# Patient Record
Sex: MALE | Race: WHITE | URBAN - METROPOLITAN AREA
[De-identification: names, ages, dates, MRNs, and addresses within clinical notes are randomized per-mention and may not be internally consistent; named-entity substitution may affect disease eponyms.]

---

## 2019-07-28 ENCOUNTER — EMERGENCY (EMERGENCY)
Facility: HOSPITAL | Age: 64
LOS: 0 days | Discharge: HOME | End: 2019-07-29
Attending: EMERGENCY MEDICINE | Admitting: EMERGENCY MEDICINE
Payer: SUBSIDIZED

## 2019-07-28 VITALS
DIASTOLIC BLOOD PRESSURE: 61 MMHG | RESPIRATION RATE: 18 BRPM | SYSTOLIC BLOOD PRESSURE: 95 MMHG | HEART RATE: 63 BPM | OXYGEN SATURATION: 98 %

## 2019-07-28 VITALS
RESPIRATION RATE: 18 BRPM | OXYGEN SATURATION: 98 % | HEART RATE: 78 BPM | DIASTOLIC BLOOD PRESSURE: 50 MMHG | TEMPERATURE: 98 F | SYSTOLIC BLOOD PRESSURE: 72 MMHG | WEIGHT: 134.92 LBS

## 2019-07-28 DIAGNOSIS — R56.9 UNSPECIFIED CONVULSIONS: ICD-10-CM

## 2019-07-28 DIAGNOSIS — Z79.899 OTHER LONG TERM (CURRENT) DRUG THERAPY: ICD-10-CM

## 2019-07-28 DIAGNOSIS — R41.0 DISORIENTATION, UNSPECIFIED: ICD-10-CM

## 2019-07-28 DIAGNOSIS — G40.909 EPILEPSY, UNSPECIFIED, NOT INTRACTABLE, WITHOUT STATUS EPILEPTICUS: ICD-10-CM

## 2019-07-28 LAB
ALBUMIN SERPL ELPH-MCNC: 3.7 G/DL — SIGNIFICANT CHANGE UP (ref 3.5–5.2)
ALP SERPL-CCNC: 57 U/L — SIGNIFICANT CHANGE UP (ref 30–115)
ALT FLD-CCNC: 12 U/L — SIGNIFICANT CHANGE UP (ref 0–41)
ANION GAP SERPL CALC-SCNC: 11 MMOL/L — SIGNIFICANT CHANGE UP (ref 7–14)
APPEARANCE UR: CLEAR — SIGNIFICANT CHANGE UP
APTT BLD: 31.5 SEC — SIGNIFICANT CHANGE UP (ref 27–39.2)
AST SERPL-CCNC: 15 U/L — SIGNIFICANT CHANGE UP (ref 0–41)
BASOPHILS # BLD AUTO: 0.04 K/UL — SIGNIFICANT CHANGE UP (ref 0–0.2)
BASOPHILS NFR BLD AUTO: 0.6 % — SIGNIFICANT CHANGE UP (ref 0–1)
BILIRUB SERPL-MCNC: <0.2 MG/DL — SIGNIFICANT CHANGE UP (ref 0.2–1.2)
BILIRUB UR-MCNC: NEGATIVE — SIGNIFICANT CHANGE UP
BUN SERPL-MCNC: 24 MG/DL — HIGH (ref 10–20)
CALCIUM SERPL-MCNC: 8.2 MG/DL — LOW (ref 8.5–10.1)
CHLORIDE SERPL-SCNC: 103 MMOL/L — SIGNIFICANT CHANGE UP (ref 98–110)
CO2 SERPL-SCNC: 27 MMOL/L — SIGNIFICANT CHANGE UP (ref 17–32)
COLOR SPEC: YELLOW — SIGNIFICANT CHANGE UP
CREAT SERPL-MCNC: 1 MG/DL — SIGNIFICANT CHANGE UP (ref 0.7–1.5)
DIFF PNL FLD: SIGNIFICANT CHANGE UP
EOSINOPHIL # BLD AUTO: 0.32 K/UL — SIGNIFICANT CHANGE UP (ref 0–0.7)
EOSINOPHIL NFR BLD AUTO: 5.2 % — SIGNIFICANT CHANGE UP (ref 0–8)
GAS PNL BLDV: SIGNIFICANT CHANGE UP
GLUCOSE SERPL-MCNC: 98 MG/DL — SIGNIFICANT CHANGE UP (ref 70–99)
GLUCOSE UR QL: NEGATIVE — SIGNIFICANT CHANGE UP
HCT VFR BLD CALC: 32.9 % — LOW (ref 42–52)
HGB BLD-MCNC: 11 G/DL — LOW (ref 14–18)
IMM GRANULOCYTES NFR BLD AUTO: 0.5 % — HIGH (ref 0.1–0.3)
INR BLD: 1.09 RATIO — SIGNIFICANT CHANGE UP (ref 0.65–1.3)
KETONES UR-MCNC: SIGNIFICANT CHANGE UP
LACTATE BLDV-MCNC: 2.4 MMOL/L — HIGH (ref 0.5–1.6)
LEUKOCYTE ESTERASE UR-ACNC: NEGATIVE — SIGNIFICANT CHANGE UP
LYMPHOCYTES # BLD AUTO: 2.72 K/UL — SIGNIFICANT CHANGE UP (ref 1.2–3.4)
LYMPHOCYTES # BLD AUTO: 44 % — SIGNIFICANT CHANGE UP (ref 20.5–51.1)
MCHC RBC-ENTMCNC: 30.7 PG — SIGNIFICANT CHANGE UP (ref 27–31)
MCHC RBC-ENTMCNC: 33.4 G/DL — SIGNIFICANT CHANGE UP (ref 32–37)
MCV RBC AUTO: 91.9 FL — SIGNIFICANT CHANGE UP (ref 80–94)
MONOCYTES # BLD AUTO: 0.46 K/UL — SIGNIFICANT CHANGE UP (ref 0.1–0.6)
MONOCYTES NFR BLD AUTO: 7.4 % — SIGNIFICANT CHANGE UP (ref 1.7–9.3)
NEUTROPHILS # BLD AUTO: 2.61 K/UL — SIGNIFICANT CHANGE UP (ref 1.4–6.5)
NEUTROPHILS NFR BLD AUTO: 42.3 % — SIGNIFICANT CHANGE UP (ref 42.2–75.2)
NITRITE UR-MCNC: NEGATIVE — SIGNIFICANT CHANGE UP
NRBC # BLD: 0 /100 WBCS — SIGNIFICANT CHANGE UP (ref 0–0)
PH UR: 6 — SIGNIFICANT CHANGE UP (ref 5–8)
PHENOBARB SERPL-MCNC: 11.7 UG/ML — LOW (ref 15–40)
PHENYTOIN FREE SERPL-MCNC: 6.5 UG/ML — LOW (ref 10–20)
PLATELET # BLD AUTO: 142 K/UL — SIGNIFICANT CHANGE UP (ref 130–400)
POTASSIUM SERPL-MCNC: 3.8 MMOL/L — SIGNIFICANT CHANGE UP (ref 3.5–5)
POTASSIUM SERPL-SCNC: 3.8 MMOL/L — SIGNIFICANT CHANGE UP (ref 3.5–5)
PROT SERPL-MCNC: 6 G/DL — SIGNIFICANT CHANGE UP (ref 6–8)
PROT UR-MCNC: SIGNIFICANT CHANGE UP
PROTHROM AB SERPL-ACNC: 12.5 SEC — SIGNIFICANT CHANGE UP (ref 9.95–12.87)
RBC # BLD: 3.58 M/UL — LOW (ref 4.7–6.1)
RBC # FLD: 13.8 % — SIGNIFICANT CHANGE UP (ref 11.5–14.5)
SODIUM SERPL-SCNC: 141 MMOL/L — SIGNIFICANT CHANGE UP (ref 135–146)
SP GR SPEC: 1.03 — HIGH (ref 1.01–1.02)
TROPONIN T SERPL-MCNC: <0.01 NG/ML — SIGNIFICANT CHANGE UP
UROBILINOGEN FLD QL: SIGNIFICANT CHANGE UP
VALPROATE SERPL-MCNC: 28 UG/ML — LOW (ref 50–100)
WBC # BLD: 6.18 K/UL — SIGNIFICANT CHANGE UP (ref 4.8–10.8)
WBC # FLD AUTO: 6.18 K/UL — SIGNIFICANT CHANGE UP (ref 4.8–10.8)

## 2019-07-28 PROCEDURE — 93010 ELECTROCARDIOGRAM REPORT: CPT

## 2019-07-28 PROCEDURE — 99285 EMERGENCY DEPT VISIT HI MDM: CPT

## 2019-07-28 PROCEDURE — 71045 X-RAY EXAM CHEST 1 VIEW: CPT | Mod: 26

## 2019-07-28 RX ORDER — SODIUM CHLORIDE 9 MG/ML
1000 INJECTION, SOLUTION INTRAVENOUS ONCE
Refills: 0 | Status: COMPLETED | OUTPATIENT
Start: 2019-07-28 | End: 2019-07-28

## 2019-07-28 RX ADMIN — SODIUM CHLORIDE 2000 MILLILITER(S): 9 INJECTION, SOLUTION INTRAVENOUS at 21:45

## 2019-07-28 NOTE — ED ADULT TRIAGE NOTE - CHIEF COMPLAINT QUOTE
pt BIBA s/p witnessed seizure by family for 20 mins. pt has hx of seizures. pt is aaox4, NAD noted. GCS 15, pt does not seem post ictal.     in triage pt is hypotensive 72/50, bp repeated twice, pt still hypotensive 74/52. pt sent to Critical care/ trauma area.

## 2019-07-28 NOTE — ED PROVIDER NOTE - PROGRESS NOTE DETAILS
Long discussion with patient and nephew at bedside, plan for d/c home and f/u with neurologist. PT informed that medication levels are low, pt given Rx to increase Depakote to 750 mg TID. Patient to be discharged from ED. Any available test results were discussed with patient and/or family. Verbal instructions given, including instructions to return to ED immediately for any new, worsening, or concerning symptoms. Patient endorsed understanding. Written discharge instructions additionally given, including follow-up plan.

## 2019-07-28 NOTE — ED PROVIDER NOTE - CARE PROVIDER_API CALL
Marvin Casas)  Neurology  [address] [place] [address], [address] [place] [address]  Phone: [phone]  Fax: [phone]  Follow Up Time:

## 2019-07-28 NOTE — ED PROVIDER NOTE - OBJECTIVE STATEMENT
63 y/o male with PMH of seizure disorder who presents to ED for seizure. Pt was in home with sister who had COPD exacerbation and was brought to ED via BLS, when pt witnessed this the stressful situation caused the pt to have  witness tonic clonic seizure lasting about 5 minutes. After pt was confused, no tongue bite or incontinence. While in ED pt c/o feeling tired. No fever, chills, cough, congestion, abdominal pain, nausea vomiting or diarrhea. Pt is followed by n euro in [place] and has seizures about once every 2-3 months. 63 y/o male with PMH of seizure disorder who presents to ED for seizure. Pt was in home with sister who had COPD exacerbation and was brought to ED via BLS, when pt witnessed this the stressful situation caused the pt to have  witness tonic clonic seizure lasting about 5 minutes. After pt was confused, no tongue bite or incontinence. While in ED pt c/o feeling tired. No fever, chills, cough, congestion, abdominal pain, nausea vomiting or diarrhea. Pt is followed by neuro in [place] and has seizures about once every 2-3 months.

## 2019-07-28 NOTE — ED PROVIDER NOTE - CLINICAL SUMMARY MEDICAL DECISION MAKING FREE TEXT BOX
Patient reports compliance with medications.  He is trying to get a Neurologist on [place]. On exam, VS reviewed.  Patient back to baseline.  Neuro exam is normal. No signs of serious trauma.  IV placed, labs sent.  Patient has subtherapeutic levels of his anti-epileptic drugs.  All results discussed with patient and his nephew.  Patient opting for discharge with close outpatient follow up after shared decision making utilized. Plan made to increase Depakote dosage for now and patient given referral to Neurology on [place].    Full DC instructions discussed and patient knows when to seek immediate medical attention.  Patient has proper follow up.  All results discussed and patient aware they may require further work up.  Proper follow up ensured. Limitations of ED work up discussed.  Medications administered and prescribed/OTC home meds discussed.  All questions and concerns from patient or family addressed. Understanding of instructions verbalized.

## 2019-07-28 NOTE — ED PROVIDER NOTE - NS ED ROS FT
Review of Systems:  •	CONSTITUTIONAL - No fever, No diaphoresis, No weight change  •	SKIN - No rash  •	HEMATOLOGIC - No abnormal bleeding or bruising  •	EYES - No eye pain, No blurred vision  •	ENT - No change in hearing, No sore throat, No neck pain, No rhinorrhea, No ear pain  •	RESPIRATORY - No shortness of breath, No cough  •	CARDIAC -No chest pain, No palpitations  •	GI - No abdominal pain, No nausea, No vomiting, No diarrhea, No constipation, No bright red blood per rectum or melena. No flank pain  •                 - No dysuria, frequency, hematuria.   •	ENDO - No polydypsia, No polyuria, No heat/cold intolerance  •	MUSCULOSKELETAL - No joint paint, No swelling, No back pain  •	NEUROLOGIC - + seizure. No numbness, No focal weakness, No headache, No dizziness  All other systems negative, unless specified in HPI

## 2019-07-28 NOTE — ED PROVIDER NOTE - ATTENDING CONTRIBUTION TO CARE
I personally evaluated patient. I agree with the findings and plan with all documentation on chart except as documented  in my note.    65 y/o male with PMH of seizure disorder who presents to ED for seizure. Pt was in home with sister who had COPD exacerbation and was brought to ED via BLS, when pt witnessed this the stressful situation caused the pt to have  witness tonic clonic seizure lasting about 5 minutes. After pt was confused, no tongue bite or incontinence. While in ED pt c/o feeling tired. No fever, chills, cough, congestion, abdominal pain, nausea vomiting or diarrhea. Pt is followed by neuro in [place] and has seizures about once every 2-3 months.    Patient reports compliance with medications.  He is trying to get a Neurologist on [place]. On exam, VS reviewed.  Patient back to baseline.  Neuro exam is normal. No signs of serious trauma.  IV placed, labs sent.  Patient has subtherapeutic levels of his anti-epileptic drugs.  All results discussed with patient and his nephew.  Patient opting for discharge with close outpatient follow up after shared decision making utilized. Plan made to increase Depakote dosage for now and patient given referral to Neurology on [place].    Full DC instructions discussed and patient knows when to seek immediate medical attention.  Patient has proper follow up.  All results discussed and patient aware they may require further work up.  Proper follow up ensured. Limitations of ED work up discussed.  Medications administered and prescribed/OTC home meds discussed.  All questions and concerns from patient or family addressed. Understanding of instructions verbalized.

## 2019-07-29 LAB
BASE EXCESS BLDV CALC-SCNC: 3.3 MMOL/L — HIGH (ref -2–2)
CA-I SERPL-SCNC: 1.1 MMOL/L — LOW (ref 1.12–1.3)
GAS PNL BLDV: 139 MMOL/L — SIGNIFICANT CHANGE UP (ref 136–145)
HCO3 BLDV-SCNC: 30 MMOL/L — HIGH (ref 22–29)
HCT VFR BLDA CALC: 32.2 % — LOW (ref 34–44)
HGB BLD CALC-MCNC: 10.5 G/DL — LOW (ref 14–18)
LACTATE BLDV-MCNC: 2.8 MMOL/L — HIGH (ref 0.5–1.6)
PCO2 BLDV: 55 MMHG — HIGH (ref 41–51)
PH BLDV: 7.34 — SIGNIFICANT CHANGE UP (ref 7.26–7.43)
PO2 BLDV: 20 MMHG — SIGNIFICANT CHANGE UP (ref 20–40)
POTASSIUM BLDV-SCNC: 3.5 MMOL/L — SIGNIFICANT CHANGE UP (ref 3.3–5.6)
SAO2 % BLDV: 35 % — SIGNIFICANT CHANGE UP

## 2019-07-29 RX ORDER — DIVALPROEX SODIUM 500 MG/1
1 TABLET, DELAYED RELEASE ORAL
Qty: 30 | Refills: 0
Start: 2019-07-29 | End: 2019-08-27

## 2019-07-29 NOTE — ED ADULT NURSE NOTE - NSIMPLEMENTINTERV_GEN_ALL_ED
Implemented All Fall with Harm Risk Interventions:  [place] to call system. Call bell, personal items and telephone within reach. Instruct patient to call for assistance. Room bathroom lighting operational. Non-slip footwear when patient is off stretcher. Physically safe environment: no spills, clutter or unnecessary equipment. Stretcher in lowest position, wheels locked, appropriate side rails in place. Provide visual cue, wrist band, yellow gown, etc. Monitor gait and stability. Monitor for mental status changes and reorient to person, place, and time. Review medications for side effects contributing to fall risk. Reinforce activity limits and safety measures with patient and family. Provide visual clues: red socks.

## 2019-07-29 NOTE — ED ADULT NURSE NOTE - CHPI ED NUR SYMPTOMS NEG
no loss of consciousness/no numbness/no change in level of consciousness/no nausea/no fever/no weakness/no dizziness/no blurred vision/no vomiting/no confusion

## 2019-07-30 LAB
CULTURE RESULTS: SIGNIFICANT CHANGE UP
SPECIMEN SOURCE: SIGNIFICANT CHANGE UP

## 2019-07-30 RX ORDER — DIVALPROEX SODIUM 500 MG/1
1 TABLET, DELAYED RELEASE ORAL
Qty: 30 | Refills: 0
Start: 2019-07-30 | End: 2019-08-28

## 2019-08-21 ENCOUNTER — EMERGENCY (EMERGENCY)
Facility: HOSPITAL | Age: 64
LOS: 0 days | Discharge: HOME | End: 2019-08-21
Attending: EMERGENCY MEDICINE | Admitting: EMERGENCY MEDICINE
Payer: SUBSIDIZED

## 2019-08-21 VITALS
RESPIRATION RATE: 16 BRPM | TEMPERATURE: 98 F | OXYGEN SATURATION: 98 % | DIASTOLIC BLOOD PRESSURE: 63 MMHG | SYSTOLIC BLOOD PRESSURE: 104 MMHG | HEART RATE: 59 BPM

## 2019-08-21 VITALS
SYSTOLIC BLOOD PRESSURE: 90 MMHG | DIASTOLIC BLOOD PRESSURE: 53 MMHG | RESPIRATION RATE: 16 BRPM | OXYGEN SATURATION: 98 % | HEART RATE: 68 BPM | TEMPERATURE: 96 F

## 2019-08-21 DIAGNOSIS — G40.909 EPILEPSY, UNSPECIFIED, NOT INTRACTABLE, WITHOUT STATUS EPILEPTICUS: ICD-10-CM

## 2019-08-21 DIAGNOSIS — R56.9 UNSPECIFIED CONVULSIONS: ICD-10-CM

## 2019-08-21 LAB
ALBUMIN SERPL ELPH-MCNC: 3.7 G/DL — SIGNIFICANT CHANGE UP (ref 3.5–5.2)
ALP SERPL-CCNC: 49 U/L — SIGNIFICANT CHANGE UP (ref 30–115)
ALT FLD-CCNC: 22 U/L — SIGNIFICANT CHANGE UP (ref 0–41)
ANION GAP SERPL CALC-SCNC: 10 MMOL/L — SIGNIFICANT CHANGE UP (ref 7–14)
APPEARANCE UR: CLEAR — SIGNIFICANT CHANGE UP
AST SERPL-CCNC: 19 U/L — SIGNIFICANT CHANGE UP (ref 0–41)
BASOPHILS # BLD AUTO: 0.06 K/UL — SIGNIFICANT CHANGE UP (ref 0–0.2)
BASOPHILS NFR BLD AUTO: 0.9 % — SIGNIFICANT CHANGE UP (ref 0–1)
BILIRUB SERPL-MCNC: 0.2 MG/DL — SIGNIFICANT CHANGE UP (ref 0.2–1.2)
BILIRUB UR-MCNC: NEGATIVE — SIGNIFICANT CHANGE UP
BUN SERPL-MCNC: 26 MG/DL — HIGH (ref 10–20)
CALCIUM SERPL-MCNC: 9 MG/DL — SIGNIFICANT CHANGE UP (ref 8.5–10.1)
CHLORIDE SERPL-SCNC: 103 MMOL/L — SIGNIFICANT CHANGE UP (ref 98–110)
CO2 SERPL-SCNC: 26 MMOL/L — SIGNIFICANT CHANGE UP (ref 17–32)
COLOR SPEC: SIGNIFICANT CHANGE UP
CREAT SERPL-MCNC: 0.9 MG/DL — SIGNIFICANT CHANGE UP (ref 0.7–1.5)
DIFF PNL FLD: SIGNIFICANT CHANGE UP
EOSINOPHIL # BLD AUTO: 0.34 K/UL — SIGNIFICANT CHANGE UP (ref 0–0.7)
EOSINOPHIL NFR BLD AUTO: 5.3 % — SIGNIFICANT CHANGE UP (ref 0–8)
GLUCOSE SERPL-MCNC: 86 MG/DL — SIGNIFICANT CHANGE UP (ref 70–99)
GLUCOSE UR QL: NEGATIVE — SIGNIFICANT CHANGE UP
HCT VFR BLD CALC: 38.9 % — LOW (ref 42–52)
HGB BLD-MCNC: 12.8 G/DL — LOW (ref 14–18)
IMM GRANULOCYTES NFR BLD AUTO: 0.9 % — HIGH (ref 0.1–0.3)
KETONES UR-MCNC: NEGATIVE — SIGNIFICANT CHANGE UP
LEUKOCYTE ESTERASE UR-ACNC: NEGATIVE — SIGNIFICANT CHANGE UP
LIDOCAIN IGE QN: 16 U/L — SIGNIFICANT CHANGE UP (ref 7–60)
LYMPHOCYTES # BLD AUTO: 2.15 K/UL — SIGNIFICANT CHANGE UP (ref 1.2–3.4)
LYMPHOCYTES # BLD AUTO: 33.8 % — SIGNIFICANT CHANGE UP (ref 20.5–51.1)
MCHC RBC-ENTMCNC: 30.3 PG — SIGNIFICANT CHANGE UP (ref 27–31)
MCHC RBC-ENTMCNC: 32.9 G/DL — SIGNIFICANT CHANGE UP (ref 32–37)
MCV RBC AUTO: 92 FL — SIGNIFICANT CHANGE UP (ref 80–94)
MONOCYTES # BLD AUTO: 0.58 K/UL — SIGNIFICANT CHANGE UP (ref 0.1–0.6)
MONOCYTES NFR BLD AUTO: 9.1 % — SIGNIFICANT CHANGE UP (ref 1.7–9.3)
NEUTROPHILS # BLD AUTO: 3.18 K/UL — SIGNIFICANT CHANGE UP (ref 1.4–6.5)
NEUTROPHILS NFR BLD AUTO: 50 % — SIGNIFICANT CHANGE UP (ref 42.2–75.2)
NITRITE UR-MCNC: NEGATIVE — SIGNIFICANT CHANGE UP
NRBC # BLD: 0 /100 WBCS — SIGNIFICANT CHANGE UP (ref 0–0)
PH UR: 6 — SIGNIFICANT CHANGE UP (ref 5–8)
PHENOBARB SERPL-MCNC: 25.9 UG/ML — SIGNIFICANT CHANGE UP (ref 15–40)
PHENYTOIN FREE SERPL-MCNC: 9.7 UG/ML — LOW (ref 10–20)
PLATELET # BLD AUTO: 163 K/UL — SIGNIFICANT CHANGE UP (ref 130–400)
POTASSIUM SERPL-MCNC: 4.5 MMOL/L — SIGNIFICANT CHANGE UP (ref 3.5–5)
POTASSIUM SERPL-SCNC: 4.5 MMOL/L — SIGNIFICANT CHANGE UP (ref 3.5–5)
PROT SERPL-MCNC: 6.3 G/DL — SIGNIFICANT CHANGE UP (ref 6–8)
PROT UR-MCNC: NEGATIVE — SIGNIFICANT CHANGE UP
RBC # BLD: 4.23 M/UL — LOW (ref 4.7–6.1)
RBC # FLD: 14.9 % — HIGH (ref 11.5–14.5)
SODIUM SERPL-SCNC: 139 MMOL/L — SIGNIFICANT CHANGE UP (ref 135–146)
SP GR SPEC: 1.02 — SIGNIFICANT CHANGE UP (ref 1.01–1.02)
TROPONIN T SERPL-MCNC: <0.01 NG/ML — SIGNIFICANT CHANGE UP
UROBILINOGEN FLD QL: SIGNIFICANT CHANGE UP
VALPROATE SERPL-MCNC: 19 UG/ML — LOW (ref 50–100)
WBC # BLD: 6.37 K/UL — SIGNIFICANT CHANGE UP (ref 4.8–10.8)
WBC # FLD AUTO: 6.37 K/UL — SIGNIFICANT CHANGE UP (ref 4.8–10.8)

## 2019-08-21 PROCEDURE — 99285 EMERGENCY DEPT VISIT HI MDM: CPT

## 2019-08-21 PROCEDURE — 71045 X-RAY EXAM CHEST 1 VIEW: CPT | Mod: 26

## 2019-08-21 PROCEDURE — 93010 ELECTROCARDIOGRAM REPORT: CPT

## 2019-08-21 PROCEDURE — 70450 CT HEAD/BRAIN W/O DYE: CPT | Mod: 26

## 2019-08-21 RX ORDER — PHENOBARBITAL 60 MG
1 TABLET ORAL
Qty: 40 | Refills: 0
Start: 2019-08-21 | End: 2019-08-30

## 2019-08-21 RX ORDER — DIVALPROEX SODIUM 500 MG/1
250 TABLET, DELAYED RELEASE ORAL ONCE
Refills: 0 | Status: COMPLETED | OUTPATIENT
Start: 2019-08-21 | End: 2019-08-21

## 2019-08-21 RX ORDER — VALPROIC ACID (AS SODIUM SALT) 250 MG/5ML
250 SOLUTION, ORAL ORAL ONCE
Refills: 0 | Status: DISCONTINUED | OUTPATIENT
Start: 2019-08-21 | End: 2019-08-21

## 2019-08-21 RX ORDER — SODIUM CHLORIDE 9 MG/ML
1000 INJECTION INTRAMUSCULAR; INTRAVENOUS; SUBCUTANEOUS ONCE
Refills: 0 | Status: COMPLETED | OUTPATIENT
Start: 2019-08-21 | End: 2019-08-21

## 2019-08-21 RX ADMIN — DIVALPROEX SODIUM 250 MILLIGRAM(S): 500 TABLET, DELAYED RELEASE ORAL at 16:08

## 2019-08-21 RX ADMIN — Medication 200 MILLIGRAM(S): at 14:49

## 2019-08-21 RX ADMIN — SODIUM CHLORIDE 2000 MILLILITER(S): 9 INJECTION INTRAMUSCULAR; INTRAVENOUS; SUBCUTANEOUS at 13:01

## 2019-08-21 NOTE — ED PROVIDER NOTE - PHYSICAL EXAMINATION
Gen: NAD, AOx3  Head: NCAT  HEENT: PERRL, oral mucosa moist, normal conjunctiva, oropharynx clear without exudate or erythema, no abrasion/bites to tongue   Lung: CTAB, no respiratory distress, no wheezing, rales, rhonchi  CV: normal s1/s2, rrr, Normal perfusion, pulses 2+ throughout  Abd: soft, NTND, no CVA tenderness  Genitourinary: no pelvic tenderness  MSK: No edema, no visible deformities, full range of motion in all 4 extremities  Neuro: CN II-XII grossly intact, No focal neurologic deficits, no nystagmus/pronator drift, strength 5/5 throughout, coordination and sensation intact  Skin: No rash   Psych: normal affect

## 2019-08-21 NOTE — ED PROVIDER NOTE - NS ED ROS FT
Constitutional: (-) fever  Eyes/ENT: (-) blurry vision, (-) epistaxis, (-) visual changes   Cardiovascular: (-) chest pain, (-) syncope  Respiratory: (-) cough, (-) shortness of breath  Gastrointestinal: (-) vomiting, (-) diarrhea  Genitourinary: (-) dysuria, (-) hesitancy, (-) frequency   Musculoskeletal: (-) neck pain, (-) back pain, (-) joint pain  Integumentary: (-) rash, (-) edema  Neurological: (-) headache, (-) altered mental status, seizure   Allergic/Immunologic: (-) pruritus

## 2019-08-21 NOTE — ED ADULT NURSE REASSESSMENT NOTE - NS ED NURSE REASSESS COMMENT FT1
Pt BIBA, pt had witnessed seizure x 45 minutes by family. Pt then had mild seizure in triage. Upon receiving pt report, pt disoriented. As per family pt did not take his medications today.

## 2019-08-21 NOTE — ED PROVIDER NOTE - CLINICAL SUMMARY MEDICAL DECISION MAKING FREE TEXT BOX
Patient presented s/p seizure, known hx seizures in the past. Otherwise afebrile, HD stable, at baseline mental status, fully neurovascular intact. Obtained labs, CT head, CXR, all grossly unremarkable except for slightly low valproic acid level. Patient was given doses of anti-seizure meds in ED. Patient monitored in ED without recurrence of symptoms. Tolerates PO. Will discharge home with outpatient neuro follow up. Patient agreeable with plan. Agrees to return to ED for any new or worsening symptoms.

## 2019-08-21 NOTE — ED PROVIDER NOTE - ATTENDING CONTRIBUTION TO CARE
raj historian hisotry of seizures on depakote phenytoin and phenobarb who presents after  siezure per patient, reports sister was at home she called ems, patient states he was on his couch and had seizure and then remembers waking up with ems. ems note reports shaking activity and was given versed. here patient is awake and oriented but can not remember full details to questions about today. he moves oxana xt he has no tongue biting, no signs of trauma, neck is not stiff, no rash, no head injury, imp: seizure vs pseudo seizure, check ct head labs.

## 2019-08-21 NOTE — ED PROVIDER NOTE - NSFOLLOWUPINSTRUCTIONS_ED_ALL_ED_FT
Please follow up with your primary care physician within 24-72 hours and return immediately if symptoms worsen.     Seizure, Adult  When you have a seizure:  Parts of your body may move.  You may have a change in how aware or awake (conscious) you are.  You may shake (convulse).  Seizures usually last from 30 seconds to 2 minutes. Usually, they are not harmful unless they last a long time.    What are the signs or symptoms?  Common symptoms of this condition include:  Shaking (convulsions).  Stiffness in the body.  Passing out (losing consciousness).  Uncontrolled movements in the:  Arms or legs.  Eyes.  Head.  Mouth.  Some people have symptoms right before a seizure happens. These symptoms may include:  Fear.  Worry (anxiety).  Feeling like you are going to throw up (nausea).  Feeling like the room is spinning (vertigo).  Feeling like you saw or heard something before (déjà vu).  Odd tastes or smells.  Changes in vision, such as seeing flashing lights or spots.  Follow these instructions at home:  Medicines     Image   Take over-the-counter and prescription medicines only as told by your doctor.  Do not eat or drink anything that may keep your medicine from working, such as alcohol.  Activity     Do not do any activities that would be dangerous if you had another seizure, like driving or swimming. Wait until your doctor says it is safe for you to do them.  If you live in the [place] ask your local DMV (department of Appetise) when you can drive.  Get plenty of rest.  Teaching others     Image   Teach friends and family what to do when you have a seizure. They should:  Lay you on the ground.  Protect your head and body.  Loosen any tight clothing around your neck.  Turn you on your side.  Not hold you down.  Not put anything into your mouth.  Know whether or not you need emergency care.  Stay with you until you are better.  General instructions     Contact your doctor each time you have a seizure.  Avoid anything that gives you seizures.  Keep a seizure diary. Write down:  What you think caused each seizure.  What you remember about each seizure.  Keep all follow-up visits as told by your doctor. This is important.  Contact a doctor if:  You have another seizure.  You have seizures more often.  There is any change in what happens during your seizures.  You keep having seizures with treatment.  You have symptoms of being sick or having an infection.  Get help right away if:  You have a seizure:  That lasts longer than 5 minutes.  That is different than seizures you had before.  That makes it harder to breathe.  After you hurt your head.  After a seizure, you cannot speak or use a part of your body.  After a seizure, you are confused or have a bad headache.  You have two or more seizures in a row.  You are having seizures more often.  You do not wake up right after a seizure.  You get hurt during a seizure.  In an emergency:     These symptoms may be an emergency. Do not wait to see if the symptoms will go away. Get medical help right away. Call your local emergency services (911 in the [place] Do not drive yourself to the hospital.   Summary  Seizures usually last from 30 seconds to 2 minutes. Usually, they are not harmful unless they last a long time.  Do not eat or drink anything that may keep your medicine from working, such as alcohol.  Teach friends and family what to do when you have a seizure.  Contact your doctor each time you have a seizure.  This information is not intended to replace advice given to you by your health care provider. Make sure you discuss any questions you have with your health care provider

## 2019-08-21 NOTE — ED ADULT TRIAGE NOTE - CHIEF COMPLAINT QUOTE
As per EMS, pt was "shaking" upon arrival and was given 2 doses of Versed 5mg IV. Pt with a history of seizures on [place]."

## 2019-08-21 NOTE — ED ADULT NURSE NOTE - OBJECTIVE STATEMENT
pt BIBA , pt had seizure at home x 45 minutes. Upon receiving report, pt disoriented, and no seizure activity.

## 2019-08-21 NOTE — ED PROVIDER NOTE - OBJECTIVE STATEMENT
65 yo male with male with a pmh of seizures presents after a seizure. pt states he was sitting on his couch when this occurred and he did experience urinary incontinence. this seizure was unwitnessed so it is unknown of the length but he did wake up on the couch and the was no head trauma. pt denies any recent illness/infection and is states to be compliant with medication he denies any other symptoms including fevers, chill, headache, recent illness/travel, cough, abdominal pain, chest pain, or SOB

## 2019-08-21 NOTE — ED ADULT NURSE REASSESSMENT NOTE - NS ED NURSE REASSESS COMMENT FT1
Pt refusing bed alarm and red socks at this time despite education on safety and importance of bed alarm. Pt given fall risk bracelet and call bell, instructed to use call bell prior to getting up from bed, pt endorses he can walk on his own, will continue to monitor. Pt refusing bed alarm and red socks at this time despite education on safety and importance of bed alarm. Pt given fall risk bracelet and call bell, instructed to use call bell prior to getting up from bed, pt endorses he can walk on his own, will continue to monitor. Pt A/Ox3 at this time, pt ambulating with steady gait at this time.

## 2019-08-21 NOTE — ED ADULT NURSE NOTE - NSIMPLEMENTINTERV_GEN_ALL_ED
Implemented All Fall with Harm Risk Interventions:  [place] to call system. Call bell, personal items and telephone within reach. Instruct patient to call for assistance. Room bathroom lighting operational. Non-slip footwear when patient is off stretcher. Physically safe environment: no spills, clutter or unnecessary equipment. Stretcher in lowest position, wheels locked, appropriate side rails in place. Provide visual cue, wrist band, yellow gown, etc. Monitor gait and stability. Monitor for mental status changes and reorient to person, place, and time. Review medications for side effects contributing to fall risk. Reinforce activity limits and safety measures with patient and family. Provide visual clues: red socks.

## 2019-08-21 NOTE — ED PROVIDER NOTE - NSFOLLOWUPCLINICS_GEN_ALL_ED_FT
[facility]  Neurology  [address] [place] [address], [facility] [address]  [place] [address]  Phone: [phone]  Fax:   Follow Up Time: 1-3 Days

## 2019-08-22 PROBLEM — Z00.00 ENCOUNTER FOR PREVENTIVE HEALTH EXAMINATION: Status: ACTIVE | Noted: 2019-08-22

## 2019-08-22 LAB — GLUCOSE BLDC GLUCOMTR-MCNC: 81 MG/DL — SIGNIFICANT CHANGE UP (ref 70–99)

## 2019-08-23 ENCOUNTER — EMERGENCY (EMERGENCY)
Facility: HOSPITAL | Age: 64
LOS: 0 days | Discharge: LEFT BEFORE DISCHARGE | End: 2019-08-23
Attending: EMERGENCY MEDICINE | Admitting: EMERGENCY MEDICINE
Payer: MEDICARE

## 2019-08-23 VITALS
TEMPERATURE: 96 F | HEART RATE: 72 BPM | OXYGEN SATURATION: 100 % | WEIGHT: 164.91 LBS | SYSTOLIC BLOOD PRESSURE: 88 MMHG | RESPIRATION RATE: 16 BRPM | DIASTOLIC BLOOD PRESSURE: 50 MMHG

## 2019-08-23 DIAGNOSIS — G40.909 EPILEPSY, UNSPECIFIED, NOT INTRACTABLE, WITHOUT STATUS EPILEPTICUS: ICD-10-CM

## 2019-08-23 DIAGNOSIS — R56.9 UNSPECIFIED CONVULSIONS: ICD-10-CM

## 2019-08-23 LAB
ALBUMIN SERPL ELPH-MCNC: 4.2 G/DL — SIGNIFICANT CHANGE UP (ref 3.5–5.2)
ALP SERPL-CCNC: 55 U/L — SIGNIFICANT CHANGE UP (ref 30–115)
ALT FLD-CCNC: 22 U/L — SIGNIFICANT CHANGE UP (ref 0–41)
ANION GAP SERPL CALC-SCNC: 9 MMOL/L — SIGNIFICANT CHANGE UP (ref 7–14)
AST SERPL-CCNC: 21 U/L — SIGNIFICANT CHANGE UP (ref 0–41)
BASOPHILS # BLD AUTO: 0.04 K/UL — SIGNIFICANT CHANGE UP (ref 0–0.2)
BASOPHILS NFR BLD AUTO: 0.8 % — SIGNIFICANT CHANGE UP (ref 0–1)
BILIRUB SERPL-MCNC: <0.2 MG/DL — SIGNIFICANT CHANGE UP (ref 0.2–1.2)
BUN SERPL-MCNC: 23 MG/DL — HIGH (ref 10–20)
CALCIUM SERPL-MCNC: 8.6 MG/DL — SIGNIFICANT CHANGE UP (ref 8.5–10.1)
CHLORIDE SERPL-SCNC: 104 MMOL/L — SIGNIFICANT CHANGE UP (ref 98–110)
CO2 SERPL-SCNC: 28 MMOL/L — SIGNIFICANT CHANGE UP (ref 17–32)
CREAT SERPL-MCNC: 1 MG/DL — SIGNIFICANT CHANGE UP (ref 0.7–1.5)
EOSINOPHIL # BLD AUTO: 0.26 K/UL — SIGNIFICANT CHANGE UP (ref 0–0.7)
EOSINOPHIL NFR BLD AUTO: 5 % — SIGNIFICANT CHANGE UP (ref 0–8)
GLUCOSE SERPL-MCNC: 89 MG/DL — SIGNIFICANT CHANGE UP (ref 70–99)
HCT VFR BLD CALC: 37.2 % — LOW (ref 42–52)
HGB BLD-MCNC: 12.1 G/DL — LOW (ref 14–18)
IMM GRANULOCYTES NFR BLD AUTO: 0.6 % — HIGH (ref 0.1–0.3)
LACTATE SERPL-SCNC: 1.2 MMOL/L — SIGNIFICANT CHANGE UP (ref 0.5–2.2)
LEVETIRACETAM SERPL-MCNC: <2 MCG/ML — LOW (ref 12–46)
LYMPHOCYTES # BLD AUTO: 2.37 K/UL — SIGNIFICANT CHANGE UP (ref 1.2–3.4)
LYMPHOCYTES # BLD AUTO: 45.1 % — SIGNIFICANT CHANGE UP (ref 20.5–51.1)
MCHC RBC-ENTMCNC: 30.5 PG — SIGNIFICANT CHANGE UP (ref 27–31)
MCHC RBC-ENTMCNC: 32.5 G/DL — SIGNIFICANT CHANGE UP (ref 32–37)
MCV RBC AUTO: 93.7 FL — SIGNIFICANT CHANGE UP (ref 80–94)
MONOCYTES # BLD AUTO: 0.41 K/UL — SIGNIFICANT CHANGE UP (ref 0.1–0.6)
MONOCYTES NFR BLD AUTO: 7.8 % — SIGNIFICANT CHANGE UP (ref 1.7–9.3)
NEUTROPHILS # BLD AUTO: 2.14 K/UL — SIGNIFICANT CHANGE UP (ref 1.4–6.5)
NEUTROPHILS NFR BLD AUTO: 40.7 % — LOW (ref 42.2–75.2)
NRBC # BLD: 0 /100 WBCS — SIGNIFICANT CHANGE UP (ref 0–0)
PLATELET # BLD AUTO: 147 K/UL — SIGNIFICANT CHANGE UP (ref 130–400)
POTASSIUM SERPL-MCNC: 4.9 MMOL/L — SIGNIFICANT CHANGE UP (ref 3.5–5)
POTASSIUM SERPL-SCNC: 4.9 MMOL/L — SIGNIFICANT CHANGE UP (ref 3.5–5)
PROT SERPL-MCNC: 6.6 G/DL — SIGNIFICANT CHANGE UP (ref 6–8)
RBC # BLD: 3.97 M/UL — LOW (ref 4.7–6.1)
RBC # FLD: 15 % — HIGH (ref 11.5–14.5)
SODIUM SERPL-SCNC: 141 MMOL/L — SIGNIFICANT CHANGE UP (ref 135–146)
WBC # BLD: 5.25 K/UL — SIGNIFICANT CHANGE UP (ref 4.8–10.8)
WBC # FLD AUTO: 5.25 K/UL — SIGNIFICANT CHANGE UP (ref 4.8–10.8)

## 2019-08-23 PROCEDURE — 99284 EMERGENCY DEPT VISIT MOD MDM: CPT

## 2019-08-23 PROCEDURE — 93010 ELECTROCARDIOGRAM REPORT: CPT

## 2019-08-23 RX ORDER — DIVALPROEX SODIUM 500 MG/1
250 TABLET, DELAYED RELEASE ORAL ONCE
Refills: 0 | Status: COMPLETED | OUTPATIENT
Start: 2019-08-23 | End: 2019-08-23

## 2019-08-23 RX ORDER — LEVETIRACETAM 250 MG/1
1000 TABLET, FILM COATED ORAL EVERY 12 HOURS
Refills: 0 | Status: DISCONTINUED | OUTPATIENT
Start: 2019-08-23 | End: 2019-08-23

## 2019-08-23 RX ORDER — SODIUM CHLORIDE 9 MG/ML
1000 INJECTION INTRAMUSCULAR; INTRAVENOUS; SUBCUTANEOUS ONCE
Refills: 0 | Status: COMPLETED | OUTPATIENT
Start: 2019-08-23 | End: 2019-08-23

## 2019-08-23 RX ORDER — FOSPHENYTOIN 50 MG/ML
500 INJECTION INTRAMUSCULAR; INTRAVENOUS ONCE
Refills: 0 | Status: DISCONTINUED | OUTPATIENT
Start: 2019-08-23 | End: 2019-08-23

## 2019-08-23 RX ADMIN — DIVALPROEX SODIUM 250 MILLIGRAM(S): 500 TABLET, DELAYED RELEASE ORAL at 21:03

## 2019-08-23 RX ADMIN — SODIUM CHLORIDE 2000 MILLILITER(S): 9 INJECTION INTRAMUSCULAR; INTRAVENOUS; SUBCUTANEOUS at 21:03

## 2019-08-23 NOTE — ED ADULT NURSE NOTE - NSIMPLEMENTINTERV_GEN_ALL_ED
Implemented All Fall with Harm Risk Interventions:  [place] to call system. Call bell, personal items and telephone within reach. Instruct patient to call for assistance. Room bathroom lighting operational. Non-slip footwear when patient is off stretcher. Physically safe environment: no spills, clutter or unnecessary equipment. Stretcher in lowest position, wheels locked, appropriate side rails in place. Provide visual cue, wrist band, yellow gown, etc. Monitor gait and stability. Monitor for mental status changes and reorient to person, place, and time. Review medications for side effects contributing to fall risk. Reinforce activity limits and safety measures with patient and family. Provide visual clues: red socks.

## 2019-08-23 NOTE — ED PROVIDER NOTE - CLINICAL SUMMARY MEDICAL DECISION MAKING FREE TEXT BOX
pt refusing admission as advised by neuro, will d/c   I had extensive discussion of Risks/Alternatives/Benefits of pursuing further medical evaluation and/or care with patient and any available family/friends; patient still electing to leave against medical advice. Patient is awake, alert, oriented and demonstrates full capacity and insight into illness. Patient aware and encouraged to return immediately to ED or nearest ED if patient decides to change mind regarding care or if patient experiences any new, worsening, or concerning symptoms.

## 2019-08-23 NOTE — ED PROVIDER NOTE - OBJECTIVE STATEMENT
The patient is a 64 year old male with a history of epilepsy who presents for seizures. Patient was having an argument with his sister and nephew this evening and began seizing. Seizure was witnessed by mother and nephew. Patient's whole body was shaking. No eye rolling. No foaming at the mouth. No urinary incontinence. Patient did not hit is head. Seizure lasted approx 15 minutes. Upon arrival of EMS, patient was given 10 versed which improved his symptoms. Patient was post ictal upon arrival to ED.   Patient states he does not follow with neurology for seizures. He does not know the names of his medications. Last seizure was 2 days ago.

## 2019-08-23 NOTE — ED PROVIDER NOTE - NSFOLLOWUPINSTRUCTIONS_ED_ALL_ED_FT
Please follow with your neurologist in 1-3 days.     Seizure, Adult  A seizure is a sudden burst of abnormal electrical activity in the brain. The abnormal activity temporarily interrupts normal brain function, causing a person to experience any of the following:    Involuntary movements.  Changes in awareness or consciousness.  Uncontrollable shaking (convulsions).    Seizures usually last from 30 seconds to 2 minutes. They usually do not cause permanent brain damage unless they are prolonged.    What can cause a seizure to happen?     Seizures can happen for many reasons including:    A fever.  Low blood sugar.  A medicine.  An illnesses.  A brain injury.    Some people who have a seizure never have another one. People who have repeated seizures have a condition called epilepsy.    What are the symptoms of a seizure?     Symptoms of a seizure vary greatly from person to person. They include:    Convulsions.  Stiffening of the body.  Involuntary movements of the arms or legs.  Loss of consciousness.  Breathing problems.  Falling suddenly.  Confusion.  Head nodding.  Eye blinking or fluttering.  Lip smacking.  Drooling.  Rapid eye movements.  Grunting.  Loss of bladder control and bowel control.  Staring.  Unresponsiveness.    Some people have symptoms right before a seizure happens (aura) and right after a seizure happens. Symptoms of an aura include:    Fear or anxiety.  Nausea.  Feeling like the room is spinning (vertigo).  A feeling of having seen or heard something before (kay vu).  Odd tastes or smells.  Changes in vision, such as seeing flashing lights or spots.    Symptoms that may follow a seizure include:    Confusion.  Sleepiness.  Headache.  Weakness of one side of the body.    Follow these instructions at home:  Medicines     Image   Take over-the-counter and prescription medicines only as told by your health care provider.  Avoid any substances that may prevent your medicine from working properly, such as alcohol.  Activity     Do not drive, swim, or do any other activities that would be dangerous if you had another seizure. Wait until your health care provider approves.  If you live in the [place] check with your local DMV (department of motor vehicles) to find out about the local driving laws. Each state has specific rules about when you can legally return to driving.  Get enough rest. Lack of sleep can make seizures more likely to occur.  Educating others     Teach friends and family what to do if you have a seizure. They should:    Lay you on the ground to prevent a fall.  Cushion your head and body.  Loosen any tight clothing around your neck.  Turn you on your side. If vomiting occurs, this helps keep your airway clear.  Stay with you until you recover.  Not hold you down. Holding you down will not stop the seizure.  Not put anything in your mouth.  Know whether or not you need emergency care.    General instructions     Contact your health care provider each time you have a seizure.  Avoid anything that has ever triggered a seizure for you.  Keep a seizure diary. Record what you remember about each seizure, especially anything that might have triggered the seizure.  Keep all follow-up visits as told by your health care provider. This is important.  Contact a health care provider if:  You have another seizure.  You have seizures more often.  Your seizure symptoms change.  You continue to have seizures with treatment.  You have symptoms of an infection or illness. They might increase your risk of having a seizure.  Get help right away if:  You have a seizure:    That lasts longer than 5 minutes.  That is different than previous seizures.  That leaves you unable to speak or use a part of your body.  That makes it harder to breathe.  After a head injury.    You have:    Multiple seizures in a row.  Confusion or a severe headache right after a seizure.    You are having seizures more often.  You do not wake up immediately after a seizure.  You injure yourself during a seizure.  These symptoms may represent a serious problem that is an emergency. Do not wait to see if the symptoms will go away. Get medical help right away. Call your local emergency services (911 in the [place] Do not drive yourself to the hospital.     This information is not intended to replace advice given to you by your health care provider. Make sure you discuss any questions you have with your health care provider.

## 2019-08-23 NOTE — ED ADULT NURSE NOTE - OBJECTIVE STATEMENT
As pt sister and Nephew, pt had seizures outside home which was witnessed by bystander who later called EMT, pt confirmed the seizure, said he didn't take his medications after D/C here few days ago. Pt had an #18gauge on arrival, but no medication was given by EMT. pt is calm, answered questions, but did not confirm if his head hit the floor.

## 2019-08-23 NOTE — ED PROVIDER NOTE - ATTENDING CONTRIBUTION TO CARE
HPI-As noted above, interviewed the patient myself & agreed w/ findings, additionally: 64M pmhx seizure d/o p/w seizure activity after a verbal alteration at home. According to family, total time of seizure activity noted for 15 min. Currently pt is anox3, states he has been compliant with medication.     ROS- As noted above & additionally:   (Positive): seizure    (Negative):  fever, chills, n/v, cp,  pleuritic cp, sob, palpitations, diaphoresis, cough, ha/dizziness, numbness/tingling, neck pain/ stiffness, abd pain, diarrhea, constipation, melena/brbpr, urinary symptoms, trauma, weakness, edema, calf pain/swelling/erythema, sick contacts, recent travel or rash.    Vital Signs: I have reviewed the initial VS.     PE- As noted above additionally:  General: Awake, alert, (-) acute distress  Eyes: PERRL, EOMI, nl  lids & conjunctivae, (-) icterus  ENT: nl ext inspection, pink/moist membranes, pharynx nl, (-) pharyngeal erythema/exudate  CV: S1S2, RRR, 2+pulses b/l, warm/well-perfused, (-) edema, (-) murmur/gallops/rubs/JVD  Respiratory: CTAB, nl RR/effort, (-) resp distress, wheezing/rales/rhonchi, nl voice, speaking full sentences, no retractions, no stridor  Abdomen: Soft, nl BS, (-)tender, (-)distended/guarding/rebound/CVA tenderness  Musculoskeletal: FROM all 4 extremities, N/V intact, pelvis stable, (-) TLS spinal tender/deform/step-offs, (-)sam tender/deform, stable gait  Neck: FROM neck, supple, trachea midline (-)meningismus, (-) c-spine tender/step-offs/lymphadenopathy  Integumentary: nl color for race, warm and dry, (-)rash  Neuro: Oriented x3, CN 2-12 grossly intact motor/sensory/gait/cerebellar  Psych: Oriented x3, nl normal/affect    LABS/ IMAGING- p Labs    reviewed- previous ed stay- noted to have subtherapeutic Depakote levels. CTH negative    RX- Depakote

## 2019-08-23 NOTE — ED PROVIDER NOTE - NS ED ROS FT
Eyes:  No visual changes, eye pain.   ENMT:  No hearing changes, pain, discharge or infections. No neck pain or stiffness.  Cardiac:  No chest pain, SOB or edema.   Respiratory:  No cough or respiratory distress. No hemoptysis. No history of asthma.  GI:  No nausea, vomiting, diarrhea or abdominal pain.  :  No dysuria, frequency or burning. No urinary incontinence.   MS:  No joint pain or back pain.  Neuro:  +hx of seizure. +LOC. No headache.   Skin:  No skin rash.   Endocrine: No history of thyroid disease or diabetes.

## 2019-08-23 NOTE — ED PROVIDER NOTE - PROGRESS NOTE DETAILS
Case s/o to  Spoke with neurology NP who recommended that patient needs to be admitted for break through seizures and video EEG. Recommend Keppra 1g Q12. Spoke with patient about being admitted to hospital. Discussed risks of leaving without being treated/fully evaluated. Patient refused; does not want to stay. Offered Keppra, patient refused. Spoke with patient about being admitted to hospital. Discussed risks of leaving without being treated/fully evaluated. Patient refused; does not want to stay. Offered Keppra, patient refused.    The patient wishes to leave against medical advice.  I have discussed the risks, benefits and alternatives (including the possibility of worsening of disease, pain, permanent disability, and/or death) with the patient and his/her family (if available).  The patient voices understanding of these risks, benefits, and alternatives and still wishes to sign out against medical advice.  The patient is awake, alert, oriented  x 3 and has demonstrated capacity to refuse/direct care.  I have advised the patient that they can and should return immediately should they develop any worse/different/additional symptoms, or if they change their mind and want to continue their care.

## 2019-08-23 NOTE — ED PROVIDER NOTE - PHYSICAL EXAMINATION
CONSTITUTIONAL: Patient appears tired/post-ictal.   SKIN: warm, dry  HEAD: Normocephalic; atraumatic.  EYES: PERRL, EOMI, normal sclera and conjunctiva   ENT: No nasal discharge; airway clear.  NECK: Supple; non tender.  CARD: S1, S2 normal; no murmurs, gallops, or rubs. Regular rate and rhythm.   RESP: No wheezes, rales or rhonchi.  ABD: soft ntnd  EXT: Patient moving all four extremities.  No clubbing, cyanosis or edema.   LYMPH: No acute cervical adenopathy.  NEURO: Alert, oriented, grossly unremarkable. CN 2-12 intact.   PSYCH: appropriate, cooperative.

## 2019-08-23 NOTE — ED PROVIDER NOTE - CARE PROVIDER_API CALL
Elvis Orona)  EEGEpilepsy; Neurology  [address] [place] [address], [address]  [place] [address]  Phone: [phone]  Fax: [phone]  Follow Up Time: 1-3 Days

## 2019-08-23 NOTE — ED ADULT TRIAGE NOTE - CHIEF COMPLAINT QUOTE
ADAN s/p ryan. as per EMS, pt had a seizure in the street. was witnessed by a bystander who called 911. as per EMS, pt's BP was low on scene.

## 2019-08-24 VITALS
DIASTOLIC BLOOD PRESSURE: 69 MMHG | OXYGEN SATURATION: 97 % | SYSTOLIC BLOOD PRESSURE: 126 MMHG | HEART RATE: 55 BPM | RESPIRATION RATE: 18 BRPM

## 2019-11-12 ENCOUNTER — APPOINTMENT (OUTPATIENT)
Dept: NEUROLOGY | Facility: CLINIC | Age: 64
End: 2019-11-12
Payer: MEDICARE

## 2019-11-12 ENCOUNTER — OUTPATIENT (OUTPATIENT)
Dept: OUTPATIENT SERVICES | Facility: HOSPITAL | Age: 64
LOS: 1 days | Discharge: HOME | End: 2019-11-12

## 2019-11-12 VITALS
WEIGHT: 178 LBS | DIASTOLIC BLOOD PRESSURE: 58 MMHG | HEART RATE: 66 BPM | BODY MASS INDEX: 23.59 KG/M2 | SYSTOLIC BLOOD PRESSURE: 92 MMHG | HEIGHT: 73 IN

## 2019-11-12 PROCEDURE — 99203 OFFICE O/P NEW LOW 30 MIN: CPT

## 2019-11-12 NOTE — HISTORY OF PRESENT ILLNESS
[Formal Caregiver] : formal caregiver [Family Member] : family member [FreeTextEntry8] : 64 year old male patient presented for evaluation.\par \par Known to have seizure disorder \par \par Patient's last seizure was Aug 2019 when the patient was admitted to the ER and discharged the same day for seizure. Seizure was witnessed by mother and nephew. Patient's whole body was shaking. No eye rolling. No foaming at the mouth. No urinary incontinence. Patient did not hit is head. Seizure lasted approx 15 minutes. Upon arrival of EMS, patient was given 10 versed which improved his symptoms. Patient was post ictal upon arrival to ED.\par CT done in ED showed No evidence for acute intracranial hemorrhage. 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 \par Patient notes relatively recent (last month of tremor involving head)\par \par Currently Phenytoin 100 mg oral capsule, extended release: 2 cap(s) orally in the morning and 3 tabs orally at bedtime, Depakote  mg oral tablet, extended release: 1 tab(s) orally once a day, PHENobarbital 32.4 mg oral tablet qd\par

## 2019-11-12 NOTE — PHYSICAL EXAM
[No Acute Distress] : no acute distress [Well Nourished] : well nourished [Well Developed] : well developed [PERRL] : pupils equal round and reactive to light [Normal Sclera/Conjunctiva] : normal sclera/conjunctiva [No JVD] : no jugular venous distention [Normal Outer Ear/Nose] : the outer ears and nose were normal in appearance [No Lymphadenopathy] : no lymphadenopathy [No Respiratory Distress] : no respiratory distress  [Normal Rate] : normal rate  [No Accessory Muscle Use] : no accessory muscle use [Clear to Auscultation] : lungs were clear to auscultation bilaterally [Regular Rhythm] : with a regular rhythm [Normal S1, S2] : normal S1 and S2 [No Carotid Bruits] : no carotid bruits [No Abdominal Bruit] : a ~M bruit was not heard ~T in the abdomen [Soft] : abdomen soft [Non Tender] : non-tender [Normal Bowel Sounds] : normal bowel sounds [No CVA Tenderness] : no CVA  tenderness [No Joint Swelling] : no joint swelling [Coordination Grossly Intact] : coordination grossly intact [No Focal Deficits] : no focal deficits [Normal Gait] : normal gait [Normal Insight/Judgement] : insight and judgment were intact

## 2019-11-12 NOTE — ASSESSMENT
[FreeTextEntry1] : 64 year old male patient presented for evaluation.\par \par Known to have seizure disorder \par Patient's last seizure was Aug 2019.No seizures since that episode. \par \par Advised for VEEG to assess adequacy of treatment\par Keep current regimen for now:\par - Phenytoin 100 mg oral capsule, extended release: 2 cap(s) orally in the morning and 3 tabs orally at bedtime, - - Depakote  mg oral tablet, extended release: 1 tab(s) orally once a day\par - PHENobarbital 32.4 mg oral tablet qd

## 2019-11-12 NOTE — REVIEW OF SYSTEMS
[Memory Loss] : memory loss [Unsteady Walk] : ataxia [Insomnia] : insomnia [Chills] : no chills [Fever] : no fever [Night Sweats] : no night sweats [Fatigue] : no fatigue [Recent Change In Weight] : ~T no recent weight change [Discharge] : no discharge [Redness] : no redness [Pain] : no pain [Vision Problems] : no vision problems [Hearing Loss] : no hearing loss [Itching] : no itching [Earache] : no earache [Nasal Discharge] : no nasal discharge [Sore Throat] : no sore throat [Chest Pain] : no chest pain [Orthopena] : no orthopnea [Palpitations] : no palpitations [Paroxysmal Nocturnal Dyspnea] : no paroxysmal nocturnal dyspnea [Shortness Of Breath] : no shortness of breath [Wheezing] : no wheezing [Cough] : no cough [Dyspnea on Exertion] : not dyspnea on exertion [Abdominal Pain] : no abdominal pain [Nausea] : no nausea [Vomiting] : no vomiting [Constipation] : no constipation [Heartburn] : no heartburn [Incontinence] : no incontinence [Dysuria] : no dysuria [Hesitancy] : no hesitancy [Nocturia] : no nocturia [Hematuria] : no hematuria [Joint Pain] : no joint pain [Frequency] : no frequency [Joint Stiffness] : no joint stiffness [Back Pain] : no back pain [Itching] : no itching [Skin Rash] : no skin rash [Headache] : no headache [Dizziness] : no dizziness [Fainting] : no fainting [Confusion] : no confusion [Suicidal] : not suicidal [Depression] : no depression [Anxiety] : no anxiety [Easy Bleeding] : no easy bleeding [Easy Bruising] : no easy bruising

## 2019-11-12 NOTE — END OF VISIT
[] : Resident [FreeTextEntry3] : Patient seen and examined and history obtained from sister, aide and EMR.  Patient unable to give a reliable history.\par Patient with epilepsy since 24yo last seizure in august 2019.  Compliant with medications.  Has cognitive dysfunction and has difficult time with his ADLs for which the aide helps him during the mornings.\par \par Plan\par 1. VEEG\par Iraidae Angelina [phone]\par Radha Sister [phone]\par 2. Continue current medications

## 2019-11-12 NOTE — HEALTH RISK ASSESSMENT
[] : Yes [No] : No [One fall no injury in past year] : Patient reported one fall in the past year without injury [2] : 1) Little interest or pleasure doing things for more than half of the days (2) [0] : 2) Feeling down, depressed, or hopeless: Not at all (0) [de-identified] : None [de-identified] : 70 pack years

## 2019-11-22 ENCOUNTER — EMERGENCY (EMERGENCY)
Facility: HOSPITAL | Age: 64
LOS: 0 days | Discharge: HOME | End: 2019-11-22
Admitting: HOSPITALIST

## 2019-11-22 ENCOUNTER — INPATIENT (INPATIENT)
Facility: HOSPITAL | Age: 64
LOS: 0 days | Discharge: LEFT BEFORE DISCHARGE | End: 2019-11-22
Attending: HOSPITALIST | Admitting: HOSPITALIST
Payer: MEDICARE

## 2019-11-22 VITALS
HEART RATE: 81 BPM | DIASTOLIC BLOOD PRESSURE: 61 MMHG | SYSTOLIC BLOOD PRESSURE: 100 MMHG | RESPIRATION RATE: 18 BRPM | OXYGEN SATURATION: 99 %

## 2019-11-22 VITALS
TEMPERATURE: 97 F | HEART RATE: 70 BPM | SYSTOLIC BLOOD PRESSURE: 96 MMHG | DIASTOLIC BLOOD PRESSURE: 55 MMHG | OXYGEN SATURATION: 99 % | RESPIRATION RATE: 18 BRPM

## 2019-11-22 DIAGNOSIS — I10 ESSENTIAL (PRIMARY) HYPERTENSION: ICD-10-CM

## 2019-11-22 DIAGNOSIS — R56.9 UNSPECIFIED CONVULSIONS: ICD-10-CM

## 2019-11-22 DIAGNOSIS — F17.210 NICOTINE DEPENDENCE, CIGARETTES, UNCOMPLICATED: ICD-10-CM

## 2019-11-22 LAB
ALBUMIN SERPL ELPH-MCNC: 4.1 G/DL — SIGNIFICANT CHANGE UP (ref 3.5–5.2)
ALP SERPL-CCNC: 60 U/L — SIGNIFICANT CHANGE UP (ref 30–115)
ALT FLD-CCNC: 19 U/L — SIGNIFICANT CHANGE UP (ref 0–41)
ANION GAP SERPL CALC-SCNC: 12 MMOL/L — SIGNIFICANT CHANGE UP (ref 7–14)
AST SERPL-CCNC: 28 U/L — SIGNIFICANT CHANGE UP (ref 0–41)
BASOPHILS # BLD AUTO: 0.04 K/UL — SIGNIFICANT CHANGE UP (ref 0–0.2)
BASOPHILS NFR BLD AUTO: 0.7 % — SIGNIFICANT CHANGE UP (ref 0–1)
BILIRUB SERPL-MCNC: <0.2 MG/DL — SIGNIFICANT CHANGE UP (ref 0.2–1.2)
BUN SERPL-MCNC: 19 MG/DL — SIGNIFICANT CHANGE UP (ref 10–20)
CALCIUM SERPL-MCNC: 8.5 MG/DL — SIGNIFICANT CHANGE UP (ref 8.5–10.1)
CHLORIDE SERPL-SCNC: 103 MMOL/L — SIGNIFICANT CHANGE UP (ref 98–110)
CO2 SERPL-SCNC: 27 MMOL/L — SIGNIFICANT CHANGE UP (ref 17–32)
CREAT SERPL-MCNC: 1.2 MG/DL — SIGNIFICANT CHANGE UP (ref 0.7–1.5)
EOSINOPHIL # BLD AUTO: 0.35 K/UL — SIGNIFICANT CHANGE UP (ref 0–0.7)
EOSINOPHIL NFR BLD AUTO: 6.3 % — SIGNIFICANT CHANGE UP (ref 0–8)
GLUCOSE SERPL-MCNC: 97 MG/DL — SIGNIFICANT CHANGE UP (ref 70–99)
HCT VFR BLD CALC: 36.6 % — LOW (ref 42–52)
HGB BLD-MCNC: 12 G/DL — LOW (ref 14–18)
IMM GRANULOCYTES NFR BLD AUTO: 0.5 % — HIGH (ref 0.1–0.3)
LYMPHOCYTES # BLD AUTO: 1.99 K/UL — SIGNIFICANT CHANGE UP (ref 1.2–3.4)
LYMPHOCYTES # BLD AUTO: 35.7 % — SIGNIFICANT CHANGE UP (ref 20.5–51.1)
MCHC RBC-ENTMCNC: 30.7 PG — SIGNIFICANT CHANGE UP (ref 27–31)
MCHC RBC-ENTMCNC: 32.8 G/DL — SIGNIFICANT CHANGE UP (ref 32–37)
MCV RBC AUTO: 93.6 FL — SIGNIFICANT CHANGE UP (ref 80–94)
MONOCYTES # BLD AUTO: 0.42 K/UL — SIGNIFICANT CHANGE UP (ref 0.1–0.6)
MONOCYTES NFR BLD AUTO: 7.5 % — SIGNIFICANT CHANGE UP (ref 1.7–9.3)
NEUTROPHILS # BLD AUTO: 2.74 K/UL — SIGNIFICANT CHANGE UP (ref 1.4–6.5)
NEUTROPHILS NFR BLD AUTO: 49.3 % — SIGNIFICANT CHANGE UP (ref 42.2–75.2)
NRBC # BLD: 0 /100 WBCS — SIGNIFICANT CHANGE UP (ref 0–0)
PHENOBARB SERPL-MCNC: 26.1 UG/ML — SIGNIFICANT CHANGE UP (ref 15–40)
PHENYTOIN FREE SERPL-MCNC: 6.9 UG/ML — LOW (ref 10–20)
PLATELET # BLD AUTO: 150 K/UL — SIGNIFICANT CHANGE UP (ref 130–400)
POTASSIUM SERPL-MCNC: 4.5 MMOL/L — SIGNIFICANT CHANGE UP (ref 3.5–5)
POTASSIUM SERPL-SCNC: 4.5 MMOL/L — SIGNIFICANT CHANGE UP (ref 3.5–5)
PROT SERPL-MCNC: 6.4 G/DL — SIGNIFICANT CHANGE UP (ref 6–8)
RBC # BLD: 3.91 M/UL — LOW (ref 4.7–6.1)
RBC # FLD: 13.9 % — SIGNIFICANT CHANGE UP (ref 11.5–14.5)
SODIUM SERPL-SCNC: 142 MMOL/L — SIGNIFICANT CHANGE UP (ref 135–146)
TROPONIN T SERPL-MCNC: <0.01 NG/ML — SIGNIFICANT CHANGE UP
VALPROATE SERPL-MCNC: 59 UG/ML — SIGNIFICANT CHANGE UP (ref 50–100)
WBC # BLD: 5.57 K/UL — SIGNIFICANT CHANGE UP (ref 4.8–10.8)
WBC # FLD AUTO: 5.57 K/UL — SIGNIFICANT CHANGE UP (ref 4.8–10.8)

## 2019-11-22 PROCEDURE — 99285 EMERGENCY DEPT VISIT HI MDM: CPT | Mod: 25

## 2019-11-22 PROCEDURE — 36000 PLACE NEEDLE IN VEIN: CPT

## 2019-11-22 PROCEDURE — 93010 ELECTROCARDIOGRAM REPORT: CPT

## 2019-11-22 PROCEDURE — 71045 X-RAY EXAM CHEST 1 VIEW: CPT | Mod: 26

## 2019-11-22 PROCEDURE — 76937 US GUIDE VASCULAR ACCESS: CPT | Mod: 26

## 2019-11-22 RX ORDER — SODIUM CHLORIDE 9 MG/ML
1000 INJECTION, SOLUTION INTRAVENOUS ONCE
Refills: 0 | Status: COMPLETED | OUTPATIENT
Start: 2019-11-22 | End: 2019-11-22

## 2019-11-22 RX ADMIN — SODIUM CHLORIDE 1000 MILLILITER(S): 9 INJECTION, SOLUTION INTRAVENOUS at 22:05

## 2019-11-22 NOTE — ED PROVIDER NOTE - PHYSICAL EXAMINATION
CONSTITUTIONAL: In no acute distress, confused  SKIN: warm, dry  HEAD: Normocephalic; atraumatic.  EYES: no conjunctival injection. PERRL.   ENT: No nasal discharge; airway clear.  NECK: Supple; non tender.  CARD: S1, S2 normal; no murmurs, gallops, or rubs. Regular rate and rhythm.   RESP: No wheezes, rales or rhonchi.  ABD: soft ntnd, no rebound tenderness or guarding  EXT: Normal ROM.  No clubbing, cyanosis or edema.   LYMPH: No acute cervical adenopathy.  NEURO: Alert, oriented x 2, grossly unremarkable, speech slurred, 5/5 strength in all 4 extremities, normal sensation in all extremities  PSYCH: Cooperative, appropriate.

## 2019-11-22 NOTE — ED PROVIDER NOTE - OBJECTIVE STATEMENT
The patient is a 65 yo male with PMHx of seizures and HTN The patient is a 63 yo male with PMHx of seizures and HTN presenting after seizure event x 30 min PTA. The patient was found on the ground by nephew. He was crouched on the ground and had the episode for about 8 minutes. EMS came and gave versed 10 mg IM. Patient is confused after the event. Patient denies fever, chills, chest pain, SOB, abdominal pain, nausea, vomiting, diarrhea. He may have fallen. Does not know if hit head or not. No recent changes in medication or OTC medication. Had recent cold. Patient does not remember neurologist name. He claims to be complaint with his seizure medications. He took his medications today but did not take the night dose because of seizure. Last seizure 1.5 months ago and he said that his medication levels were low that day.

## 2019-11-22 NOTE — ED PROVIDER NOTE - CLINICAL SUMMARY MEDICAL DECISION MAKING FREE TEXT BOX
65 yo man with breakthrough seizure lasting 8 minutes per witnesses improved after EMS gave 8 mg IM midazolam.  Workup otherwise ok.  Neuro, Dr. Herrera called.  Requests epilepsy unit admission at [facility].  After patient was admitted and awaiting transfer to [facility].  patient pulled out his IV and eloped.

## 2019-11-22 NOTE — ED PROVIDER NOTE - PROGRESS NOTE DETAILS
Spoke with Dr. Herrera who states that patient is a good candidate for epilepsy unit in the [facility] site. He states that patient can be maintained on present medications at this time.

## 2019-11-22 NOTE — ED ADULT NURSE REASSESSMENT NOTE - NS ED NURSE REASSESS COMMENT FT1
Pt got up from stretcher, states he is leaving. Pt refusing transfer to [facility] for admission despite education and explanation of risks. Pt son present at bedside, also stating pt leaving AMA. ED MD made aware. [facility] called, nurse Vira and admitting MD De La Torre notified.

## 2019-11-22 NOTE — ED PROVIDER NOTE - NS ED ROS FT
Review of Systems:  •	CONSTITUTIONAL - No fever, No diaphoresis, No weight change  •	SKIN - No rash  •	HEMATOLOGIC - No abnormal bleeding or bruising  •	EYES - No eye pain, No blurred vision  •	ENT - No change in hearing, No sore throat, No neck pain, No rhinorrhea, No ear pain  •	RESPIRATORY - No shortness of breath, No cough  •	CARDIAC -No chest pain, No palpitations  •	GI - No abdominal pain, No nausea, No vomiting, No diarrhea, No constipation, No bright red blood per rectum or melena. No flank pain  •                 - No dysuria, frequency, hematuria.   •	ENDO - No polydypsia, No polyuria, No heat/cold intolerance  •	MUSCULOSKELETAL - No joint paint, No swelling, No back pain  •	NEUROLOGIC - No numbness, No focal weakness, No headache, No dizziness, + seizure event, + confused  All other systems negative, unless specified in HPI

## 2019-11-22 NOTE — ED ADULT NURSE NOTE - OBJECTIVE STATEMENT
Patient BIBA after being found on floor by son s/p seizure. Son states that patient is aware when he's about to have seizures and usually calls son when it occurs. Patient found on floor lying by table. Denies hitting head or any pain at this time. States that he had a seizure for approximately 8 minutes. Patient has hx of seizures and HTN. Patient states he takes his medications regularly and was about to take his medication but wanted to eat first. EMS administered 10mg IM versed in the field. AxOx4 at this time, able to follow commands, and move all extremeties.

## 2019-11-22 NOTE — ED PROCEDURE NOTE - ATTENDING CONTRIBUTION TO CARE
I was present for and supervised the key and critical aspects of the procedures performed during the care of the patient.  POCUS peripheral IV.

## 2019-11-22 NOTE — ED PROVIDER NOTE - ATTENDING CONTRIBUTION TO CARE
I personally evaluated the patient. I reviewed the Resident’s or Physician Assistant’s note (as assigned above), and agree with the findings and plan except as documented in my note.  65 yo man with known seizure disorder presents with EMS after had about 8 minute seizure as per nephew who called 911 when this occurred.  On EMS arrival, they also felt patient was having seizure and IM midazolam 10 mg was given with resolution after less than one minute.  ? recent URI.  Denies any OTC med use.  Denies any recent fever or chills.  Has been compliant with his 3 seizure meds.  Labs done.  EKG ok.  Neuro called.  Requested Epilepsy unit at [facility] for admission to optimize his care.  Patient was admitted [facility] to epilepsy unit.

## 2019-12-02 DIAGNOSIS — G40.909 EPILEPSY, UNSPECIFIED, NOT INTRACTABLE, WITHOUT STATUS EPILEPTICUS: ICD-10-CM

## 2019-12-02 DIAGNOSIS — Z79.899 OTHER LONG TERM (CURRENT) DRUG THERAPY: ICD-10-CM

## 2019-12-02 DIAGNOSIS — I10 ESSENTIAL (PRIMARY) HYPERTENSION: ICD-10-CM

## 2019-12-02 DIAGNOSIS — R56.9 UNSPECIFIED CONVULSIONS: ICD-10-CM

## 2019-12-02 DIAGNOSIS — F17.210 NICOTINE DEPENDENCE, CIGARETTES, UNCOMPLICATED: ICD-10-CM

## 2019-12-07 ENCOUNTER — EMERGENCY (EMERGENCY)
Facility: HOSPITAL | Age: 64
LOS: 0 days | Discharge: HOME | End: 2019-12-07
Attending: EMERGENCY MEDICINE
Payer: MEDICARE

## 2019-12-07 VITALS
SYSTOLIC BLOOD PRESSURE: 98 MMHG | TEMPERATURE: 98 F | RESPIRATION RATE: 18 BRPM | HEART RATE: 67 BPM | DIASTOLIC BLOOD PRESSURE: 57 MMHG | OXYGEN SATURATION: 99 %

## 2019-12-07 VITALS
SYSTOLIC BLOOD PRESSURE: 118 MMHG | OXYGEN SATURATION: 99 % | HEART RATE: 68 BPM | TEMPERATURE: 98 F | DIASTOLIC BLOOD PRESSURE: 69 MMHG | RESPIRATION RATE: 18 BRPM

## 2019-12-07 DIAGNOSIS — Z02.9 ENCOUNTER FOR ADMINISTRATIVE EXAMINATIONS, UNSPECIFIED: ICD-10-CM

## 2019-12-07 LAB
ALBUMIN SERPL ELPH-MCNC: 3.9 G/DL — SIGNIFICANT CHANGE UP (ref 3.5–5.2)
ALP SERPL-CCNC: 61 U/L — SIGNIFICANT CHANGE UP (ref 30–115)
ALT FLD-CCNC: 15 U/L — SIGNIFICANT CHANGE UP (ref 0–41)
ANION GAP SERPL CALC-SCNC: 14 MMOL/L — SIGNIFICANT CHANGE UP (ref 7–14)
AST SERPL-CCNC: 22 U/L — SIGNIFICANT CHANGE UP (ref 0–41)
BASOPHILS # BLD AUTO: 0.05 K/UL — SIGNIFICANT CHANGE UP (ref 0–0.2)
BASOPHILS NFR BLD AUTO: 0.6 % — SIGNIFICANT CHANGE UP (ref 0–1)
BILIRUB SERPL-MCNC: <0.2 MG/DL — SIGNIFICANT CHANGE UP (ref 0.2–1.2)
BUN SERPL-MCNC: 24 MG/DL — HIGH (ref 10–20)
CALCIUM SERPL-MCNC: 8.9 MG/DL — SIGNIFICANT CHANGE UP (ref 8.5–10.1)
CHLORIDE SERPL-SCNC: 100 MMOL/L — SIGNIFICANT CHANGE UP (ref 98–110)
CO2 SERPL-SCNC: 23 MMOL/L — SIGNIFICANT CHANGE UP (ref 17–32)
CREAT SERPL-MCNC: 1.1 MG/DL — SIGNIFICANT CHANGE UP (ref 0.7–1.5)
EOSINOPHIL # BLD AUTO: 0.56 K/UL — SIGNIFICANT CHANGE UP (ref 0–0.7)
EOSINOPHIL NFR BLD AUTO: 7.2 % — SIGNIFICANT CHANGE UP (ref 0–8)
GLUCOSE SERPL-MCNC: 84 MG/DL — SIGNIFICANT CHANGE UP (ref 70–99)
HCT VFR BLD CALC: 41.7 % — LOW (ref 42–52)
HGB BLD-MCNC: 13.6 G/DL — LOW (ref 14–18)
IMM GRANULOCYTES NFR BLD AUTO: 0.6 % — HIGH (ref 0.1–0.3)
LACTATE SERPL-SCNC: 1.7 MMOL/L — SIGNIFICANT CHANGE UP (ref 0.7–2)
LYMPHOCYTES # BLD AUTO: 2.6 K/UL — SIGNIFICANT CHANGE UP (ref 1.2–3.4)
LYMPHOCYTES # BLD AUTO: 33.5 % — SIGNIFICANT CHANGE UP (ref 20.5–51.1)
MAGNESIUM SERPL-MCNC: 1.7 MG/DL — LOW (ref 1.8–2.4)
MCHC RBC-ENTMCNC: 30.8 PG — SIGNIFICANT CHANGE UP (ref 27–31)
MCHC RBC-ENTMCNC: 32.6 G/DL — SIGNIFICANT CHANGE UP (ref 32–37)
MCV RBC AUTO: 94.6 FL — HIGH (ref 80–94)
MONOCYTES # BLD AUTO: 0.57 K/UL — SIGNIFICANT CHANGE UP (ref 0.1–0.6)
MONOCYTES NFR BLD AUTO: 7.3 % — SIGNIFICANT CHANGE UP (ref 1.7–9.3)
NEUTROPHILS # BLD AUTO: 3.93 K/UL — SIGNIFICANT CHANGE UP (ref 1.4–6.5)
NEUTROPHILS NFR BLD AUTO: 50.8 % — SIGNIFICANT CHANGE UP (ref 42.2–75.2)
NRBC # BLD: 0 /100 WBCS — SIGNIFICANT CHANGE UP (ref 0–0)
PHENOBARB SERPL-MCNC: 31.1 UG/ML — SIGNIFICANT CHANGE UP (ref 15–40)
PHENYTOIN FREE SERPL-MCNC: 7.1 UG/ML — LOW (ref 10–20)
PLATELET # BLD AUTO: 143 K/UL — SIGNIFICANT CHANGE UP (ref 130–400)
POTASSIUM SERPL-MCNC: 5 MMOL/L — SIGNIFICANT CHANGE UP (ref 3.5–5)
POTASSIUM SERPL-SCNC: 5 MMOL/L — SIGNIFICANT CHANGE UP (ref 3.5–5)
PROT SERPL-MCNC: 6.5 G/DL — SIGNIFICANT CHANGE UP (ref 6–8)
RBC # BLD: 4.41 M/UL — LOW (ref 4.7–6.1)
RBC # FLD: 14 % — SIGNIFICANT CHANGE UP (ref 11.5–14.5)
SODIUM SERPL-SCNC: 137 MMOL/L — SIGNIFICANT CHANGE UP (ref 135–146)
TROPONIN T SERPL-MCNC: <0.01 NG/ML — SIGNIFICANT CHANGE UP
VALPROATE SERPL-MCNC: 79 UG/ML — SIGNIFICANT CHANGE UP (ref 50–100)
WBC # BLD: 7.76 K/UL — SIGNIFICANT CHANGE UP (ref 4.8–10.8)
WBC # FLD AUTO: 7.76 K/UL — SIGNIFICANT CHANGE UP (ref 4.8–10.8)

## 2019-12-07 PROCEDURE — 71046 X-RAY EXAM CHEST 2 VIEWS: CPT | Mod: 26

## 2019-12-07 PROCEDURE — 93010 ELECTROCARDIOGRAM REPORT: CPT

## 2019-12-07 PROCEDURE — 99285 EMERGENCY DEPT VISIT HI MDM: CPT | Mod: GC

## 2019-12-07 PROCEDURE — 70450 CT HEAD/BRAIN W/O DYE: CPT | Mod: 26

## 2019-12-07 RX ORDER — SODIUM CHLORIDE 9 MG/ML
1000 INJECTION, SOLUTION INTRAVENOUS ONCE
Refills: 0 | Status: COMPLETED | OUTPATIENT
Start: 2019-12-07 | End: 2019-12-07

## 2019-12-07 RX ORDER — MAGNESIUM OXIDE 400 MG ORAL TABLET 241.3 MG
400 TABLET ORAL ONCE
Refills: 0 | Status: COMPLETED | OUTPATIENT
Start: 2019-12-07 | End: 2019-12-07

## 2019-12-07 RX ADMIN — SODIUM CHLORIDE 1000 MILLILITER(S): 9 INJECTION, SOLUTION INTRAVENOUS at 21:55

## 2019-12-07 RX ADMIN — SODIUM CHLORIDE 1000 MILLILITER(S): 9 INJECTION, SOLUTION INTRAVENOUS at 20:26

## 2019-12-07 RX ADMIN — SODIUM CHLORIDE 1000 MILLILITER(S): 9 INJECTION, SOLUTION INTRAVENOUS at 21:00

## 2019-12-07 NOTE — ED PROVIDER NOTE - ATTENDING CONTRIBUTION TO CARE
patient was BIB family for evaluation of seizure, no trauma, patient denies any symptoms, denies cp/sob/n/v/abd pain, no HA, no neck pain, h/o similar symptoms in the past with seizures, patient is complaint with his medications, no f/c/rash.   patient is asymptomatic in ED, and denies any symptoms.   vital noted  no external signs of trauma noted  lungs: CTA, no crackles  abd: +BS, NT, ND, soft  CNS: awake, alert, o x 3, no focal neurologic deficits  A/P: h/o Seizure  labs, neurology consult  CT brain, reevaluation

## 2019-12-07 NOTE — ED PROVIDER NOTE - CLINICAL SUMMARY MEDICAL DECISION MAKING FREE TEXT BOX
Patient states that is feeling lot better, want to go home and f/u as outpatient. Discussed with patient about the need for further medical care, risks of leaving AMA, PT verbalized understanding and still wanted to leave AMA. Patient is awake, alert, o x 3, coherent, is capacitated to make decisions. Discussed with patient in detail about clinical condition, results of the diagnostic studies and the need for close out patient follow up. Detail aftercare instructions and return precautions are given.

## 2019-12-07 NOTE — ED PROVIDER NOTE - NS ED ROS FT
Constitutional: (-) fever  Eyes/ENT: (-) blurry vision, (-) epistaxis  Cardiovascular: (-) chest pain, (-) syncope  Respiratory: (-) cough, (-) shortness of breath  Gastrointestinal: (-) vomiting, (-) diarrhea  Musculoskeletal: (-) neck pain, (-) back pain, (-) joint pain  Integumentary: (-) rash, (-) edema  Neurological: (-) headache, (+) altered mental status -seizure  Psychiatric: (-) hallucinations  Allergic/Immunologic: (-) pruritus

## 2019-12-07 NOTE — ED PROVIDER NOTE - PATIENT PORTAL LINK FT
You can access the FollowMyHealth Patient Portal offered by [facility] by registering at the following website: [email or web address]. By joining [employer]’s FollowMyHealth portal, you will also be able to view your health information using other applications (apps) compatible with our system.

## 2019-12-07 NOTE — ED PROVIDER NOTE - PROGRESS NOTE DETAILS
neurology Pa evaluated pt recommended admitted to epileptic unit. Patient is awake/alert/interactive with normal mental status and normal neurologic function. Patient reports no SI/HI and demonstrates normal thought processes with no evidence of intoxication, delirium, delusions or hallucinations. Patient requesting to leave against medical advice at this time. Advised patient of potential risks of leaving AMA which include potential disability or death. Attempted to convince patient to stay and continue work up/treatment and patient refused. Patient has capacity to make medical decisions at this time and will be signed out AMA. Patient instructed to follow up with his primary care provider as an outpatient and is aware they can return to the emergency department at any time for evaluation.    Multiple attempts done to convince pt to stay.  family at bedside attempting too, but pt understands and does not want to stay. pt does not want to stay for last result of dilantin, and does not owant to wait for urine

## 2019-12-07 NOTE — ED PROVIDER NOTE - PRINCIPAL DIAGNOSIS
Physical Therapy Daily Treatment     Visit Count: 2  Plan of Care Dates: Initial: 8/29/2017 Through: 11/21/2017  Insurance Information: physical and speech therapy combined cap of $1980/$3700 per calendar year  Next Referring Provider Visit: none scheduled    Referred by: Trinidad Perez MD  Medical Diagnosis (from order): Low back pain with sciatica, sciatica laterality unspecified, unspecified back pain laterality, unspecified chronicity [M54.40]  - Primary   Insurance: 1. MEDICARE  2. [employer]  FOR LIFE    Date of Onset: End of March 2017   Diagnosis Precautions: none  Chart reviewed: Relevant co-morbidities, allergies, tests and medications: Diabetes    SUBJECTIVE   Patient reports her back has been bothering her a lot today. Wearing her back brace because it's helping. Her pain is the same as it was the other day.  Current Pain: not assessed/10.    Functional Change: See above    OBJECTIVE   Wearing soft back brace    Treatment   Therapeutic Exercise: All with ab brace  Standing hip abduction x 10 bilaterally, with yellow theraband x 10 bilaterally  Standing hip extension x 10 bilaterally, with yellow theraband x 10 bilaterally  Mini squat x 10    Neuromuscular Reeducation:  Hooklying ab brace with BP cuff set to 40mmHg, cues to keep needle no higher than 50mmHg 3 second hold x 15  Hooklying ab brace with BP cuff set to 40mmHg with march x 10 bilaterally    Manual Therapy:   Trigger point to bilateral piriformis in prone  MET to correct anterior rotation of right innominate (right glutes/left hip flexors) followed by shot gun technique.       Current Home Program (not performed this date except as noted above):   9/1/17 - hooklying ab brace 3-5 second hold x 10, hooklying ab brace with march x 10 bilaterally    ASSESSMENT   Patient presented today with anterior rotation of right innominate, as she did on the day of her evaluation. Able to correct with MET followed by shot gun technique. She demonstrates a  fair understanding of ab bracing, but requires cues to reduce the intensity of the brace.    Pain after treatment: \"better\"/10  Result of above outlined education: Verbalizes understanding and Needs reinforcement    Goals:       1. Patient independent with modified and progressed home exercise program.  2. Oswestry: Patient will complete form to reflect an improved score from initial score of 20 to less than or equal to 5% (0-20% = minimal disability; 20-40% = moderate disability; 40-60% = severe disability; 60-80% = crippled; % = bed bound) to indicate pt reported improvement in function/disability/impairment (minimal detectable change: 12%).   3. Patient will have increased core strength to 4/5 to be able to go through normal ADLs function all day without need for use of back brace by discharge.   4. Patient will have increased glute max strength to 4+/5 in order to demonstrate proper lifting mechanics for reduction of pain when bending to pick grocery bags up by discharge.  5. Patient will have reduction of pain by 50% in order to sleep throughout night without waking by discharge.  6. Patient will have increase glute med strength to 4/5 in order to increased support to low back for ability to stand for 2 hours with minimal pain by discharge.    PLAN   Assess response to session, continue core stability, hip strength, assess SI and correct as needed     THERAPY DAILY BILLING   Primary Insurance: MEDICARE  Secondary Insurance: [facility]  FOR LIFE    Evaluation Procedures:  No evaluation codes were used on this date of service    Timed Procedures:  Manual Therapy, 15 minutes  Neuromuscular Re-Education, 15 minutes  Therapeutic Exercise, 8 minutes    Untimed Procedures:  No untimed codes were used on this date of service    Total Treatment Time: 38 minutes    G-Code:  G-Code Score ABN form  reporting not required this treatment session  Modifier based on outcome measure(s)/functional testing/clinical  judgement as listed above    The referring provider's electronic or written signature on the evaluation authorizes the therapy plan of care and certifies the need for these services, furnished under this plan of care while under their care.  Electronically sent for physician signature      Seizure

## 2019-12-07 NOTE — ED ADULT NURSE NOTE - CHIEF COMPLAINT QUOTE
BIBA from home, unwitnessed seizure, found on the floor, postictal, hx of seizures, no blood thinners, A&O x 3. cleared by MD Coleman

## 2019-12-07 NOTE — ED ADULT NURSE REASSESSMENT NOTE - NS ED NURSE REASSESS COMMENT FT1
pt wants to go home , refused cardiac monitor , refused meds , refused vials signs , MD notified .MD at the bedside and explained to the pt the consequences of leaving AMA , pt still wants to go AMA , family aware , MD aware

## 2019-12-07 NOTE — ED PROVIDER NOTE - PHYSICAL EXAMINATION
VITAL SIGNS: I have reviewed nursing notes and confirm.  CONSTITUTIONAL: Well-developed; well-nourished; in no acute distress. pt comfortable.  SKIN: skin exam is warm and dry, no acute rash.   HEAD: Normocephalic; atraumatic.  EYES:  EOM intact; conjunctiva and sclera clear. PERRLA  ENT: No nasal discharge; airway clear. moist oral mucosa; uvula at midline. no pharyngeal erythema, edema exudate or vesicles.l.    NECK: Supple; non tender.  CARD: S1, S2 normal; no murmurs, gallops, or rubs. Regular rate and rhythm. posterior tibial and radial pulses 2+  RESP: No wheezes, rales or rhonchi. cta b/l. no use of accessory muscles. no retractions  ABD: Normal bowel sounds; soft; non-distended; non-tender; no rebound. negative psoas, rovsign's and murphys.  EXT: Normal ROM. No  cyanosis or edema.  BACK: No cva tenderness  LYMPH: No acute cervical adenopathy.  NEURO: Alert, oriented, grossly unremarkable.  CN 2-12 intact. normal gait. normal romberg's.  sensory grossly intact to face, upper and lower extremity.  5/5 strength to , extension and flexion at elbow, flexion at hip, extension and flexion at knees. finger to nose b/l.  PSYCH: Cooperative, appropriate.

## 2019-12-07 NOTE — ED ADULT NURSE NOTE - OBJECTIVE STATEMENT
Pt presents c/o unwitnessed seizures , found on the floor , no LOC ,no injury , AO x 4 , denies headache , denies dizziness , denies blurry vision , HX seizures , on dilantin at home , pt denies chest pain , denies abdominal pain , denies nausea , no vomiting noted , denies numbness , denies gen weakness , denies tingling sensation , no seizure episode this time , on seizure precautions

## 2019-12-07 NOTE — ED PROVIDER NOTE - CARE PROVIDER_API CALL
Johnson Bhatt)  Neurology  [address] [place] [address], [address]  [place] [address]  Phone: [phone]  Fax: [phone]  Follow Up Time:

## 2019-12-07 NOTE — CONSULT NOTE ADULT - SUBJECTIVE AND OBJECTIVE BOX
HPI: This is a 65 yo M with history of epilepsy who presents today after a seizure reported by his son. He was here yesterday for a witnessed seizure and transfer was recommended to [facility] for admission to the EMU for seizure characterization, but he did not want to stay and went home. His son was going to make something to eat tonight and found his father on the floor 'shaking' but couldn't remember which side was shaking. He does state that it was not the whole body, "like his usual seizures". The onset was not witnessed. The patient is amenable to admission at this time.    PMH:  1.	Epilepsy (on VPA, Dilantin, Phenobarb)    PSH:  1.	Denied    Family Hx:    Social Hx:    Allergies: NKDA    Medications: VPA, Dilantin, Phenobarbital    ROS: Seizure    Neurologic Examination:  Mentation: Awake, alert, oriented to person, place, and time. Speech is clear and fluent. Follows complex, multiple part commands. No neglect.  Cranial Nerves:  	II – Visual fields full.  	III/IV/VI – Pupils 3 mm. PERRL. EOMI.  	V – Grossly intact sensation.  	VII – No facial palsy.  	VIII – No nystagmus.  	IX/X – Symmetric palate rise. Uvula midline.  	XI – SCM strong b/l.  	XII – Tongue protrusion midline.  Motor: 5/5 strength b/l. Normal bulk and tone.  Sensory: Grossly intact.  Reflexes: 2+ generally.  Cerebellum: No dysmetria. Gait deferred.

## 2019-12-07 NOTE — ED PROVIDER NOTE - OBJECTIVE STATEMENT
Cont statin   63y/o M w/ hx of seizures on phenytoin, dpakote, dispraoex, [place]b [place], htn presents for unwitness seizure-- son found pt around 7pm; last time he had seen him was 30 mins before found.  no cp, sob, no cough, congestion, runny nose, no vomiting or diarrhea. no fevers or chills. no changes in medications.  last time pt was here for sizures; pt AMA.

## 2019-12-07 NOTE — ED ADULT TRIAGE NOTE - CHIEF COMPLAINT QUOTE
BIBA from home, unwitnessed seizure, found on the floor, postictal, hx of seizures, no blood thinners, A&O x 3. BIBA from home, unwitnessed seizure, found on the floor, postictal, hx of seizures, no blood thinners, A&O x 3. cleared by MD Coleman

## 2019-12-09 ENCOUNTER — INPATIENT (INPATIENT)
Facility: HOSPITAL | Age: 64
LOS: 1 days | Discharge: HOME HEALTH | End: 2019-12-11
Attending: HOSPITALIST | Admitting: HOSPITALIST
Payer: MEDICARE

## 2019-12-09 VITALS
RESPIRATION RATE: 17 BRPM | DIASTOLIC BLOOD PRESSURE: 54 MMHG | OXYGEN SATURATION: 99 % | TEMPERATURE: 100 F | SYSTOLIC BLOOD PRESSURE: 93 MMHG | HEART RATE: 83 BPM | WEIGHT: 179.9 LBS

## 2019-12-09 DIAGNOSIS — R56.9 UNSPECIFIED CONVULSIONS: ICD-10-CM

## 2019-12-09 LAB
ALBUMIN SERPL ELPH-MCNC: 4.1 G/DL — SIGNIFICANT CHANGE UP (ref 3.5–5.2)
ALP SERPL-CCNC: 56 U/L — SIGNIFICANT CHANGE UP (ref 30–115)
ALT FLD-CCNC: 19 U/L — SIGNIFICANT CHANGE UP (ref 0–41)
ANION GAP SERPL CALC-SCNC: 10 MMOL/L — SIGNIFICANT CHANGE UP (ref 7–14)
AST SERPL-CCNC: 22 U/L — SIGNIFICANT CHANGE UP (ref 0–41)
BASOPHILS # BLD AUTO: 0.06 K/UL — SIGNIFICANT CHANGE UP (ref 0–0.2)
BASOPHILS NFR BLD AUTO: 0.7 % — SIGNIFICANT CHANGE UP (ref 0–1)
BILIRUB SERPL-MCNC: 0.2 MG/DL — SIGNIFICANT CHANGE UP (ref 0.2–1.2)
BUN SERPL-MCNC: 20 MG/DL — SIGNIFICANT CHANGE UP (ref 10–20)
CALCIUM SERPL-MCNC: 8.6 MG/DL — SIGNIFICANT CHANGE UP (ref 8.5–10.1)
CHLORIDE SERPL-SCNC: 102 MMOL/L — SIGNIFICANT CHANGE UP (ref 98–110)
CK SERPL-CCNC: 57 U/L — SIGNIFICANT CHANGE UP (ref 0–225)
CO2 SERPL-SCNC: 26 MMOL/L — SIGNIFICANT CHANGE UP (ref 17–32)
CREAT SERPL-MCNC: 1.1 MG/DL — SIGNIFICANT CHANGE UP (ref 0.7–1.5)
EOSINOPHIL # BLD AUTO: 0.43 K/UL — SIGNIFICANT CHANGE UP (ref 0–0.7)
EOSINOPHIL NFR BLD AUTO: 4.9 % — SIGNIFICANT CHANGE UP (ref 0–8)
GLUCOSE SERPL-MCNC: 91 MG/DL — SIGNIFICANT CHANGE UP (ref 70–99)
HCT VFR BLD CALC: 39.7 % — LOW (ref 42–52)
HGB BLD-MCNC: 13.2 G/DL — LOW (ref 14–18)
IMM GRANULOCYTES NFR BLD AUTO: 0.8 % — HIGH (ref 0.1–0.3)
LYMPHOCYTES # BLD AUTO: 1.53 K/UL — SIGNIFICANT CHANGE UP (ref 1.2–3.4)
LYMPHOCYTES # BLD AUTO: 17.5 % — LOW (ref 20.5–51.1)
MAGNESIUM SERPL-MCNC: 2 MG/DL — SIGNIFICANT CHANGE UP (ref 1.8–2.4)
MCHC RBC-ENTMCNC: 31.1 PG — HIGH (ref 27–31)
MCHC RBC-ENTMCNC: 33.2 G/DL — SIGNIFICANT CHANGE UP (ref 32–37)
MCV RBC AUTO: 93.4 FL — SIGNIFICANT CHANGE UP (ref 80–94)
MONOCYTES # BLD AUTO: 0.79 K/UL — HIGH (ref 0.1–0.6)
MONOCYTES NFR BLD AUTO: 9 % — SIGNIFICANT CHANGE UP (ref 1.7–9.3)
NEUTROPHILS # BLD AUTO: 5.86 K/UL — SIGNIFICANT CHANGE UP (ref 1.4–6.5)
NEUTROPHILS NFR BLD AUTO: 67.1 % — SIGNIFICANT CHANGE UP (ref 42.2–75.2)
NRBC # BLD: 0 /100 WBCS — SIGNIFICANT CHANGE UP (ref 0–0)
PHENOBARB SERPL-MCNC: 29.8 UG/ML — SIGNIFICANT CHANGE UP (ref 15–40)
PHENOBARB SERPL-MCNC: 33.1 UG/ML — SIGNIFICANT CHANGE UP (ref 15–40)
PHENYTOIN FREE SERPL-MCNC: 7.6 UG/ML — LOW (ref 10–20)
PHENYTOIN FREE SERPL-MCNC: 8 UG/ML — LOW (ref 10–20)
PLATELET # BLD AUTO: 129 K/UL — LOW (ref 130–400)
POTASSIUM SERPL-MCNC: 4.4 MMOL/L — SIGNIFICANT CHANGE UP (ref 3.5–5)
POTASSIUM SERPL-SCNC: 4.4 MMOL/L — SIGNIFICANT CHANGE UP (ref 3.5–5)
PROT SERPL-MCNC: 6.4 G/DL — SIGNIFICANT CHANGE UP (ref 6–8)
RBC # BLD: 4.25 M/UL — LOW (ref 4.7–6.1)
RBC # FLD: 14.2 % — SIGNIFICANT CHANGE UP (ref 11.5–14.5)
SODIUM SERPL-SCNC: 138 MMOL/L — SIGNIFICANT CHANGE UP (ref 135–146)
VALPROATE SERPL-MCNC: 44 UG/ML — LOW (ref 50–100)
VALPROATE SERPL-MCNC: 86 UG/ML — SIGNIFICANT CHANGE UP (ref 50–100)
WBC # BLD: 8.74 K/UL — SIGNIFICANT CHANGE UP (ref 4.8–10.8)
WBC # FLD AUTO: 8.74 K/UL — SIGNIFICANT CHANGE UP (ref 4.8–10.8)

## 2019-12-09 PROCEDURE — 99223 1ST HOSP IP/OBS HIGH 75: CPT | Mod: AI

## 2019-12-09 PROCEDURE — 99221 1ST HOSP IP/OBS SF/LOW 40: CPT

## 2019-12-09 PROCEDURE — 99285 EMERGENCY DEPT VISIT HI MDM: CPT

## 2019-12-09 RX ORDER — PHENOBARBITAL 60 MG
32.4 TABLET ORAL AT BEDTIME
Refills: 0 | Status: DISCONTINUED | OUTPATIENT
Start: 2019-12-09 | End: 2019-12-10

## 2019-12-09 RX ORDER — DIVALPROEX SODIUM 500 MG/1
250 TABLET, DELAYED RELEASE ORAL DAILY
Refills: 0 | Status: DISCONTINUED | OUTPATIENT
Start: 2019-12-09 | End: 2019-12-10

## 2019-12-09 RX ORDER — PHENOBARBITAL 60 MG
0 TABLET ORAL
Qty: 0 | Refills: 0 | DISCHARGE

## 2019-12-09 RX ORDER — DIVALPROEX SODIUM 500 MG/1
250 TABLET, DELAYED RELEASE ORAL
Refills: 0 | Status: DISCONTINUED | OUTPATIENT
Start: 2019-12-09 | End: 2019-12-10

## 2019-12-09 RX ADMIN — Medication 300 MILLIGRAM(S): at 21:09

## 2019-12-09 RX ADMIN — DIVALPROEX SODIUM 250 MILLIGRAM(S): 500 TABLET, DELAYED RELEASE ORAL at 21:08

## 2019-12-09 RX ADMIN — Medication 32.4 MILLIGRAM(S): at 21:08

## 2019-12-09 NOTE — ED PROVIDER NOTE - ATTENDING CONTRIBUTION TO CARE
pt has a hx of seizure disordere on 3 meds with multiple breakthough seizures.  sx may be due to variable compliance to meds.  pt recently suffered the death of his spouse and has been despondent as per family.  VS noted.  neuro intact.  denies suicidal ideation.  psych called to see pt on a non emergent basis. pt has a hx of seizure disorder on 3 meds with multiple breakthrough seizures.  sx may be due to variable compliance to meds.  pt recently suffered the death of his spouse and has been despondent as per family.  VS noted.  neuro intact.  denies suicidal ideation.  psych called to see pt on a non emergent basis.

## 2019-12-09 NOTE — BEHAVIORAL HEALTH ASSESSMENT NOTE - SUMMARY
64 Y , , domiciled temporarily with sister, male, with unknown neurological and psychiatric history, brought in to the hospital for seizures. He demanded to leave AMA, and his sister reported that he expresses SI at home, so psychiatry consult was placed. Patient shows signs of severe neurcognitive dysfunction, as evidenced by his poor memory and inability to care for himself. It is unclear if this is chronic or acute as his only family is his sister, who met him one year ago. He requires a full neurological workup    plan  1. continue 1:1  2. recommend full neurological workup  3. recommend  evaluation- patient's sister is unable to take care of him, he might need to be in a supervised setting  4. if patient becomes agitated, recommend prn thorazine 25-50mg if verbal redirection does not work and he is a danger to self or others. Please note, that both phenobarb and thorazine cause sedation, and this should be monitored closely. 64 Y , , domiciled temporarily with sister, male, with unknown neurological and psychiatric history, brought in to the hospital for seizures. He demanded to leave AMA, and his sister reported that he expresses SI at home, so psychiatry consult was placed. Patient shows signs of severe neurcognitive dysfunction, as evidenced by his poor memory and inability to care for himself. It is unclear if this is chronic or acute as his only family is his sister, who met him one year ago. He requires a full neurological workup    plan  1. continue 1:1  2. recommend full neurological workup  3. recommend  evaluation- patient's sister is unable to take care of him, he might need to be in a supervised setting  4. if patient becomes agitated, recommend prn zyprexa 2.5mg if verbal redirection does not work and he is a danger to self or others. Please note, that zyprexa interacts with his phenytoin and phenobarb, and can lower seizure threshold, however out of the antipsychotics available, it carries less risk than the other options. only use if emergency, and DO NOT combine with ativan as this is contraindicated, can cause respiratory depression and death 64 Y , , domiciled temporarily with sister, male, with unknown neurological and psychiatric history, brought in to the hospital for seizures. He demanded to leave AMA, and his sister reported that he expresses SI at home, so psychiatry consult was placed. Patient shows signs of severe neurcognitive dysfunction, as evidenced by his poor memory and inability to care for himself. It is unclear if this is chronic or acute as his only family is his sister, who met him one year ago. He requires a full neurological workup    plan  1. continue 1:1  2. recommend full neurological workup  3. recommend  evaluation- patient's sister is unable to take care of him, he might need to be in a supervised setting  4. if patient becomes agitated, recommend prn zyprexa 2.5mg if verbal redirection does not work and he is a danger to self or others. Please note, that zyprexa interacts with his phenytoin and phenobarb, and can lower seizure threshold, however out of the antipsychotics available, it carries less risk than the other options. only use if emergency, and DO NOT combine with ativan as this is contraindicated, can cause respiratory depression and death  5. patient cannot leave AMA

## 2019-12-09 NOTE — ED PROVIDER NOTE - NS ED ROS FT
Constitutional: (-) fever, (-) chills  Eyes: (-) visual changes  ENT: (-) nasal congestions  Cardiovascular: (-) chest pain, (-) syncope  Respiratory: (-) cough, (-) shortness of breath, (-) dyspnea,   Gastrointestinal: (-) vomiting, (-) diarrhea, (-)nausea,  Musculoskeletal: (-) neck pain, (-) back pain, (-) joint pain,  Integumentary: (-) rash, (-) edema, (-) bruises  Neurological: (-) headache, (-) loc, (-) dizziness, (-) tingling, (-)numbness,  Psychiatric: (-) hallucinations, (-)nervousness, (-)depression, (-)SI/HI  Peripheral Vascular: (-) leg swelling  :  (-)dysuria,  (-) hematuria  Allergic/Immunologic: (-) pruritus

## 2019-12-09 NOTE — BEHAVIORAL HEALTH ASSESSMENT NOTE - ACTIVATING EVENTS/STRESSORS
Current or pending social isolation/Triggering events leading to humiliation, shame, and/or despair (e.g. Loss of relationship, financial or health status) (real or anticipated)

## 2019-12-09 NOTE — BEHAVIORAL HEALTH ASSESSMENT NOTE - CASE SUMMARY
64 Y , , domiciled temporarily with sister, male, with unknown neurological and psychiatric history, brought in to the hospital for seizures. He demanded to leave AMA, and his sister reported that he expresses SI at home, so psychiatry consult was placed. Patient shows signs of severe neurcognitive dysfunction, as evidenced by his poor memory and inability to care for himself. It is unclear if this is chronic or acute as his only family is his sister, who met him one year ago. He requires a full neurological workup  When I interviwed pt before the resident, pt. denied any SIIP.HIIP. He seems to minimize all the rsik factors.

## 2019-12-09 NOTE — BEHAVIORAL HEALTH ASSESSMENT NOTE - NSBHCHARTREVIEWVS_PSY_A_CORE FT
Vital Signs Last 24 Hrs  T(C): 36.3 (09 Dec 2019 14:26), Max: 37.5 (09 Dec 2019 08:55)  T(F): 97.3 (09 Dec 2019 14:26), Max: 99.5 (09 Dec 2019 08:55)  HR: 71 (09 Dec 2019 14:26) (71 - 83)  BP: 96/55 (09 Dec 2019 14:26) (93/54 - 98/64)  BP(mean): --  RR: 16 (09 Dec 2019 14:26) (16 - 18)  SpO2: 100% (09 Dec 2019 11:42) (99% - 100%)

## 2019-12-09 NOTE — ED PROVIDER NOTE - OBJECTIVE STATEMENT
63 yo male, pmh of seizure do on depakote, phenobarbital, phenytoin, presents to ed for admission for emu. Pt was admitted two days ago, had seizure at that time, left ama. pt here with sister. sister states pt lost wife 3 months ago and since has not been taking care of himeself. Pt without complaints at this time and states no seizure since two days ago. Denies fever, chills, cp, sob, le swelling,ha, numbmess, tingling, dizziness, visual changes, si/hi.

## 2019-12-09 NOTE — CONSULT NOTE ADULT - ATTENDING COMMENTS
Agree with the history and plan.  The history of the events , natural course , risk factors and even the treatment plan is not clear.    The prior CT is suggestive of the presence of left hemispheric hygroma ,which raises the possibility of trauma /SDH as the cause??  will start the monitoring  do trough levels  seizure precaution

## 2019-12-09 NOTE — BEHAVIORAL HEALTH ASSESSMENT NOTE - HPI (INCLUDE ILLNESS QUALITY, SEVERITY, DURATION, TIMING, CONTEXT, MODIFYING FACTORS, ASSOCIATED SIGNS AND SYMPTOMS)
64 Y , , domiciled temporarily with sister, male, with unknown neurological and psychiatric history, brought in to the hospital for seizures. He demanded to leave AMA, at which point his sister said that he endorses suicidal thoughts, and psychiatry consult was called. Sister was contacted to clarify her concerns. She says that she and her brother were raised separately, and she found him a year ago. Since finding him, she noticed that he is often not oriented to the date, year or president. 3 months ago, his wife passed away from a heart attack. His sister came to visit him, without knowing what happened, and was told by the neighbors that Lainey's wife passed away, and he has been living [address] since then because he has not paid rent. Lainey told her that he did not have access to his wife's bank account. His sister took him in to live with her. She finds that he is often disoriented, and not caring for himself. He will make statements that he wants to [facility] but has not made any attempts to her knowledge. She has a home health aide that helps him with ADLS, for the seizure disorder, and has been trying to get him appointments with neurology and psychiatry to figure out why he has such significant memory lapses.     On assessment, patient is laying calmly in his bed. He says 'I changed my mind, Ill stay". He is noted to frequently contradict himself during the assessment. He states that his wife passed away 30 years ago, and since then he has been living with his mother (sister says mother abandoned them when they were kids). He states that he lives in his old apartment that he shared with his wife, and later says that he was kicked out of said apartment. He cannot clarify why he did not have access to his wife's bank accounts. he denies depression, denies endorsing SI, denies HI. He admits to occasionally hearing female voices that say "hello and goodbye", however no command AH. He denies VH and PI. When asked about prior psychiatric treatment (inpatient and outpatient) he says "yes for seizures". It was explained to him multiple times that psychiatrists treat mental illness not seizures, however he does not seem to comprehend this. For now he agrees to remain in the hospital for a workup.

## 2019-12-09 NOTE — ED PROVIDER NOTE - PHYSICAL EXAMINATION
Physical Exam    Vital Signs: I have reviewed the initial vital signs.  Constitutional: well-nourished, appears stated age, no acute distress  Eyes: Conjunctiva pink, Sclera clear, PERRLA, EOMI.  ENT: OP is clear without exudates, normal dentition, normal gingival, tongue without swelling, TMs clear b/l, nasal turbinates without erythema or discharge, no lymphadenopathy.  Cardiovascular: S1 and S2, regular rate, regular rhythm, well-perfused extremities, radial pulses equal and 2+  Respiratory: unlabored respiratory effort, clear to auscultation bilaterally no wheezing, rales and rhonchi  Gastrointestinal: soft, non-tender abdomen, no pulsatile mass, normal bowl sounds  Musculoskeletal: supple neck, no lower extremity edema, no midline tenderness  Integumentary: warm, dry, no rash  Neurologic: awake, alert, cranial nerves II-XII grossly intact, extremities’ motor and sensory functions grossly intact  Psychiatric: appropriate mood, flat affect

## 2019-12-09 NOTE — ED ADULT NURSE NOTE - NSIMPLEMENTINTERV_GEN_ALL_ED
Implemented All Fall with Harm Risk Interventions:  [place] to call system. Call bell, personal items and telephone within reach. Instruct patient to call for assistance. Room bathroom lighting operational. Non-slip footwear when patient is off stretcher. Physically safe environment: no spills, clutter or unnecessary equipment. Stretcher in lowest position, wheels locked, appropriate side rails in place. Provide visual cue, wrist band, yellow gown, etc. Monitor gait and stability. Monitor for mental status changes and reorient to person, place, and time. Review medications for side effects contributing to fall risk. Reinforce activity limits and safety measures with patient and family. Provide visual clues: red socks.

## 2019-12-09 NOTE — H&P ADULT - HISTORY OF PRESENT ILLNESS
63yo male poor historian with h/o seizures since 21yo presents for VEEG. Pt admits to 2 recent seizures in the last 2 weeks. Pt states he has LOC and shaking which lasts about 5 minutes. Denies any incontinence, post ictal lethargy. Pt admits he is compliant with meds. Last seizure 2 years ago prior to this last 2 weeks.    Pt states his meds were changed (unsure how) when he came to ER 2 weeks ago. 65yo male poor historian with h/o seizures since 19yo presents for VEEG. Pt admits to 2 recent seizures in the last 2 weeks. Pt states he has LOC and shaking which lasts about 5 minutes. Denies any incontinence, post ictal lethargy. Pt admits he is compliant with meds. Last seizure 2 years ago prior to this last 2 weeks.    Pt states his meds were changed (unsure how) when he came to ER 2 weeks ago    As per sister patient has been depressed since wife death.  SI at time, thinking of jumping off bridge  refuses to answers questions at time.

## 2019-12-09 NOTE — ED ADULT NURSE NOTE - BREATHING, MLM
"  Physical Therapy Daily Treatment Note     Name: Fernanda Jordan [place]  Clinic Number: [id number]    Therapy Diagnosis:   Encounter Diagnoses   Name Primary?    Chronic heel pain, right     Impaired gait and mobility     Muscle tightness      Physician: Alber Weller DPM    Visit Date: 1/24/2019    Physician Orders: PT eval and treat  Medical Diagnosis:   E11.9 (ICD-10-CM) - Type 2 diabetes mellitus without complication, without long-term current use of insulin   M76.61 (ICD-10-CM) - Tendonitis, Achilles, right   M77.51 (ICD-10-CM) - Retrocalcaneal bursitis (back of heel), right     Evaluation Date: 12/11/18  Authorization Period Expiration: 12/31/19  Plan of Care Certification Period: 12/11/18 to 2/11/19  Visit #/Visits authorized: 11/50  FOTO: at d/c    Time In: 1605  Time Out: 1705  Total Billable Time: 45 minutes (TE-3)    Precautions:HTN; type 2 DM; hx of cerebellar stroke and TIA    Subjective     Pt reports: "nagging ache".  She was compliant with home exercise program.  Response to previous treatment: no adverse reaction.  Functional change: none    Pain: 3/10  Location: R heel/achilles      Objective     Fernanda received therapeutic exercises to develop strength and ROM for 45 minutes including:    Bike:      5' supervised    4-way ankle:   GTB 2x15 3 way, Inv 3x10  R  Seated heel/toe raises:  3x10 DL ea; pain during heel raises  Toe yoga/reverse yoga: 2x10 each R w/stabilization   Towel scrunches:  2' R  BAPS (in Standing)  L2 6-way x20 R     Standing HS stretch:   3x30" R   Gastroc str @ fitter:   5x30" DL beginning and end of session  Soleus str @ fitter:   5x30" DL beginning and end of session  Standing heel raises:  2x10 at mat  Standing toe raises:   3x10 DL at mat  Step ups   Stairs 2x10 R  Step downs   L1 1x10 on R - L tap  Leg press    Next?    Cold pack to R foot/ankle with elevation x 10 minutes.    Home Exercises Provided and Patient Education Provided     Education provided:   - " Continue updated HEP; encouraged to refrain from walking program for now    Written Home Exercises Provided: Not today.  Exercises were reviewed and Fernanda was able to demonstrate them prior to the end of the session.  Fernanda demonstrated good  understanding of the education provided.     See EMR under Patient Instructions for exercises provided 1/8/2019.    Assessment     Patient demonstrated good technique with therex as performed.  Required stabilization to complete toe yoga and reverse toe yoga today.  No complaints of pain with standing heel raises.  Able to increase activity/intensity as noted with standing BAPS and added step ups/downs as noted.. Reported decreased pain to 2/10 at the end of the treatment session    Fernanda is progressing well towards her goals.     Pt prognosis is Excellent.   Pt will continue to benefit from skilled outpatient physical therapy to address the deficits listed in the problem list box on initial evaluation, provide pt/family education and to maximize pt's level of independence in the home and community environment.     Pt's spiritual, cultural and educational needs considered and pt agreeable to plan of care and goals.  Anticipated barriers to physical therapy: none    Goals:   Short Term Goals (3 Weeks):   1. Pt will be independent with HEP to supplement PT in improving functional mobility.  2. Pt will improve 9090 HS flexibility to minimal impairment to decrease mechanical stresses on R heel with WB tasks.  3. Pt will improve R DF AROM with knee extended to 5 deg to improve gait mechanics.  4. Pt will walk 1/2 mile with pain </=3/10 to promote return to walking program.   Long Term Goals (6-8 Weeks):   1. Pt will improve FOTO score to </= 34% limited to decrease perceived limitation with mobility  2. Pt will improve 9090 HS flexibility to no impairment to decreased mechanical stresses on R heel with WB tasks.  3. Pt will improve B DF AROM with knee extended to 10 deg to  improve gait mechanics.  4. Pt will walk 1/2 mile with no pain to promote return to walking program  5. NEW Pt will perform >/=20 full heel raises without pain to promote gastroc/soleus function free from Achilles irritation.    Plan     Cont POC, progress as able.    Lois Arteaga, PTA    Spontaneous, unlabored and symmetrical

## 2019-12-09 NOTE — ED PROVIDER NOTE - CHPI ED SYMPTOMS NEG
no dizziness/no pain/no fever/no decreased eating/drinking/no vomiting/no chills/no nausea/no numbness/no weakness

## 2019-12-09 NOTE — H&P ADULT - ASSESSMENT
65 yo male with h/o seizures since 21yo presents after 2 seizures in the last 2 weeks and presents for VEEG.          D/W Dr Lewis 65 yo male with h/o seizures since 21yo presents after 2 seizures in the last 2 weeks and presents for VEEG.      1. Seizure  seizure precausion  Continue with home medicaitons  -VEEG  -Neurology to follow up     2. AMS  -patient requesting to sign AMA.  As per Sister , patient wants to hurt himself, told her that he is going to jump in front of moving bus after being discharged from the hospital  -deemed to be unable to make decision as per psych  -cannot leave AMA  -head CT scan negative  -Will obtain TSH, B12, folate, RPR level, and Neurology consult  -Continue with neuro check  -Please follow up psych recommendation regarding medications for agitation  -1:1 sitter

## 2019-12-09 NOTE — CONSULT NOTE ADULT - SUBJECTIVE AND OBJECTIVE BOX
Neurology/Epilepsy Consult:    GERALD WONG 64y Male  MRN-[id number]    Patient is a 64y old  Male who presents with a chief complaint of       HPI:  65yo male poor historian with h/o seizures since 21yo presents for VEEG. Pt admits to 2 recent seizures in the last 2 weeks. Pt states he has LOC and shaking which lasts about 5 minutes. Denies any incontinence, post ictal lethargy. Pt admits he is compliant with meds. Last seizure 2 years ago prior to this last 2 weeks.    Pt states his meds were changed (unsure how) when he came to ER 2 weeks ago. (09 Dec 2019 12:15)        PAST MEDICAL & SURGICAL HISTORY:  HTN (hypertension)  Seizure  No significant past surgical history        FAMILY HISTORY:        Social History: (-) x 3      Risk factors:  Born full-term, , no complications  Normal growth and development  No febrile seizures/CNS infections  No head trauma with loss of consciousness      Allergy:  No Known Allergies        Home Medications:  phenytoin 300 mg oral capsule, extended release:  (2019 22:06)        MEDICATIONS  (STANDING):  diVALproex  milliGRAM(s) Oral daily  diVALproex  milliGRAM(s) Oral <User Schedule>  phenytoin   Capsule 200 milliGRAM(s) Oral daily  phenytoin   Capsule 300 milliGRAM(s) Oral <User Schedule>    MEDICATIONS  (PRN):        Review of systems:    Constitutional: No fever, weight loss or fatigue    Eyes: No eye pain or discharge  ENMT:  No difficulty hearing; No sinus or throat pain  Neck: No pain or stiffness  Respiratory: No cough, wheezing, chills or hemoptysis  Cardiovascular: No chest pain, palpitations, shortness of breath, dyspnea on exertion  Gastrointestinal: No abdominal pain, nausea, vomiting or hematemesis; No diarrhea or constipation.   Genitourinary: No dysuria, frequency, hematuria or incontinence  Neurological: As per HPI  Skin: No rashes or lesions   Endocrine: No heat or cold intolerance; No hair loss  Musculoskeletal: No joint pain or swelling  Psychiatric: No depression, anxiety, mood swings  Heme/Lymph: No easy bruising or bleeding gums      T(F): 98.7 (19 @ 11:42), Max: 99.5 (19 @ 08:55)  HR: 75 (19 @ 11:42) (75 - 83)  BP: 98/64 (19 @ 11:42) (93/54 - 98/64)  RR: 18 (19 @ 11:42) (17 - 18)  SpO2: 100% (19 @ 11:42) (99% - 100%)      Neurologic Examination:  General:  Appearance is consistent with chronologic age.  No abnormal facies.   General: The patient is oriented to person, place, time and date.  Recent and remote memory intact.  Follows 4-step directions. Fund of knowledge is intact and normal.  Language with normal repetition, comprehension and naming.  Nondysarthric.    Cranial nerves: EOMI w/o nystagmus, skew or reported double vision.  PERRL.  No ptosis/weakness of eyelid closure.  Facial sensation is normal with normal bite.  No facial asymmetry.  Hearing grossly intact b/l.  Palate elevates midline.  Tongue midline.  Motor examination:   Normal tone, bulk and range of motion.  No tenderness, twitching, tremors or involuntary movements.  Formal Muscle Strength Testin/5 UE; 5/5 LE.  No observable drift.  Reflexes:   2+ b/l pectoralis, biceps, triceps, brachioradialis, patella and Achilles.  Plantar response downgoing b/l.  Jaw jerk, [place], clonus absent.  Sensory examination:   Intact to light touch and pinprick, pain, temperature and proprioception and vibration in all extremities.  Cerebellum:   FTN/HKS intact with normal BIJU in all limbs.  No dysmetria or dysdiadokinesia.  Gait narrow based and normal.      Labs:                         13.2   8.74  )-----------( 129      ( 09 Dec 2019 09:01 )             39.7         138  |  102  |  20  ----------------------------<  91  4.4   |  26  |  1.1    Ca    8.6      09 Dec 2019 09:01  Mg     2.0         TPro  6.4  /  Alb  4.1  /  TBili  0.2  /  DBili  x   /  AST  22  /  ALT  19  /  AlkPhos  56      LIVER FUNCTIONS - ( 09 Dec 2019 09:01 )  Alb: 4.1 g/dL / Pro: 6.4 g/dL / ALK PHOS: 56 U/L / ALT: 19 U/L / AST: 22 U/L / GGT: x                   Valproic Acid Level, Serum: 86.0 ug/mL [50.0 - 100.0] (19 @ 09:01)  Phenytoin Level, Serum: 8.0 ug/mL [10.0 - 20.0] (19 @ 09:01)  Phenobarbital Level, Serum: 33.1 ug/mL [15.0 - 40.0] (19 @ 09:01)  Phenytoin Level, Serum: 7.1 ug/mL [10.0 - 20.0] (19 @ 21:50)  Phenobarbital Level, Serum: 31.1 ug/mL [15.0 - 40.0] (19 @ 19:55)  Valproic Acid Level, Serum: 79.0 ug/mL [50.0 - 100.0] (19 @ 19:55)        Neuroimaging:  NCHCT: CT Head No Cont:   EXAM:  CT BRAIN            PROCEDURE DATE:  2019            INTERPRETATION:  CLINICAL HISTORY/REASON FOR EXAM: History of seizure.    TECHNIQUE: Contiguous axial CT images of the head were obtained from the   base of the skull to the vertex without IV contrast. Coronal and sagittal   reformats were obtained.    COMPARISON: CT head 2019.    FINDINGS:    The cortical sulci and ventricular system are appropriate for patient's   stated age.     No acute intracranial hemorrhage. No mass effect, midline shift, or   intracranial fluid collection.     Gray-white differentiation is maintained.    The calvarium is intact. There is no soft tissue swelling.     The visualized paranasal sinuses and mastoid air cells are well aerated.    IMPRESSION:  No evidence of acute intracranial pathology.    In view of the history of seizure, MRI of the brain is recommended for   further evaluation if clinically warranted and not contra-indicated in   this patient.                  FAITH ASKEW M.D., ATTENDING RADIOLOGIST  This document has been electronically signed. Dec  7 2019  8:10PM             (19 @ 20:05)    CT Angiography/Perfusion:   MRI Brain NC:   MRA Head/Neck:       EEG:       Assessment:  This is a 64y Male with h/o seizure disorder diagnosed at 21 yo brought to ED by sister for evaluation. Patient reportedly has multiple breakthrough events lasting up to 30 minutes.       Discussed with Dr. Casas      Plan:   - VEEG for better characterization and assessment  - Seizure precautions  - Ativan 2mg IV PRN for generalized tonic-clonic seizure lasting longer than 2 minutes, or two consecutive seizures without return to baseline in-between (ordered)  - CBC, CMP, Mg, CK, AED levels trough (ordered)  - Keep Mg above 2    Plan discussed with patient in details, all questions answered.    Discussed with hospitalist Dr. Lewis. Neurology/Epilepsy Consult:    GERALD WONG 64y Male  MRN-[id number]    Patient is a 64y old left-handed Male who was brought to ED for seizure evaluation      HPI: History obtained from patient and sister. EMR reviewed.  65yo male poor historian with h/o seizures since 19yo presents for VEEG. Pt admits to 2 recent seizures in the last 2 weeks. Pt states he has LOC and shaking which lasts about 5 minutes. Denies any incontinence, post ictal lethargy. Pt admits he is compliant with meds. Last seizure 2 years ago prior to this last 2 weeks.    Pt states his meds were changed (unsure how) when he came to ER 2 weeks ago. (09 Dec 2019 12:15)        PAST MEDICAL & SURGICAL HISTORY:  Epilepsy  HTN   DLD  No significant past surgical history        FAMILY HISTORY:  No seizures      Social History:   Lives with sister  Not employed      Risk factors:  Birth history is not available      Allergy:  No Known Allergies        Home Medications as per [facility] ([phone]) last filled in 2019:  Phenytoin 200mg in AM and 300mg in HS  Phenobarbital 32.4mg daily  Depakote 250mg daily  Lipitor 20mg daily        MEDICATIONS  (STANDING):  diVALproex  milliGRAM(s) Oral daily  diVALproex  milliGRAM(s) Oral <User Schedule>  phenytoin   Capsule 200 milliGRAM(s) Oral daily  phenytoin   Capsule 300 milliGRAM(s) Oral <User Schedule>    MEDICATIONS  (PRN):          T(F): 98.7 (19 @ 11:42), Max: 99.5 (19 @ 08:55)  HR: 75 (19 @ 11:42) (75 - 83)  BP: 98/64 (19 @ 11:42) (93/54 - 98/64)  RR: 18 (19 @ 11:42) (17 - 18)  SpO2: 100% (19 @ 11:42) (99% - 100%)      Neurologic Examination:  General:  Appearance is consistent with chronologic age.  No abnormal facies.   General: The patient is oriented to person, place, month and year.  Follows 3-step directions. Language is slow with normal repetition and naming.  Nondysarthric. Some questions need to be repeated or rephrased.  Cranial nerves: EOMI w/o nystagmus, skew or reported double vision.  PERRL.  No ptosis/weakness of eyelid closure.  Facial sensation is normal with normal bite.  No facial asymmetry.  Hearing grossly intact b/l.  Palate elevates midline.  Tongue midline.  Motor examination:   Normal tone, bulk and range of motion.  No tenderness, twitching, tremors or involuntary movements.  Formal Muscle Strength Testin/5 UE; 5/5 LE.  No observable drift.  Reflexes:   2+ b/l   Sensory examination:   Intact to light touch and pinprick, pain.  Cerebellum:   FTN/HKS intact with normal BIJU in all limbs.  No dysmetria or dysdiadokinesia.  Gait examination deferred.      Labs:                         13.2   8.74  )-----------( 129      ( 09 Dec 2019 09:01 )             39.7         138  |  102  |  20  ----------------------------<  91  4.4   |  26  |  1.1    Ca    8.6      09 Dec 2019 09:01  Mg     2.0         TPro  6.4  /  Alb  4.1  /  TBili  0.2  /  DBili  x   /  AST  22  /  ALT  19  /  AlkPhos  56      LIVER FUNCTIONS - ( 09 Dec 2019 09:01 )  Alb: 4.1 g/dL / Pro: 6.4 g/dL / ALK PHOS: 56 U/L / ALT: 19 U/L / AST: 22 U/L / GGT: x                   Valproic Acid Level, Serum: 86.0 ug/mL [50.0 - 100.0] (19 @ 09:01)  Phenytoin Level, Serum: 8.0 ug/mL [10.0 - 20.0] (19 @ 09:01)  Phenobarbital Level, Serum: 33.1 ug/mL [15.0 - 40.0] (19 @ 09:01)  Phenytoin Level, Serum: 7.1 ug/mL [10.0 - 20.0] (19 @ 21:50)  Phenobarbital Level, Serum: 31.1 ug/mL [15.0 - 40.0] (19 @ 19:55)  Valproic Acid Level, Serum: 79.0 ug/mL [50.0 - 100.0] (19 @ 19:55)        Neuroimaging:  NCHCT: CT Head No Cont:   EXAM:  CT BRAIN            PROCEDURE DATE:  2019            INTERPRETATION:  CLINICAL HISTORY/REASON FOR EXAM: History of seizure.    TECHNIQUE: Contiguous axial CT images of the head were obtained from the   base of the skull to the vertex without IV contrast. Coronal and sagittal   reformats were obtained.    COMPARISON: CT head 2019.    FINDINGS:    The cortical sulci and ventricular system are appropriate for patient's   stated age.     No acute intracranial hemorrhage. No mass effect, midline shift, or   intracranial fluid collection.     Gray-white differentiation is maintained.    The calvarium is intact. There is no soft tissue swelling.     The visualized paranasal sinuses and mastoid air cells are well aerated.    IMPRESSION:  No evidence of acute intracranial pathology.    In view of the history of seizure, MRI of the brain is recommended for   further evaluation if clinically warranted and not contra-indicated in   this patient.                  FAITH ASKEW M.D., ATTENDING RADIOLOGIST  This document has been electronically signed. Dec  7 2019  8:10PM             (19 @ 20:05)    CT Angiography/Perfusion:   MRI Brain NC:   MRA Head/Neck:       EEG:       Assessment:  This is a 64y Male with h/o seizure disorder diagnosed at 19 yo brought to ED by sister for evaluation. Patient reportedly has multiple breakthrough events lasting up to 30 minutes.       Discussed with Dr. Casas      Plan:   - VEEG for better characterization and assessment  - Seizure precautions  - Ativan 2mg IV PRN for generalized tonic-clonic seizure lasting longer than 2 minutes, or two consecutive seizures without return to baseline in-between (ordered)  - CBC, CMP, Mg, CK, AED levels trough (ordered)  - Keep Mg above 2    Plan discussed with patient in details, all questions answered.    Discussed with hospitalist Dr. Lewis. Neurology/Epilepsy Consult:    GERALD WONG 64y Male  MRN-[id number]    Patient is a 64y old left-handed Male who was brought to ED for seizure evaluation      HPI: History obtained from patient and sister Radha Thapa [phone]. EMR reviewed.   Patient was brought this morning to ED [facility] by sister for seizure evaluation. Patient reportedly had 2 seizures in the last 2 weeks (/ and ) when was found by relatives or HHA unresponsive with generalized body shaking lasting up to 10 minutes and followed by confusion. Patient was evaluated in ED [place] both times, was recommended admission for seizure evaluation and transfer to EMU, but he signed AMA.  Prior to that patient was in the ED several times in July and 2019 for breakthrough seizures. Patient was recommended admission 19, but left AMA.  Patient reports having h/o seizures since 19yo. He does not remember any of his seizures. Currently lives with sister and her family, prior to that lived with wife. Patient knows the names of the seizure medications he is taking, but does know the doses. He reports taking medications regularly, denies any missed doses. Patient initially agreed to have VEEG and signed consent, then stated that he wants to leave.   I contacted sister Radha who states that patient used to live in [place] with wife, but after she passed away he moved to  and currently lives with her family. Patient is taking medications regularly. He was previously followed by neurologist in [place] (does not know the name), recently was seen at [facility] clinic for the [facility] time.   When sister found out that patient does not want to stay in the hospital, she started screaming and informed me that she is not returning to the hospital at all, then hang up.         PAST MEDICAL & SURGICAL HISTORY:  Epilepsy  HTN   DLD  No significant past surgical history        FAMILY HISTORY:  No seizures      Social History:   Lives with sister  Not employed      Risk factors:  Birth history is not available      Allergy:  No Known Allergies        Home Medications as per [facility] ([phone]) last filled in 2019:  Phenytoin 200mg in AM and 300mg in HS  Phenobarbital 32.4mg daily  Depakote 250mg daily  Lipitor 20mg daily        MEDICATIONS  (STANDING):  diVALproex  milliGRAM(s) Oral daily  diVALproex  milliGRAM(s) Oral <User Schedule>  phenytoin   Capsule 200 milliGRAM(s) Oral daily  phenytoin   Capsule 300 milliGRAM(s) Oral <User Schedule>    MEDICATIONS  (PRN):          T(F): 98.7 (19 @ 11:42), Max: 99.5 (19 @ 08:55)  HR: 75 (19 @ 11:42) (75 - 83)  BP: 98/64 (19 @ 11:42) (93/54 - 98/64)  RR: 18 (19 @ 11:42) (17 - 18)  SpO2: 100% (19 @ 11:42) (99% - 100%)      Neurologic Examination:  General:  Appearance is consistent with chronologic age.  No abnormal facies.   General: The patient is oriented to person, place, month and year.  Follows 3-step directions. Language is slow with normal repetition and naming.  Nondysarthric. Some questions need to be repeated or rephrased.  Cranial nerves: EOMI w/o nystagmus, skew or reported double vision.  PERRL.  No ptosis/weakness of eyelid closure.  Facial sensation is normal with normal bite.  No facial asymmetry.  Hearing grossly intact b/l.  Palate elevates midline.  Tongue midline.  Motor examination:   Normal tone, bulk and range of motion.  No tenderness, twitching, tremors or involuntary movements.  Formal Muscle Strength Testin/5 UE; 5/5 LE.  No observable drift.  Reflexes:   2+ b/l   Sensory examination:   Intact to light touch and pinprick, pain.  Cerebellum:   FTN/HKS intact with normal BIJU in all limbs.  No dysmetria or dysdiadokinesia.  Gait examination deferred.      Labs:                         13.2   8.74  )-----------( 129      ( 09 Dec 2019 09:01 )             39.7         138  |  102  |  20  ----------------------------<  91  4.4   |  26  |  1.1    Ca    8.6      09 Dec 2019 09:01  Mg     2.0         TPro  6.4  /  Alb  4.1  /  TBili  0.2  /  DBili  x   /  AST  22  /  ALT  19  /  AlkPhos  56      LIVER FUNCTIONS - ( 09 Dec 2019 09:01 )  Alb: 4.1 g/dL / Pro: 6.4 g/dL / ALK PHOS: 56 U/L / ALT: 19 U/L / AST: 22 U/L / GGT: x                   Valproic Acid Level, Serum: 86.0 ug/mL [50.0 - 100.0] (19 @ 09:01)  Phenytoin Level, Serum: 8.0 ug/mL [10.0 - 20.0] (19 @ 09:01)  Phenobarbital Level, Serum: 33.1 ug/mL [15.0 - 40.0] (19 @ 09:01)  Phenytoin Level, Serum: 7.1 ug/mL [10.0 - 20.0] (19 @ 21:50)  Phenobarbital Level, Serum: 31.1 ug/mL [15.0 - 40.0] (19 @ 19:55)  Valproic Acid Level, Serum: 79.0 ug/mL [50.0 - 100.0] (19 @ 19:55)        Neuroimaging:  NCHCT: CT Head No Cont:   EXAM:  CT BRAIN            PROCEDURE DATE:  2019        INTERPRETATION:  CLINICAL HISTORY/REASON FOR EXAM: History of seizure.    TECHNIQUE: Contiguous axial CT images of the head were obtained from the   base of the skull to the vertex without IV contrast. Coronal and sagittal   reformats were obtained.    COMPARISON: CT head 2019.    FINDINGS:    The cortical sulci and ventricular system are appropriate for patient's   stated age.     No acute intracranial hemorrhage. No mass effect, midline shift, or   intracranial fluid collection.     Gray-white differentiation is maintained.    The calvarium is intact. There is no soft tissue swelling.     The visualized paranasal sinuses and mastoid air cells are well aerated.    IMPRESSION:  No evidence of acute intracranial pathology.    In view of the history of seizure, MRI of the brain is recommended for   further evaluation if clinically warranted and not contra-indicated in   this patient.    FAITH ASKEW M.D., ATTENDING RADIOLOGIST  This document has been electronically signed. Dec  7 2019  8:10PM      (19 @ 20:05)        Assessment:  This is a 64y Male with h/o DLD, seizure disorder diagnosed at 21 yo, brought to ED by sister for evaluation. Patient reportedly has increased seizure frequency on 3 AED. He was recently in ED twice for breakthrough seizures and was recommended admission, but both times left AMA.      Discussed with Dr. Casas      Plan:   - VEEG for better characterization and assessment  - Seizure precautions  - Ativan 2mg IV PRN for generalized tonic-clonic seizure lasting longer than 2 minutes, or two consecutive seizures without return to baseline in-between (ordered)  - CBC, CMP, Mg, CK, AED levels trough (ordered)  - Keep Mg above 2      Discussed with hospitalist Dr. Lewis who is currently at the bedside to evaluate patient. Neurology/Epilepsy Consult:    GERALD WONG 64y Male  MRN-[id number]    Patient is a 64y old left-handed Male who was brought to ED for seizure evaluation      HPI: History obtained from patient and sister Radha Thapa [phone]. EMR reviewed.   Patient was brought this morning to ED [facility] by sister for seizure evaluation. Patient reportedly had 2 seizures in the last 2 weeks (/ and ) when was found by relatives or HHA unresponsive with generalized body shaking lasting up to 30 minutes and followed by confusion. Patient was evaluated in ED [place] both times, was recommended admission and transfer to EMU for seizure medication adjustment, but he signed out AMA.  Prior to that patient was in the ED several times in July and 2019 for breakthrough seizures. Patient was recommended admission 19, but also left AMA.    Patient reports having h/o seizures since 19yo. He does not remember any of his seizures. Currently he  lives with sister and her family, prior to that lived with wife. Patient knows the names of seizure medications he is taking, but does know the doses. He reports taking medications regularly, denies any missed doses. States took all 3 of his seizure medications a7 am today. Patient initially agreed to have VEEG and signed consent, then changed his mind and stated that he wanted to leave the hospital  .   I contacted patient's sister Radha who states that patient used to live in [place] with wife, but she passed away and he moved to [place] 6 months ago. Patient is taking medications regularly, he has HHA during the day. He was previously followed by neurologist in [place] (does not know the name), recently was seen at [facility] neurology clinic for the first time.   When sister was informed that patient does not want to stay in the hospital, she started screaming and hang up.         PAST MEDICAL & SURGICAL HISTORY:  Epilepsy  HTN   DLD  No significant past surgical history        FAMILY HISTORY:  No seizures      Social History:   Lives with sister  Not employed      Risk factors:  Birth history is not available      Allergy:  No Known Allergies        Home Medications as per [facility] ([phone]) last filled in 2019:  Phenytoin 200mg in AM and 300mg in HS  Phenobarbital 32.4mg daily  Depakote 250mg daily  Lipitor 20mg daily        MEDICATIONS  (STANDING):  diVALproex  milliGRAM(s) Oral daily  diVALproex  milliGRAM(s) Oral <User Schedule>  phenytoin   Capsule 200 milliGRAM(s) Oral daily  phenytoin   Capsule 300 milliGRAM(s) Oral <User Schedule>    MEDICATIONS  (PRN):          T(F): 98.7 (19 @ 11:42), Max: 99.5 (19 @ 08:55)  HR: 75 (19 @ 11:42) (75 - 83)  BP: 98/64 (19 @ 11:42) (93/54 - 98/64)  RR: 18 (19 @ 11:42) (17 - 18)  SpO2: 100% (19 @ 11:42) (99% - 100%)      Neurologic Examination:  General:  Appearance is consistent with chronologic age.  No abnormal facies.   General: The patient is calm, oriented to person, place, month and year.  Follows 3-step directions. Language is slow with normal repetition and naming.  Nondysarthric. Some questions need to be repeated or rephrased.  Cranial nerves: EOMI w/o nystagmus, skew or reported double vision.  PERRL.  No ptosis/weakness of eyelid closure.  Facial sensation is normal with normal bite.  No facial asymmetry.  Hearing grossly intact b/l.  Palate elevates midline.  Tongue midline.  Motor examination:   Normal tone, bulk and range of motion.  No tenderness, twitching, tremors or involuntary movements.  Formal Muscle Strength Testin/5 UE; 5/5 LE.  No observable drift.  Reflexes:   2+ b/l   Sensory examination:   Intact to light touch and pinprick, pain.  Cerebellum:   FTN/HKS intact with normal BIJU in all limbs.  No dysmetria or dysdiadokinesia.  Gait examination deferred.      Labs:                         13.2   8.74  )-----------( 129      ( 09 Dec 2019 09:01 )             39.7         138  |  102  |  20  ----------------------------<  91  4.4   |  26  |  1.1    Ca    8.6      09 Dec 2019 09:01  Mg     2.0         TPro  6.4  /  Alb  4.1  /  TBili  0.2  /  DBili  x   /  AST  22  /  ALT  19  /  AlkPhos  56      LIVER FUNCTIONS - ( 09 Dec 2019 09:01 )  Alb: 4.1 g/dL / Pro: 6.4 g/dL / ALK PHOS: 56 U/L / ALT: 19 U/L / AST: 22 U/L / GGT: x               Valproic Acid Level, Serum: 86.0 ug/mL [50.0 - 100.0] (19 @ 09:01)  Phenytoin Level, Serum: 8.0 ug/mL [10.0 - 20.0] (19 @ 09:01)  Phenobarbital Level, Serum: 33.1 ug/mL [15.0 - 40.0] (19 @ 09:01)  Phenytoin Level, Serum: 7.1 ug/mL [10.0 - 20.0] (19 @ 21:50)  Phenobarbital Level, Serum: 31.1 ug/mL [15.0 - 40.0] (19 @ 19:55)  Valproic Acid Level, Serum: 79.0 ug/mL [50.0 - 100.0] (19 @ 19:55)        Neuroimaging:  NCHCT: CT Head No Cont:   EXAM:  CT BRAIN            PROCEDURE DATE:  2019        INTERPRETATION:  CLINICAL HISTORY/REASON FOR EXAM: History of seizure.    TECHNIQUE: Contiguous axial CT images of the head were obtained from the   base of the skull to the vertex without IV contrast. Coronal and sagittal   reformats were obtained.    COMPARISON: CT head 2019.    FINDINGS:    The cortical sulci and ventricular system are appropriate for patient's   stated age.     No acute intracranial hemorrhage. No mass effect, midline shift, or   intracranial fluid collection.     Gray-white differentiation is maintained.    The calvarium is intact. There is no soft tissue swelling.     The visualized paranasal sinuses and mastoid air cells are well aerated.    IMPRESSION:  No evidence of acute intracranial pathology.    In view of the history of seizure, MRI of the brain is recommended for   further evaluation if clinically warranted and not contra-indicated in   this patient.    FAITH ASKEW M.D., ATTENDING RADIOLOGIST  This document has been electronically signed. Dec  7 2019  8:10PM      (19 @ 20:05)        Assessment:  This is a 64y Male with h/o DLD, seizure disorder diagnosed at 19 yo, brought to ED by sister for evaluation. Patient reportedly has increased seizure frequency on 3 AEDs. He was in ED twice in the last 2 weeks for breakthrough seizures and was recommended admission, but both times left AMA. Patient had stat AED levels sent this morning in the ED, but they were not trough. Patient initially agreed to VEEG monitoring, but then changed his mind.      Discussed with Dr. Casas      Plan:   - VEEG for better characterization and treatment plan if patient agrees  - Seizure precautions  - Continue pre-admission doses of AED for now  - Ativan 2mg IV PRN for generalized tonic-clonic seizure lasting longer than 2 minutes, or two consecutive seizures without return to baseline in-between (ordered)  - CBC, CMP, Mg, CK, AED levels trough (ordered)  - Keep Mg above 2      Discussed with hospitalist Dr. Lewis who is currently at the bedside to evaluate patient. Neurology/Epilepsy Consult:    GERALD WONG 64y Male  MRN-[id number]    Patient is a 64y old left-handed Male who was brought to ED for seizure evaluation      HPI: History obtained from patient and sister Radha Thapa [phone]. EMR reviewed.   Patient was brought this morning to ED [facility] by sister for seizure evaluation. Patient reportedly had 2 seizures in the last 2 weeks (/ and ) when was found by relatives or HHA unresponsive with generalized body shaking lasting up to 30 minutes and followed by confusion. Patient was evaluated in ED [place] both times, was recommended admission and transfer to EMU for seizure medication adjustment, but he signed out AMA.  Prior to that patient was in the ED several times in July and 2019 for breakthrough seizures. Patient was recommended admission 19, but also left AMA.    Patient reports having h/o seizures since 19yo. He does not remember any of his seizures. Currently he  lives with sister and her family, prior to that lived with wife. Patient knows the names of seizure medications he is taking, but does know the doses. He reports taking medications regularly, denies any missed doses. States took all 3 of his seizure medications a7 am today. Patient initially agreed to have VEEG and signed consent, then changed his mind and stated that he wanted to leave the hospital  .   I contacted patient's sister Radha who states that patient used to live in [place] with wife, but she passed away and he moved to [place] 6 months ago. Patient is taking medications regularly, he has HHA during the day. He was previously followed by neurologist in [place] (does not know the name), recently was seen at [facility] neurology clinic for the first time.   When sister was informed that patient does not want to stay in the hospital, she started screaming and hang up.    Patient's pharmacy contacted to clarify AED doses (see below). Patient has a paperwork dated 2019 that has different doses of Depakote and Phenobarbital, and at this point it is not very clear how much exactly patient is taking.         PAST MEDICAL & SURGICAL HISTORY:  Epilepsy  HTN   DLD  No significant past surgical history        FAMILY HISTORY:  No seizures      Social History:   Lives with sister  Not employed      Risk factors:  Birth history is not available      Allergy:  No Known Allergies        Home Medications as per [facility] ([phone]) last filled in 2019:  Phenytoin 200mg in AM and 300mg in HS  Phenobarbital 32.4mg 1 tablet daily  Depakote 250mg 1 tablet daily  Lipitor 20mg daily        MEDICATIONS  (STANDING):  diVALproex  milliGRAM(s) Oral daily  diVALproex  milliGRAM(s) Oral <User Schedule>  phenytoin   Capsule 200 milliGRAM(s) Oral daily  phenytoin   Capsule 300 milliGRAM(s) Oral <User Schedule>    MEDICATIONS  (PRN):          T(F): 98.7 (19 @ 11:42), Max: 99.5 (19 @ 08:55)  HR: 75 (19 @ 11:42) (75 - 83)  BP: 98/64 (19 @ 11:42) (93/54 - 98/64)  RR: 18 (19 @ 11:42) (17 - 18)  SpO2: 100% (19 @ 11:42) (99% - 100%)      Neurologic Examination:  General:  Appearance is consistent with chronologic age.  No abnormal facies.   General: The patient is calm, oriented to person, place, month and year.  Follows 3-step directions. Language is slow with normal repetition and naming.  Nondysarthric. Some questions need to be repeated or rephrased.  Cranial nerves: EOMI w/o nystagmus, skew or reported double vision.  PERRL.  No ptosis/weakness of eyelid closure.  Facial sensation is normal with normal bite.  No facial asymmetry.  Hearing grossly intact b/l.  Palate elevates midline.  Tongue midline.  Motor examination:   Normal tone, bulk and range of motion.  No tenderness, twitching, tremors or involuntary movements.  Formal Muscle Strength Testin/5 UE; 5/5 LE.  No observable drift.  Reflexes:   2+ b/l   Sensory examination:   Intact to light touch and pinprick, pain.  Cerebellum:   FTN/HKS intact with normal BIJU in all limbs.  No dysmetria or dysdiadokinesia.  Gait examination deferred.      Labs:                         13.2   8.74  )-----------( 129      ( 09 Dec 2019 09:01 )             39.7         138  |  102  |  20  ----------------------------<  91  4.4   |  26  |  1.1    Ca    8.6      09 Dec 2019 09:  Mg     2.0         TPro  6.4  /  Alb  4.1  /  TBili  0.2  /  DBili  x   /  AST  22  /  ALT  19  /  AlkPhos  56      LIVER FUNCTIONS - ( 09 Dec 2019 09:01 )  Alb: 4.1 g/dL / Pro: 6.4 g/dL / ALK PHOS: 56 U/L / ALT: 19 U/L / AST: 22 U/L / GGT: x               Valproic Acid Level, Serum: 86.0 ug/mL [50.0 - 100.0] (19 @ 09:01)  Phenytoin Level, Serum: 8.0 ug/mL [10.0 - 20.0] (19 @ 09:01)  Phenobarbital Level, Serum: 33.1 ug/mL [15.0 - 40.0] (19 @ 09:01)  Phenytoin Level, Serum: 7.1 ug/mL [10.0 - 20.0] (19 @ 21:50)  Phenobarbital Level, Serum: 31.1 ug/mL [15.0 - 40.0] (19 @ 19:55)  Valproic Acid Level, Serum: 79.0 ug/mL [50.0 - 100.0] (19 @ 19:55)        Neuroimaging:  NCHCT: CT Head No Cont:   EXAM:  CT BRAIN            PROCEDURE DATE:  2019        INTERPRETATION:  CLINICAL HISTORY/REASON FOR EXAM: History of seizure.    TECHNIQUE: Contiguous axial CT images of the head were obtained from the   base of the skull to the vertex without IV contrast. Coronal and sagittal   reformats were obtained.    COMPARISON: CT head 2019.    FINDINGS:    The cortical sulci and ventricular system are appropriate for patient's   stated age.     No acute intracranial hemorrhage. No mass effect, midline shift, or   intracranial fluid collection.     Gray-white differentiation is maintained.    The calvarium is intact. There is no soft tissue swelling.     The visualized paranasal sinuses and mastoid air cells are well aerated.    IMPRESSION:  No evidence of acute intracranial pathology.    In view of the history of seizure, MRI of the brain is recommended for   further evaluation if clinically warranted and not contra-indicated in   this patient.    FAITH ASKEW M.D., ATTENDING RADIOLOGIST  This document has been electronically signed. Dec  7 2019  8:10PM      (19 @ 20:05)        Assessment:  This is a 64y Male with h/o DLD, seizure disorder diagnosed at 19 yo, brought to ED by sister for evaluation. Patient reportedly has increased seizure frequency on 3 AEDs. He was in ED twice in the last 2 weeks for breakthrough seizures and was recommended admission, but both times left AMA. Patient had stat AED levels sent this morning in the ED, but they were random levels. Patient initially agreed to VEEG monitoring, but then changed his mind.      Discussed with Dr. Casas      Plan:   - VEEG for better characterization and treatment plan if patient agrees  - Seizure precautions  - Continue pre-admission doses of AED for now  - Ativan 2mg IV PRN for generalized tonic-clonic seizure lasting longer than 2 minutes, or two consecutive seizures without return to baseline in-between (ordered)  - CBC, CMP, Mg, CK, AED levels trough (ordered)  - Keep Mg above 2      Discussed with hospitalist Dr. Lewis who is currently at the bedside to evaluate patient.  Discussed with nursing team. Neurology/Epilepsy Consult:    GERALD WONG 64y Male  MRN-[id number]    Patient is a 64y old left-handed Male who was brought to ED for seizure evaluation      HPI: History obtained from patient and sister Radha Thapa [phone]. EMR reviewed.   Patient was brought this morning to ED [facility] by sister for seizure evaluation. Patient reportedly had 2 seizures in the last 2 weeks ( and ) when was found by relatives or HHA unresponsive with generalized body shaking lasting up to 30 minutes and followed by confusion. Patient was evaluated in ED [place] both times, was recommended admission and transfer to EMU for seizure medication adjustment, but he signed out AMA.  Prior to that patient was in the ED several times in July and 2019 for breakthrough seizures. Patient was recommended admission 19, but also left AMA.    Patient reports having h/o seizures since 19yo. He does not remember any of his seizures. Currently he  lives with sister and her family, prior to that lived with wife. Patient knows the names of seizure medications he is taking, but does know the doses. He reports taking medications regularly, denies any missed doses. States took all 3 of his seizure medications a7 am today. Patient initially agreed to have VEEG and signed consent, then changed his mind and stated that he wanted to leave the hospital  .   I contacted patient's sister Radha who states that patient used to live in [place] with wife, but she passed away and he moved to [place] 6 months ago. Patient is taking medications regularly, he has HHA during the day. He was previously followed by neurologist in [place] (does not know the name), recently was seen at [facility] neurology clinic for the first time.   When sister was informed that patient does not want to stay in the hospital, she started screaming and hang up.    Patient was evaluated at [facility] neurology clinic 2019 and was recommended to take Dilantin 200mg AM & 300mg HS, Depakote 250mg daily, Phenobarbital 32.4mg daily, and to have EEG. There is no record of patient having EEG found.  Patient's pharmacy contacted to clarify AED doses (see below). Patient has a paperwork dated 2019 from [facility] that has different doses of Depakote and Phenobarbital, and at this point it is not very clear how much exactly patient is taking.         PAST MEDICAL & SURGICAL HISTORY:  Epilepsy  HTN   DLD  No significant past surgical history        FAMILY HISTORY:  No seizures      Social History:   Lives with sister  Not employed      Risk factors:  Birth history is not available      Allergy:  No Known Allergies        Home Medications as per [facility] ([phone]) last filled in 2019:  Phenytoin 200mg in AM and 300mg in HS  Phenobarbital 32.4mg 1 tablet daily  Depakote 250mg 1 tablet daily  Lipitor 20mg daily        MEDICATIONS  (STANDING):  diVALproex  milliGRAM(s) Oral daily  diVALproex  milliGRAM(s) Oral <User Schedule>  phenytoin   Capsule 200 milliGRAM(s) Oral daily  phenytoin   Capsule 300 milliGRAM(s) Oral <User Schedule>    MEDICATIONS  (PRN):          T(F): 98.7 (19 @ 11:42), Max: 99.5 (19 @ 08:55)  HR: 75 (19 @ 11:42) (75 - 83)  BP: 98/64 (19 @ 11:42) (93/54 - 98/64)  RR: 18 (19 @ 11:42) (17 - 18)  SpO2: 100% (19 @ 11:42) (99% - 100%)      Neurologic Examination:  General:  Appearance is consistent with chronologic age.  No abnormal facies.   General: The patient is calm, oriented to person, place, month and year.  Follows 3-step directions. Language is slow with normal repetition and naming.  Nondysarthric. Some questions need to be repeated or rephrased.  Cranial nerves: EOMI w/o nystagmus, skew or reported double vision.  PERRL.  No ptosis/weakness of eyelid closure.  Facial sensation is normal with normal bite.  No facial asymmetry.  Hearing grossly intact b/l.  Palate elevates midline.  Tongue midline.  Motor examination:   Normal tone, bulk and range of motion.  No tenderness, twitching, tremors or involuntary movements.  Formal Muscle Strength Testin/5 UE; 5/5 LE.  No observable drift.  Reflexes:   2+ b/l   Sensory examination:   Intact to light touch and pinprick, pain.  Cerebellum:   FTN/HKS intact with normal BIJU in all limbs.  No dysmetria or dysdiadokinesia.  Gait examination deferred.      Labs:                         13.2   8.74  )-----------( 129      ( 09 Dec 2019 09:01 )             39.7         138  |  102  |  20  ----------------------------<  91  4.4   |  26  |  1.1    Ca    8.6      09 Dec 2019 09:01  Mg     2.0         TPro  6.4  /  Alb  4.1  /  TBili  0.2  /  DBili  x   /  AST  22  /  ALT  19  /  AlkPhos  56      LIVER FUNCTIONS - ( 09 Dec 2019 09:01 )  Alb: 4.1 g/dL / Pro: 6.4 g/dL / ALK PHOS: 56 U/L / ALT: 19 U/L / AST: 22 U/L / GGT: x               Valproic Acid Level, Serum: 86.0 ug/mL [50.0 - 100.0] (19 @ 09:01)  Phenytoin Level, Serum: 8.0 ug/mL [10.0 - 20.0] (19 @ 09:01)  Phenobarbital Level, Serum: 33.1 ug/mL [15.0 - 40.0] (19 @ 09:01)  Phenytoin Level, Serum: 7.1 ug/mL [10.0 - 20.0] (19 @ 21:50)  Phenobarbital Level, Serum: 31.1 ug/mL [15.0 - 40.0] (19 @ 19:55)  Valproic Acid Level, Serum: 79.0 ug/mL [50.0 - 100.0] (19 @ 19:55)        Neuroimaging:  NCHCT: CT Head No Cont:   EXAM:  CT BRAIN            PROCEDURE DATE:  2019        INTERPRETATION:  CLINICAL HISTORY/REASON FOR EXAM: History of seizure.    TECHNIQUE: Contiguous axial CT images of the head were obtained from the   base of the skull to the vertex without IV contrast. Coronal and sagittal   reformats were obtained.    COMPARISON: CT head 2019.    FINDINGS:    The cortical sulci and ventricular system are appropriate for patient's   stated age.     No acute intracranial hemorrhage. No mass effect, midline shift, or   intracranial fluid collection.     Gray-white differentiation is maintained.    The calvarium is intact. There is no soft tissue swelling.     The visualized paranasal sinuses and mastoid air cells are well aerated.    IMPRESSION:  No evidence of acute intracranial pathology.    In view of the history of seizure, MRI of the brain is recommended for   further evaluation if clinically warranted and not contra-indicated in   this patient.    FAITH ASKEW M.D., ATTENDING RADIOLOGIST  This document has been electronically signed. Dec  7 2019  8:10PM      (19 @ 20:05)        Assessment:  This is a 64y Male with h/o DLD, seizure disorder diagnosed at 21 yo, brought to ED by sister for evaluation. Patient reportedly has increased seizure frequency on 3 AEDs. He was in ED twice in the last 2 weeks for breakthrough seizures and was recommended admission, but both times left AMA. Patient had stat AED levels sent this morning in the ED, but they were random levels. Patient initially agreed to VEEG monitoring, but then changed his mind.      Discussed with Dr. Casas      Plan:   - VEEG for better characterization and treatment plan if patient agrees  - Seizure precautions  - Continue pre-admission doses of AED for now  - Ativan 2mg IV PRN for generalized tonic-clonic seizure lasting longer than 2 minutes, or two consecutive seizures without return to baseline in-between (ordered)  - CBC, CMP, Mg, CK, AED levels trough (ordered)  - Keep Mg above 2      Discussed with hospitalist Dr. Lewis who is currently at the bedside to evaluate patient.  Discussed with nursing team.

## 2019-12-09 NOTE — CHART NOTE - NSCHARTNOTEFT_GEN_A_CORE
Neurology/Epilepsy NP:    Patient agreed to have VEEG, monitoring started.  He was evaluated by psychiatry for suicidal ideation as reported by sister who called [facility] and spoke with RN.  Patient denies ever having any suicidal thoughts. He is currently on 1:1 for safety.    Plan :  VEEG monitoring started  Seizure/safety precautions   evaluation for discharge planning    Will follow

## 2019-12-09 NOTE — BEHAVIORAL HEALTH ASSESSMENT NOTE - NSBHCHARTREVIEWLAB_PSY_A_CORE FT
13.2   8.74  )-----------( 129      ( 09 Dec 2019 09:01 )             39.7   12-09    138  |  102  |  20  ----------------------------<  91  4.4   |  26  |  1.1    Ca    8.6      09 Dec 2019 09:01  Mg     2.0     12-09    TPro  6.4  /  Alb  4.1  /  TBili  0.2  /  DBili  x   /  AST  22  /  ALT  19  /  AlkPhos  56  12-09

## 2019-12-09 NOTE — BEHAVIORAL HEALTH ASSESSMENT NOTE - NSBHCHARTREVIEWINVESTIGATE_PSY_A_CORE FT
< from: 12 Lead ECG (12.09.19 @ 09:05) >    Diagnosis Line Normal sinus rhythm  Normal ECG    < end of copied text >

## 2019-12-09 NOTE — ED PROVIDER NOTE - CLINICAL SUMMARY MEDICAL DECISION MAKING FREE TEXT BOX
poor seizure control despite 3 meds.  needs eeg monitoring to optimize care.  In my opinion, in patient treatment is medically justifiable and appropriate.

## 2019-12-09 NOTE — H&P ADULT - NSHPLABSRESULTS_GEN_ALL_CORE
13.2   8.74  )-----------( 129      ( 09 Dec 2019 09:01 )             39.7       12-09    138  |  102  |  20  ----------------------------<  91  4.4   |  26  |  1.1    Ca    8.6      09 Dec 2019 09:01  Mg     2.0     12-09    TPro  6.4  /  Alb  4.1  /  TBili  0.2  /  DBili  x   /  AST  22  /  ALT  19  /  AlkPhos  56  12-09          Magnesium, Serum: 2.0 mg/dL (12-09-19 @ 09:01)    CARDIAC MARKERS ( 07 Dec 2019 19:55 )  x     / <0.01 ng/mL / x     / x     / x        < from: Xray Chest 2 Views PA/Lat (12.07.19 @ 21:02) >    IMPRESSION:  No evidence of acute cardiopulmonary disease.                  FAITH ASKEW M.D., ATTENDING RADIOLOGIST  This document has been electronically signed. Dec  7 2019  9:53PM    < end of copied text >      < from: CT Head No Cont (12.07.19 @ 20:05) >    IMPRESSION:  No evidence of acute intracranial pathology.    In view of the history of seizure, MRI of the brain is recommended for   further evaluation if clinically warranted and not contra-indicated in   this patient.                  FAITH ASKEW M.D., ATTENDING RADIOLOGIST  This document has been electronically signed. Dec  7 2019  8:10PM                < end of copied text >

## 2019-12-09 NOTE — H&P ADULT - NSHPPHYSICALEXAM_GEN_ALL_CORE
Vital Signs Last 24 Hrs  T(C): 37.1 (09 Dec 2019 11:42), Max: 37.5 (09 Dec 2019 08:55)  T(F): 98.7 (09 Dec 2019 11:42), Max: 99.5 (09 Dec 2019 08:55)  HR: 75 (09 Dec 2019 11:42) (75 - 83)  BP: 98/64 (09 Dec 2019 11:42) (93/54 - 98/64)  BP(mean): --  RR: 18 (09 Dec 2019 11:42) (17 - 18)  SpO2: 100% (09 Dec 2019 11:42) (99% - 100%)    T(C): 37.1 (12-09-19 @ 11:42), Max: 37.5 (12-09-19 @ 08:55)  HR: 75 (12-09-19 @ 11:42) (75 - 83)  BP: 98/64 (12-09-19 @ 11:42) (93/54 - 98/64)  RR: 18 (12-09-19 @ 11:42) (17 - 18)  SpO2: 100% (12-09-19 @ 11:42) (99% - 100%)    PHYSICAL EXAM:  GENERAL: NAD, well-developed, well nourished, looks stated age  HEAD:  Atraumatic, Normocephalic  EYES: EOMI, PERRLA, conjunctiva and sclera clear  NECK: Supple, No JVD, no bruits, no masses, no thyroid enlargement  ENMT: No tonsillar erythema, exudated or enlargement, moist mucous membranes  CHEST/LUNG: Clear to auscultation bilaterally; No rales, rhonchi or wheezing, no dullness to percussion  HEART: S1,S2 Regular rate and rhythm; No murmurs, rubs, or gallops  ABDOMEN: Soft, nontender, nondistended, no rebound tenderness; No palpable masses, no hernias, no organomegaly; Bowel sounds present and normoactive  EXTREMITIES:  2+ peripheral pulses bilaterally and symmetrically, no clubbing, cyanosis, or edema  LYMPH: No lymphadenopathy  PSYCH: AAOx3, normal mood and affect  NEUROLOGY: non-focal, muscle strength 5/5 all extremities, DTRs 2+ symmetrically  SKIN: No rashes or lesions Vital Signs Last 24 Hrs  T(C): 37.1 (09 Dec 2019 11:42), Max: 37.5 (09 Dec 2019 08:55)  T(F): 98.7 (09 Dec 2019 11:42), Max: 99.5 (09 Dec 2019 08:55)  HR: 75 (09 Dec 2019 11:42) (75 - 83)  BP: 98/64 (09 Dec 2019 11:42) (93/54 - 98/64)  BP(mean): --  RR: 18 (09 Dec 2019 11:42) (17 - 18)  SpO2: 100% (09 Dec 2019 11:42) (99% - 100%)    T(C): 37.1 (12-09-19 @ 11:42), Max: 37.5 (12-09-19 @ 08:55)  HR: 75 (12-09-19 @ 11:42) (75 - 83)  BP: 98/64 (12-09-19 @ 11:42) (93/54 - 98/64)  RR: 18 (12-09-19 @ 11:42) (17 - 18)  SpO2: 100% (12-09-19 @ 11:42) (99% - 100%)    PHYSICAL EXAM:  GENERAL: NAD, well-developed, well nourished, looks stated age  HEAD:  Atraumatic, Normocephalic  EYES: EOMI, PERRLA, conjunctiva and sclera clear  NECK: Supple, No JVD, no bruits, no masses, no thyroid enlargement  ENMT: No tonsillar erythema, exudated or enlargement, moist mucous membranes  CHEST/LUNG: Clear to auscultation bilaterally; No rales, rhonchi or wheezing, no dullness to percussion  HEART: S1,S2 Regular rate and rhythm; No murmurs, rubs, or gallops  ABDOMEN: Soft, nontender, nondistended, no rebound tenderness; No palpable masses, no hernias, no organomegaly; Bowel sounds present and normoactive  EXTREMITIES:  2+ peripheral pulses bilaterally and symmetrically, no clubbing, cyanosis, or edema  LYMPH: No lymphadenopathy  PSYCH: AAOx3, normal mood and affect  NEUROLOGY: non-focal, muscle confused at time strength 5/5 all extremities, DTRs 2+ symmetrically  SKIN: No rashes or lesions

## 2019-12-10 LAB
ALBUMIN SERPL ELPH-MCNC: 4 G/DL — SIGNIFICANT CHANGE UP (ref 3.5–5.2)
ALP SERPL-CCNC: 51 U/L — SIGNIFICANT CHANGE UP (ref 30–115)
ALT FLD-CCNC: 20 U/L — SIGNIFICANT CHANGE UP (ref 0–41)
AMMONIA BLD-MCNC: 37 UMOL/L — SIGNIFICANT CHANGE UP (ref 11–55)
ANION GAP SERPL CALC-SCNC: 14 MMOL/L — SIGNIFICANT CHANGE UP (ref 7–14)
AST SERPL-CCNC: 23 U/L — SIGNIFICANT CHANGE UP (ref 0–41)
BASOPHILS # BLD AUTO: 0.05 K/UL — SIGNIFICANT CHANGE UP (ref 0–0.2)
BASOPHILS NFR BLD AUTO: 0.9 % — SIGNIFICANT CHANGE UP (ref 0–1)
BILIRUB SERPL-MCNC: 0.3 MG/DL — SIGNIFICANT CHANGE UP (ref 0.2–1.2)
BUN SERPL-MCNC: 21 MG/DL — HIGH (ref 10–20)
CALCIUM SERPL-MCNC: 8.7 MG/DL — SIGNIFICANT CHANGE UP (ref 8.5–10.1)
CHLORIDE SERPL-SCNC: 101 MMOL/L — SIGNIFICANT CHANGE UP (ref 98–110)
CK SERPL-CCNC: 58 U/L — SIGNIFICANT CHANGE UP (ref 0–225)
CO2 SERPL-SCNC: 27 MMOL/L — SIGNIFICANT CHANGE UP (ref 17–32)
CREAT SERPL-MCNC: 1.1 MG/DL — SIGNIFICANT CHANGE UP (ref 0.7–1.5)
EOSINOPHIL # BLD AUTO: 0.47 K/UL — SIGNIFICANT CHANGE UP (ref 0–0.7)
EOSINOPHIL NFR BLD AUTO: 8.2 % — HIGH (ref 0–8)
FOLATE SERPL-MCNC: 14 NG/ML — SIGNIFICANT CHANGE UP
GLUCOSE SERPL-MCNC: 128 MG/DL — HIGH (ref 70–99)
HCT VFR BLD CALC: 40.5 % — LOW (ref 42–52)
HCV AB S/CO SERPL IA: 0.11 S/CO — SIGNIFICANT CHANGE UP (ref 0–0.99)
HCV AB SERPL-IMP: SIGNIFICANT CHANGE UP
HGB BLD-MCNC: 13.5 G/DL — LOW (ref 14–18)
IMM GRANULOCYTES NFR BLD AUTO: 1 % — HIGH (ref 0.1–0.3)
LYMPHOCYTES # BLD AUTO: 1.88 K/UL — SIGNIFICANT CHANGE UP (ref 1.2–3.4)
LYMPHOCYTES # BLD AUTO: 32.9 % — SIGNIFICANT CHANGE UP (ref 20.5–51.1)
MAGNESIUM SERPL-MCNC: 2.3 MG/DL — SIGNIFICANT CHANGE UP (ref 1.8–2.4)
MCHC RBC-ENTMCNC: 30.9 PG — SIGNIFICANT CHANGE UP (ref 27–31)
MCHC RBC-ENTMCNC: 33.3 G/DL — SIGNIFICANT CHANGE UP (ref 32–37)
MCV RBC AUTO: 92.7 FL — SIGNIFICANT CHANGE UP (ref 80–94)
MONOCYTES # BLD AUTO: 0.75 K/UL — HIGH (ref 0.1–0.6)
MONOCYTES NFR BLD AUTO: 13.1 % — HIGH (ref 1.7–9.3)
NEUTROPHILS # BLD AUTO: 2.51 K/UL — SIGNIFICANT CHANGE UP (ref 1.4–6.5)
NEUTROPHILS NFR BLD AUTO: 43.9 % — SIGNIFICANT CHANGE UP (ref 42.2–75.2)
NRBC # BLD: 0 /100 WBCS — SIGNIFICANT CHANGE UP (ref 0–0)
PHENOBARB SERPL-MCNC: 31.1 UG/ML — SIGNIFICANT CHANGE UP (ref 15–40)
PHENYTOIN FREE SERPL-MCNC: 10.8 UG/ML — SIGNIFICANT CHANGE UP (ref 10–20)
PLATELET # BLD AUTO: 124 K/UL — LOW (ref 130–400)
POTASSIUM SERPL-MCNC: 4.4 MMOL/L — SIGNIFICANT CHANGE UP (ref 3.5–5)
POTASSIUM SERPL-SCNC: 4.4 MMOL/L — SIGNIFICANT CHANGE UP (ref 3.5–5)
PROT SERPL-MCNC: 6.4 G/DL — SIGNIFICANT CHANGE UP (ref 6–8)
RBC # BLD: 4.37 M/UL — LOW (ref 4.7–6.1)
RBC # FLD: 13.9 % — SIGNIFICANT CHANGE UP (ref 11.5–14.5)
SODIUM SERPL-SCNC: 142 MMOL/L — SIGNIFICANT CHANGE UP (ref 135–146)
T PALLIDUM AB TITR SER: NEGATIVE — SIGNIFICANT CHANGE UP
TSH SERPL-MCNC: 5.32 UIU/ML — HIGH (ref 0.27–4.2)
VALPROATE SERPL-MCNC: 26 UG/ML — LOW (ref 50–100)
VIT B12 SERPL-MCNC: 467 PG/ML — SIGNIFICANT CHANGE UP (ref 232–1245)
WBC # BLD: 5.72 K/UL — SIGNIFICANT CHANGE UP (ref 4.8–10.8)
WBC # FLD AUTO: 5.72 K/UL — SIGNIFICANT CHANGE UP (ref 4.8–10.8)

## 2019-12-10 PROCEDURE — 99231 SBSQ HOSP IP/OBS SF/LOW 25: CPT

## 2019-12-10 PROCEDURE — 99221 1ST HOSP IP/OBS SF/LOW 40: CPT

## 2019-12-10 PROCEDURE — 99233 SBSQ HOSP IP/OBS HIGH 50: CPT

## 2019-12-10 PROCEDURE — 95951: CPT | Mod: 26

## 2019-12-10 RX ORDER — DIVALPROEX SODIUM 500 MG/1
500 TABLET, DELAYED RELEASE ORAL ONCE
Refills: 0 | Status: COMPLETED | OUTPATIENT
Start: 2019-12-10 | End: 2019-12-10

## 2019-12-10 RX ORDER — HEPARIN SODIUM 5000 [USP'U]/ML
5000 INJECTION INTRAVENOUS; SUBCUTANEOUS EVERY 12 HOURS
Refills: 0 | Status: DISCONTINUED | OUTPATIENT
Start: 2019-12-10 | End: 2019-12-11

## 2019-12-10 RX ORDER — PHENOBARBITAL 60 MG
32.4 TABLET ORAL ONCE
Refills: 0 | Status: DISCONTINUED | OUTPATIENT
Start: 2019-12-10 | End: 2019-12-10

## 2019-12-10 RX ORDER — PHENOBARBITAL 60 MG
32.4 TABLET ORAL EVERY 12 HOURS
Refills: 0 | Status: DISCONTINUED | OUTPATIENT
Start: 2019-12-10 | End: 2019-12-11

## 2019-12-10 RX ORDER — DIVALPROEX SODIUM 500 MG/1
500 TABLET, DELAYED RELEASE ORAL EVERY 12 HOURS
Refills: 0 | Status: DISCONTINUED | OUTPATIENT
Start: 2019-12-10 | End: 2019-12-11

## 2019-12-10 RX ADMIN — DIVALPROEX SODIUM 500 MILLIGRAM(S): 500 TABLET, DELAYED RELEASE ORAL at 21:08

## 2019-12-10 RX ADMIN — DIVALPROEX SODIUM 500 MILLIGRAM(S): 500 TABLET, DELAYED RELEASE ORAL at 11:23

## 2019-12-10 RX ADMIN — Medication 32.4 MILLIGRAM(S): at 21:08

## 2019-12-10 RX ADMIN — DIVALPROEX SODIUM 250 MILLIGRAM(S): 500 TABLET, DELAYED RELEASE ORAL at 11:25

## 2019-12-10 RX ADMIN — Medication 200 MILLIGRAM(S): at 11:24

## 2019-12-10 RX ADMIN — Medication 32.4 MILLIGRAM(S): at 11:24

## 2019-12-10 RX ADMIN — Medication 300 MILLIGRAM(S): at 21:07

## 2019-12-10 NOTE — PROGRESS NOTE ADULT - SUBJECTIVE AND OBJECTIVE BOX
Epilepsy Attending Note:     GERALD WONG    64y Male  MRN [id number]    Vital Signs Last 24 Hrs  T(C): 36.3 (10 Dec 2019 05:24), Max: 37.1 (09 Dec 2019 11:42)  T(F): 97.4 (10 Dec 2019 05:24), Max: 98.7 (09 Dec 2019 11:42)  HR: 60 (10 Dec 2019 05:24) (60 - 81)  BP: 95/51 (10 Dec 2019 05:24) (95/51 - 98/64)  BP(mean): --  RR: 16 (10 Dec 2019 05:24) (16 - 18)  SpO2: 100% (09 Dec 2019 11:42) (100% - 100%)                          13.5   5.72  )-----------( 124      ( 10 Dec 2019 08:39 )             40.5       12-09    138  |  102  |  20  ----------------------------<  91  4.4   |  26  |  1.1    Ca    8.6      09 Dec 2019 09:01  Mg     2.0     12-09    TPro  6.4  /  Alb  4.1  /  TBili  0.2  /  DBili  x   /  AST  22  /  ALT  19  /  AlkPhos  56  12-09      MEDICATIONS  (STANDING):  diVALproex  milliGRAM(s) Oral daily  diVALproex  milliGRAM(s) Oral <User Schedule>  heparin  Injectable 5000 Unit(s) SubCutaneous every 12 hours  PHENobarbital 32.4 milliGRAM(s) Oral at bedtime  phenytoin   Capsule 200 milliGRAM(s) Oral <User Schedule>  phenytoin   Capsule 300 milliGRAM(s) Oral <User Schedule>    MEDICATIONS  (PRN):      Phenytoin Level, Serum: 7.6 ug/mL [10.0 - 20.0] (12-09-19 @ 19:17)  Valproic Acid Level, Serum: 44.0 ug/mL [50.0 - 100.0] (12-09-19 @ 19:17)  Phenobarbital Level, Serum: 29.8 ug/mL [15.0 - 40.0] (12-09-19 @ 19:17)  Valproic Acid Level, Serum: 86.0 ug/mL [50.0 - 100.0] (12-09-19 @ 09:01)  Phenytoin Level, Serum: 8.0 ug/mL [10.0 - 20.0] (12-09-19 @ 09:01)  Phenobarbital Level, Serum: 33.1 ug/mL [15.0 - 40.0] (12-09-19 @ 09:01)  Phenytoin Level, Serum: 7.1 ug/mL [10.0 - 20.0] (12-07-19 @ 21:50)  Phenobarbital Level, Serum: 31.1 ug/mL [15.0 - 40.0] (12-07-19 @ 19:55)  Valproic Acid Level, Serum: 79.0 ug/mL [50.0 - 100.0] (12-07-19 @ 19:55)        VEEG in the last 24 hours:    Background----------low amplitude 8-9 superimposed by small amount of lower range theta    Focal and generalized slowing------no focal slowing                                                      borderline to mild generalized slowing    Interictal activity---none    Events---none    Seizures---none    Impression:   borderline to mild abnormal EEG due to the above    Plan - continue the  monitoring          consider HV/PS         Discussed with the sister and the patient

## 2019-12-10 NOTE — PROGRESS NOTE ADULT - SUBJECTIVE AND OBJECTIVE BOX
reviewed and referred to chart notes. collaterals obtained from sister.    pt is a poor hsitorian and demonstrates cognitive deficits in memory functions. also has limited understanding of his medical and health issues and does not want to stay in hospital, wanting to leave. rosaster is involved in his care and is supportive., she is willing to take him home once medical evaluation and treatment is complete. pt is not suicidal or homicidal. no evidence of major mood or anxiety sx. no psychosis.

## 2019-12-10 NOTE — PROGRESS NOTE ADULT - ATTENDING COMMENTS
Plan of care was discussed with patient,   in great details, All questions were answered to their satisfication.  Seems to understand, and in agreement

## 2019-12-10 NOTE — PROGRESS NOTE ADULT - ASSESSMENT
seizure disorder  r/o neurocognitive disorder    pt currently lacks capacity to leave on his own ama.  1:1 for elopement and safety  continue medical stabilization.

## 2019-12-10 NOTE — PROGRESS NOTE ADULT - SUBJECTIVE AND OBJECTIVE BOX
Neurology/Epilepsy NP:    Attempted to get additional history from patient's sister and HHA regarding the doses of AEDs, but the information is very inconsistent.  Patient's sister is unsure how much patient is taking, while HHA states he takes all medications 3 times daily but she is working with patient only 3.5 hrs per day therefore does not know what happens after she leaves.  Patient pulled off EEG leads this morning while his sister was visiting.    As per outside pharmacy and [facility] clinic neuro notes, patient should be taking:  Dilantin 200mg AM & 300mg HS  Depakote 250mg 1 tab daily  Phenobarbital 32.4 mg daily.    Patient was put on the same doses of AED yesterday on admission. Considering the serum levels of medications, it appears that patient was taking higher doses of Phenobarbital and Depakote than listed above.    Phenytoin Level, Serum: 10.8 ug/mL (12.10.19 @ 08:39)  Phenytoin Level, Serum: 7.6 ug/mL (12.09.19 @ 19:17)  Phenytoin Level, Serum: 8.0 ug/mL (12.09.19 @ 09:01)      Valproic Acid Level, Serum: 26.0 ug/mL (12.10.19 @ 08:39)  Valproic Acid Level, Serum: 44.0 ug/mL (12.09.19 @ 19:17)  Valproic Acid Level, Serum: 86.0 ug/mL (12.09.19 @ 09:01)      Phenobarbital Level, Serum: 31.1 ug/mL (12.10.19 @ 08:39)  Phenobarbital Level, Serum: 29.8 ug/mL (12.09.19 @ 19:17)  Phenobarbital Level, Serum: 33.1 ug/mL (12.09.19 @ 09:01)    Plan -   Continue same dose of Dilantin 200mg AM & 300mg HS  Give additional Depakote 500mg x 1 stat, then increase maintenance dose to 500mg q12hrs  Phenobarbital 32.4mg q12hrs for now  AED levels trough in AM  Restart VEEG 9patient agrees at this time)    All medications and labs were ordered by me.  Plan discussed with hospitalist and nursing team.    Will follow.

## 2019-12-10 NOTE — PROGRESS NOTE ADULT - SUBJECTIVE AND OBJECTIVE BOX
CC.  Seizure  HPI.  Patient reports that he feels better.  Alert and orientated X3  Offers no complaints  Denies any SI/HI              Constitutional: No fever, fatigue or weight loss.  Skin: No rash.  Eyes: No recent vision problems or eye pain.  ENT: No congestion, ear pain, or sore throat.  Endocrine: No thyroid problems.  Cardiovascular: No chest pain or palpation.  Respiratory: No cough, shortness of breath, congestion, or wheezing.  Gastrointestinal: No abdominal pain, nausea, vomiting, or diarrhea.  Genitourinary: No dysuria.  Musculoskeletal: No joint swelling.  Neurologic: No headache.      Vital Signs Last 24 Hrs  T(C): 36.3 (12-10-19 @ 05:24), Max: 37.5 (12-09-19 @ 08:55)  T(F): 97.4 (12-10-19 @ 05:24), Max: 99.5 (12-09-19 @ 08:55)  HR: 60 (12-10-19 @ 05:24) (60 - 83)  BP: 95/51 (12-10-19 @ 05:24) (93/54 - 98/64)  BP(mean): --  RR: 16 (12-10-19 @ 05:24) (16 - 18)  SpO2: 100% (12-09-19 @ 11:42) (99% - 100%)        PHYSICAL EXAM-  GENERAL: NAD,   HEAD:  Atraumatic, Normocephalic  EYES: EOMI, PERRLA, conjunctiva and sclera clear  NECK: Supple, No JVD, Normal thyroid  NERVOUS SYSTEM:  Alert & Oriented X3, Motor Strength 5/5 B/L upper and lower extremities; DTRs 2+ intact and symmetric  CHEST/LUNG: Clear to percussion bilaterally; No rales, rhonchi, wheezing, or rubs  HEART: Regular rate and rhythm; No murmurs, rubs, or gallops  ABDOMEN: Soft, Nontender, Nondistended; Bowel sounds present  EXTREMITIES:   , No clubbing, cyanosis, or edema  SKIN: No rashes or lesions                                  13.2   8.74  )-----------( 129      ( 09 Dec 2019 09:01 )             39.7     12-09    138  |  102  |  20  ----------------------------<  91  4.4   |  26  |  1.1    Ca    8.6      09 Dec 2019 09:01  Mg     2.0     12-09    TPro  6.4  /  Alb  4.1  /  TBili  0.2  /  DBili  x   /  AST  22  /  ALT  19  /  AlkPhos  56  12-09    CARDIAC MARKERS ( 09 Dec 2019 19:17 )  x     / x     / 57 U/L / x     / x                      MEDICATIONS  (STANDING):  diVALproex  milliGRAM(s) Oral daily  diVALproex  milliGRAM(s) Oral <User Schedule>  PHENobarbital 32.4 milliGRAM(s) Oral at bedtime  phenytoin   Capsule 200 milliGRAM(s) Oral <User Schedule>  phenytoin   Capsule 300 milliGRAM(s) Oral <User Schedule>    MEDICATIONS  (PRN):          RADIOLOGY RESULTS:

## 2019-12-10 NOTE — PROGRESS NOTE ADULT - ASSESSMENT
65 yo male with h/o seizures since 21yo presents after 2 seizures in the last 2 weeks and presents for VEEG.      1. Seizure  seizure precausion  Continue with home medicaitons  -VEEG  -Neurology to follow up     2. AMS  -patient requesting to sign AMA.  As per Sister , patient wants to hurt himself, told her that he is going to jump in front of moving bus after being discharged from the hospital  -deemed to be unable to make decision as per psych  -cannot leave AMA  -head CT scan negative  - TSH, B12, folate, RPR level, and Neurology consult are pending  -Continue with neuro check  -Please follow up psych recommendation regarding medications for agitation  -1:1 sitter

## 2019-12-11 ENCOUNTER — TRANSCRIPTION ENCOUNTER (OUTPATIENT)
Age: 64
End: 2019-12-11

## 2019-12-11 VITALS
RESPIRATION RATE: 16 BRPM | SYSTOLIC BLOOD PRESSURE: 92 MMHG | DIASTOLIC BLOOD PRESSURE: 51 MMHG | TEMPERATURE: 97 F | HEART RATE: 63 BPM

## 2019-12-11 LAB
ALBUMIN SERPL ELPH-MCNC: 4 G/DL — SIGNIFICANT CHANGE UP (ref 3.5–5.2)
ALP SERPL-CCNC: 56 U/L — SIGNIFICANT CHANGE UP (ref 30–115)
ALT FLD-CCNC: 24 U/L — SIGNIFICANT CHANGE UP (ref 0–41)
ANION GAP SERPL CALC-SCNC: 12 MMOL/L — SIGNIFICANT CHANGE UP (ref 7–14)
AST SERPL-CCNC: 27 U/L — SIGNIFICANT CHANGE UP (ref 0–41)
BILIRUB SERPL-MCNC: 0.2 MG/DL — SIGNIFICANT CHANGE UP (ref 0.2–1.2)
BUN SERPL-MCNC: 22 MG/DL — HIGH (ref 10–20)
CALCIUM SERPL-MCNC: 8.7 MG/DL — SIGNIFICANT CHANGE UP (ref 8.5–10.1)
CHLORIDE SERPL-SCNC: 100 MMOL/L — SIGNIFICANT CHANGE UP (ref 98–110)
CO2 SERPL-SCNC: 26 MMOL/L — SIGNIFICANT CHANGE UP (ref 17–32)
CREAT SERPL-MCNC: 1 MG/DL — SIGNIFICANT CHANGE UP (ref 0.7–1.5)
GLUCOSE SERPL-MCNC: 144 MG/DL — HIGH (ref 70–99)
HCT VFR BLD CALC: 42 % — SIGNIFICANT CHANGE UP (ref 42–52)
HGB BLD-MCNC: 14 G/DL — SIGNIFICANT CHANGE UP (ref 14–18)
MAGNESIUM SERPL-MCNC: 2.4 MG/DL — SIGNIFICANT CHANGE UP (ref 1.8–2.4)
MCHC RBC-ENTMCNC: 31 PG — SIGNIFICANT CHANGE UP (ref 27–31)
MCHC RBC-ENTMCNC: 33.3 G/DL — SIGNIFICANT CHANGE UP (ref 32–37)
MCV RBC AUTO: 92.9 FL — SIGNIFICANT CHANGE UP (ref 80–94)
NRBC # BLD: 0 /100 WBCS — SIGNIFICANT CHANGE UP (ref 0–0)
PHENOBARB SERPL-MCNC: 30.5 UG/ML — SIGNIFICANT CHANGE UP (ref 15–40)
PHENYTOIN FREE SERPL-MCNC: 10.4 UG/ML — SIGNIFICANT CHANGE UP (ref 10–20)
PLATELET # BLD AUTO: 137 K/UL — SIGNIFICANT CHANGE UP (ref 130–400)
POTASSIUM SERPL-MCNC: 4.6 MMOL/L — SIGNIFICANT CHANGE UP (ref 3.5–5)
POTASSIUM SERPL-SCNC: 4.6 MMOL/L — SIGNIFICANT CHANGE UP (ref 3.5–5)
PROT SERPL-MCNC: 6.7 G/DL — SIGNIFICANT CHANGE UP (ref 6–8)
RBC # BLD: 4.52 M/UL — LOW (ref 4.7–6.1)
RBC # FLD: 13.6 % — SIGNIFICANT CHANGE UP (ref 11.5–14.5)
SODIUM SERPL-SCNC: 138 MMOL/L — SIGNIFICANT CHANGE UP (ref 135–146)
VALPROATE SERPL-MCNC: 30 UG/ML — LOW (ref 50–100)
WBC # BLD: 6.47 K/UL — SIGNIFICANT CHANGE UP (ref 4.8–10.8)
WBC # FLD AUTO: 6.47 K/UL — SIGNIFICANT CHANGE UP (ref 4.8–10.8)

## 2019-12-11 PROCEDURE — 99231 SBSQ HOSP IP/OBS SF/LOW 25: CPT

## 2019-12-11 PROCEDURE — 95951: CPT | Mod: 26

## 2019-12-11 PROCEDURE — 99239 HOSP IP/OBS DSCHRG MGMT >30: CPT

## 2019-12-11 RX ORDER — DIVALPROEX SODIUM 500 MG/1
3 TABLET, DELAYED RELEASE ORAL
Qty: 180 | Refills: 3
Start: 2019-12-11 | End: 2020-04-08

## 2019-12-11 RX ORDER — DIVALPROEX SODIUM 500 MG/1
750 TABLET, DELAYED RELEASE ORAL
Refills: 0 | Status: DISCONTINUED | OUTPATIENT
Start: 2019-12-11 | End: 2019-12-11

## 2019-12-11 RX ORDER — PHENOBARBITAL 60 MG
1 TABLET ORAL
Qty: 90 | Refills: 3
Start: 2019-12-11 | End: 2020-04-08

## 2019-12-11 RX ORDER — PHENOBARBITAL 60 MG
64.8 TABLET ORAL AT BEDTIME
Refills: 0 | Status: DISCONTINUED | OUTPATIENT
Start: 2019-12-11 | End: 2019-12-11

## 2019-12-11 RX ORDER — PHENOBARBITAL 60 MG
32.4 TABLET ORAL EVERY 24 HOURS
Refills: 0 | Status: DISCONTINUED | OUTPATIENT
Start: 2019-12-11 | End: 2019-12-11

## 2019-12-11 RX ADMIN — Medication 32.4 MILLIGRAM(S): at 11:42

## 2019-12-11 RX ADMIN — Medication 200 MILLIGRAM(S): at 11:41

## 2019-12-11 RX ADMIN — DIVALPROEX SODIUM 750 MILLIGRAM(S): 500 TABLET, DELAYED RELEASE ORAL at 11:42

## 2019-12-11 NOTE — DISCHARGE NOTE PROVIDER - PROVIDER TOKENS
PROVIDER:[TOKEN:[[id number]],FOLLOWUP:[1 month]],PROVIDER:[TOKEN:[[id number]],FOLLOWUP:[1 week]] PROVIDER:[TOKEN:[[id number]],FOLLOWUP:[1 month]],PROVIDER:[TOKEN:[[id number]],FOLLOWUP:[1 week]],PROVIDER:[TOKEN:[[id number]],FOLLOWUP:[2 weeks]] PROVIDER:[TOKEN:[[id number]],FOLLOWUP:[1 week]],PROVIDER:[TOKEN:[[id number]],FOLLOWUP:[2 weeks]],FREE:[LAST:[Bhatt],FIRST:[Yolette],PHONE:[phone]],FAX:[(   )    -],ADDRESS:[[address] [place] [address]],SCHEDULEDAPPT:[01/07/2020],SCHEDULEDAPPTTIME:[02:15 PM]]

## 2019-12-11 NOTE — DISCHARGE NOTE NURSING/CASE MANAGEMENT/SOCIAL WORK - PATIENT PORTAL LINK FT
You can access the FollowMyHealth Patient Portal offered by [facility] by registering at the following website: [email or web address]. By joining [employer]’s FollowMyHealth portal, you will also be able to view your health information using other applications (apps) compatible with our system.

## 2019-12-11 NOTE — DISCHARGE NOTE NURSING/CASE MANAGEMENT/SOCIAL WORK - NSDCFUADDAPPT_GEN_ALL_CORE_FT
F/U with PMD in 1-2 weeks   F/U with Neurologist at the [facility] on 01/07/2020 at 2:15PM @ [address], [place] [address] Tel [phone]  F/U with Psychiatry in 1-2 weeks

## 2019-12-11 NOTE — DISCHARGE NOTE PROVIDER - NSDCFUADDINST_GEN_ALL_CORE_FT
Avoid driving, avoid operating machinery, avoid working in heights, avoid unsupervised swimming, avoid sharp objects, avoid hot water temperature; no seizure provoking OTC stimulants till cleared by neurologist.

## 2019-12-11 NOTE — PROGRESS NOTE ADULT - SUBJECTIVE AND OBJECTIVE BOX
Epilepsy Attending Note:     GERALD WONG    64y Male  MRN [id number]    Vital Signs Last 24 Hrs  T(C): 36.4 (11 Dec 2019 05:35), Max: 36.6 (10 Dec 2019 22:00)  T(F): 97.5 (11 Dec 2019 05:35), Max: 97.8 (10 Dec 2019 22:00)  HR: 60 (11 Dec 2019 05:35) (60 - 97)  BP: 96/60 (11 Dec 2019 05:35) (93/53 - 96/60)  BP(mean): --  RR: 16 (11 Dec 2019 05:35) (16 - 18)  SpO2: --                          14.0   6.47  )-----------( 137      ( 11 Dec 2019 08:59 )             42.0       12-10    142  |  101  |  21<H>  ----------------------------<  128<H>  4.4   |  27  |  1.1    Ca    8.7      10 Dec 2019 08:39  Mg     2.3     12-10    TPro  6.4  /  Alb  4.0  /  TBili  0.3  /  DBili  x   /  AST  23  /  ALT  20  /  AlkPhos  51  12-10      MEDICATIONS  (STANDING):  diVALproex  milliGRAM(s) Oral every 12 hours  heparin  Injectable 5000 Unit(s) SubCutaneous every 12 hours  PHENobarbital 32.4 milliGRAM(s) Oral every 12 hours  phenytoin   Capsule 200 milliGRAM(s) Oral <User Schedule>  phenytoin   Capsule 300 milliGRAM(s) Oral <User Schedule>    MEDICATIONS  (PRN):  LORazepam   Injectable 2 milliGRAM(s) IntraMuscular once PRN Agitation  LORazepam   Injectable 2 milliGRAM(s) IV Push three times a day PRN generalized tonic-clonic seizure lasting longer than 2 minutes      Valproic Acid Level, Serum: 30.0 ug/mL [50.0 - 100.0] (12-11-19 @ 08:59)  Phenytoin Level, Serum: 10.4 ug/mL [10.0 - 20.0] (12-11-19 @ 08:59)  Phenobarbital Level, Serum: 30.5 ug/mL [15.0 - 40.0] (12-11-19 @ 08:59)  Phenytoin Level, Serum: 10.8 ug/mL [10.0 - 20.0] (12-10-19 @ 08:39)  Valproic Acid Level, Serum: 26.0 ug/mL [50.0 - 100.0] (12-10-19 @ 08:39)  Phenobarbital Level, Serum: 31.1 ug/mL [15.0 - 40.0] (12-10-19 @ 08:39)  Phenytoin Level, Serum: 7.6 ug/mL [10.0 - 20.0] (12-09-19 @ 19:17)  Valproic Acid Level, Serum: 44.0 ug/mL [50.0 - 100.0] (12-09-19 @ 19:17)  Phenobarbital Level, Serum: 29.8 ug/mL [15.0 - 40.0] (12-09-19 @ 19:17)  Valproic Acid Level, Serum: 86.0 ug/mL [50.0 - 100.0] (12-09-19 @ 09:01)  Phenytoin Level, Serum: 8.0 ug/mL [10.0 - 20.0] (12-09-19 @ 09:01)  Phenobarbital Level, Serum: 33.1 ug/mL [15.0 - 40.0] (12-09-19 @ 09:01)        VEEG in the last 24 hours:    Background----------------8-9 hz superimposed by small amount of low amplitude lower range theta     Focal and generalized slowing---borderline mild generalized slowing                                                mild right hemispheric focal slowing    Interictal activity----rare-small number of right FT sharps and sharp transients    Events----------------none    Seizures------------none    Impression:  abnormal as above    Plan - considering the levels done today ,will increase the depakote to 750 bid and phenobarb to 1 tab (32mg)am and 2 hs.           suggest after the F/U and establishing a baseline and relationship with the clinic ,to repeat the levels and start tapering off the dilantin and optimizing the other two AED          He does need F/U with neurology clinic and also Psychiatry          needs  assessment  for discharge plan

## 2019-12-11 NOTE — DISCHARGE NOTE PROVIDER - HOSPITAL COURSE
To be completed by the attending 63yo male poor historian with h/o seizures since 21yo presents for VEEG. Pt admits to 2 recent seizures in the last 2 weeks. Pt states he has LOC and shaking which lasts about 5 minutes. Denies any incontinence, post ictal lethargy. Pt admits he is compliant with meds. Last seizure 2 years ago prior to this last 2 weeks.        Pt states his meds were changed (unsure how) when he came to ER 2 weeks ago        Patient was admitted to the VEEG unit.  He remained on VEEG with medication adjujstment.  He will have close follow up with Neurology.  He will go home with his sister.

## 2019-12-11 NOTE — DISCHARGE NOTE PROVIDER - NSDCFUADDAPPT_GEN_ALL_CORE_FT
F/U with PMD in 1-2 weeks   F/U with Neurologist at the [facility] on 01/07/2020 at 2:15PM @ [address], [place] [address] Tel [phone] F/U with PMD in 1-2 weeks   F/U with Neurologist at the [facility] on 01/07/2020 at 2:15PM @ [address], [place] [address] Tel [phone]  F/U with Psychiatry in 1-2 weeks

## 2019-12-11 NOTE — DISCHARGE NOTE PROVIDER - NSDCFUSCHEDAPPT_GEN_ALL_CORE_FT
GERALD WONG ; 01/13/2020 ; [facility] [id number]ER[address] ; 01/16/2020 ; NPP Urology [address]

## 2019-12-11 NOTE — PROGRESS NOTE ADULT - SUBJECTIVE AND OBJECTIVE BOX
Epilepsy NP Note:    Follow up appointment is scheduled for patient at [facility]  for January 7, 2020 at 2:15 pm.    [address] [place] [address]  [phone]    Rx for AED sent to the pharmacy:  Dilantin 200mg in AM & 300mg in HS  Depakote DR 750mg q12hrs  Phenobarbital 32.4mg in AM & 64.8mg in HS    Patient needs CBC, CMP, AED levels trough to be done prior to follow up.    The long term plan is to gradually come off Dilantin as outpatient.    Discussed with hospitalist and PA. Epilepsy NP Note:    Follow up appointment is scheduled for patient at [facility]  for January 7, 2020 at 2:15 pm.    [address] [place] [address]  [phone]    Rx for AED sent to the pharmacy:  Dilantin 200mg in AM & 300mg in HS  Depakote DR 750mg q12hrs  Phenobarbital 32.4mg in AM & 64.8mg in HS    Patient needs CBC, CMP, AED levels trough to be done prior to follow up.    The long term plan is to optimize levels and gradually come off Dilantin as outpatient.    Discussed with hospitalist and PA.

## 2019-12-11 NOTE — DISCHARGE NOTE PROVIDER - CARE PROVIDER_API CALL
Marvin Casas)  Neurology  [address] [place] [address], [address]  [place] [address]  Phone: [phone]  Fax: [phone]  Follow Up Time: 1 month    Magno Faust)  Family Medicine  [address] [place] [address]  Phone: [phone]  Fax: [phone]  Follow Up Time: 1 week Marvin Casas)  Neurology  [address] [place] [address], [address]  [place] [address]  Phone: [phone]  Fax: [phone]  Follow Up Time: 1 month    Magno Faust)  Family Medicine  [address] [place] [address]  Phone: [phone]  Fax: [phone]  Follow Up Time: 1 week    Christiano Alamo)  Psychiatry  [address] [place] [address]  Phone: [phone]  Fax: [phone]  Follow Up Time: 2 weeks Magno Faust)  Family Medicine  [address] [place] [address]  Phone: [phone]  Fax: [phone]  Follow Up Time: 1 week    Christiano Alamo)  Psychiatry  [address] [place] [address]  Phone: [phone]  Fax: [phone]  Follow Up Time: 2 weeks    Yolette Bhatt  [address] [place] [address]  Phone: [phone]  Fax: (   )    -  Scheduled Appointment: 01/07/2020 02:15 PM

## 2019-12-11 NOTE — DISCHARGE NOTE PROVIDER - NSDCCPCAREPLAN_GEN_ALL_CORE_FT
PRINCIPAL DISCHARGE DIAGNOSIS  Diagnosis: Seizure  Assessment and Plan of Treatment: -   - VEEG performed   - Follow up Outpatient at [facility] on 01/07/2020  - Depakote increased and sent to patient's pharmacy  - Dilantin increased and sent to patient's pharmacy      SECONDARY DISCHARGE DIAGNOSES  Diagnosis: MDD (major depressive disorder)  Assessment and Plan of Treatment: -   - Outpatient follow up with psychiatry   - Phenobarb increased and sent to patient's pharmacy  - Cleared by psychiatry team PRINCIPAL DISCHARGE DIAGNOSIS  Diagnosis: Seizure  Assessment and Plan of Treatment: -   - VEEG performed   - Follow up Outpatient at [facility] on 01/07/2020  - Depakote increased and sent to patient's pharmacy  - Dilantin increased and sent to patient's pharmacy  - Phenobarb increased and sent to patient's pharmacy      SECONDARY DISCHARGE DIAGNOSES  Diagnosis: MDD (major depressive disorder)  Assessment and Plan of Treatment: -   - Outpatient follow up with psychiatry   - Phenobarb increased and sent to patient's pharmacy  - Cleared by psychiatry team

## 2019-12-11 NOTE — DISCHARGE NOTE PROVIDER - CARE PROVIDERS DIRECT ADDRESSES
,[email or web address],[email or web address]

## 2019-12-13 DIAGNOSIS — I10 ESSENTIAL (PRIMARY) HYPERTENSION: ICD-10-CM

## 2019-12-13 DIAGNOSIS — E78.5 HYPERLIPIDEMIA, UNSPECIFIED: ICD-10-CM

## 2019-12-13 DIAGNOSIS — F32.9 MAJOR DEPRESSIVE DISORDER, SINGLE EPISODE, UNSPECIFIED: ICD-10-CM

## 2019-12-13 DIAGNOSIS — G40.909 EPILEPSY, UNSPECIFIED, NOT INTRACTABLE, WITHOUT STATUS EPILEPTICUS: ICD-10-CM

## 2019-12-13 LAB — PHENYTOIN FREE SERPL-MCNC: 1.3 MG/L — SIGNIFICANT CHANGE UP (ref 1–2)

## 2020-01-07 ENCOUNTER — APPOINTMENT (OUTPATIENT)
Dept: NEUROLOGY | Facility: CLINIC | Age: 65
End: 2020-01-07
Payer: MEDICARE

## 2020-01-07 ENCOUNTER — OUTPATIENT (OUTPATIENT)
Dept: OUTPATIENT SERVICES | Facility: HOSPITAL | Age: 65
LOS: 1 days | Discharge: HOME | End: 2020-01-07

## 2020-01-07 VITALS — DIASTOLIC BLOOD PRESSURE: 72 MMHG | SYSTOLIC BLOOD PRESSURE: 116 MMHG | HEART RATE: 77 BPM

## 2020-01-07 DIAGNOSIS — Z86.69 PERSONAL HISTORY OF OTHER DISEASES OF THE NERVOUS SYSTEM AND SENSE ORGANS: ICD-10-CM

## 2020-01-07 DIAGNOSIS — G40.909 EPILEPSY, UNSPECIFIED, NOT INTRACTABLE, WITHOUT STATUS EPILEPTICUS: ICD-10-CM

## 2020-01-07 PROCEDURE — 99213 OFFICE O/P EST LOW 20 MIN: CPT

## 2020-01-07 NOTE — PHYSICAL EXAM
[General Appearance - Alert] : alert [General Appearance - In No Acute Distress] : in no acute distress [General Appearance - Well Nourished] : well nourished [General Appearance - Well-Appearing] : healthy appearing [Oriented To Time, Place, And Person] : oriented to person, place, and time [Person] : oriented to person [Place] : oriented to place [Time] : oriented to time [Short Term Intact] : short term memory intact [Naming Objects] : no difficulty naming common objects [Current Events] : adequate knowledge of current events [Cranial Nerves Optic (II)] : visual acuity intact bilaterally,  visual fields full to confrontation, pupils equal round and reactive to light [Cranial Nerves Oculomotor (III)] : extraocular motion intact [Cranial Nerves Facial (VII)] : face symmetrical [Cranial Nerves Trigeminal (V)] : facial sensation intact symmetrically [Cranial Nerves Vestibulocochlear (VIII)] : hearing was intact bilaterally [Cranial Nerves Glossopharyngeal (IX)] : tongue and palate midline [Cranial Nerves Hypoglossal (XII)] : there was no tongue deviation with protrusion [Cranial Nerves Accessory (XI - Cranial And Spinal)] : head turning and shoulder shrug symmetric [Motor Strength] : muscle strength was normal in all four extremities [Sensation Tactile Decrease] : light touch was intact [2+] : Patella left 2+ [Sclera] : the sclera and conjunctiva were normal [Extraocular Movements] : extraocular movements were intact [PERRL With Normal Accommodation] : pupils were equal in size, round, reactive to light, with normal accommodation [Hearing Threshold Finger Rub Not [place]] : hearing was normal [Neck Appearance] : the appearance of the neck was normal [Respiration, Rhythm And Depth] : normal respiratory rhythm and effort [Auscultation Breath Sounds / Voice Sounds] : lungs were clear to auscultation bilaterally [Heart Rate And Rhythm] : heart rate was normal and rhythm regular [Murmurs] : no murmurs [Heart Sounds] : normal S1 and S2 [Edema] : there was no peripheral edema [Bowel Sounds] : normal bowel sounds [Abdomen Soft] : soft [Abdomen Tenderness] : non-tender [No CVA Tenderness] : no ~M costovertebral angle tenderness [Abnormal Walk] : normal gait [Nail Clubbing] : no clubbing  or cyanosis of the fingernails [Musculoskeletal - Swelling] : no joint swelling seen [Motor Tone] : muscle strength and tone were normal [] : no rash [FreeTextEntry1] : With mild head tremor [Paresis Pronator Drift Right-Sided] : no pronator drift on the right [Paresis Pronator Drift Left-Sided] : no pronator drift on the left

## 2020-01-07 NOTE — REVIEW OF SYSTEMS
[Negative] : Heme/Lymph [Confused or Disoriented] : no confusion [Memory Lapses or Loss] : no memory loss [Decr. Concentrating Ability] : no decrease in concentrating ability [Difficulty with Language] : no ~M difficulty with language [Repeating Questions] : no repeated questioning about recent events [Facial Weakness] : no facial weakness [Arm Weakness] : no arm weakness [Leg Weakness] : no leg weakness [Hand Weakness] : no hand weakness [Poor Coordination] : good coordination [Difficulty Writing] : no difficulty writing [Difficulties in Speech] : no speech difficulties [Numbness] : no numbness [Tingling] : no tingling [Hypersensitivity] : no hypersensitivity [Abnormal Sensation] : no abnormal sensation [Seizures] : no convulsions [Dizziness] : no dizziness [Fainting] : no fainting [Vertigo] : no vertigo [Lightheadedness] : no lightheadedness [Cluster Headache] : no cluster headache [Migraine Headache] : no migraine headache [Tension Headache] : no tension-type headache [Difficulty Walking] : no difficulty walking [Inability to Walk] : able to walk [Frequent Falls] : not falling [Ataxia] : no ataxia [Limping] : not limping

## 2020-01-07 NOTE — END OF VISIT
[] : Resident [FreeTextEntry3] : Patient examined with sister in the room.  Patient and sister were educated on the proper dosing of the seizure meds and were given a handout of the doses and instructions for checking trough levels in 4 weeks.  He will return to clinic in 1-2 months and they will notify us of any breakthrough seizures.  We verified doses of anticonvulsants with pharmacy.

## 2020-01-07 NOTE — HISTORY OF PRESENT ILLNESS
[FreeTextEntry1] : 64 year old male with known  seizure disorder since age 23, presenting for follow up after 24 hour VEEG done in December. Patient has had seizures since last visit discharged on December 11 with increaswed AED doses.\par \par VEEG done in December noted to have found small number of right frontotemproal sharps and sharp transients. Patient who is accompanied by sister who lives with him and they both report no incidence of seizures since hospitalization. \par They are compliant with medications and note no side effects, no problems with sleep. Called pharmacy ensured had correct dose as per last discharge.\par \par Currently \par -Phenytoin 100 mg oral capsule, extended release: 2 cap(s) orally in the morning and 3 tabs orally at bedtime, \par -Depakote  mg oral tablet, extended release: 3 tab(s) orally every 12 hours \par -PHENobarbital 32.4 mg oral tablet one tab in morning and two at night

## 2020-01-07 NOTE — ASSESSMENT
[FreeTextEntry1] : This is a 64 year old male with known seizure disorder presenting to clinic for follow up after 24 hour VEEg.\par \par #Epilepsy\par - VEEG right sided sharps\par - Recently increased AED's to do being uncontrolled on previous regimen, recent hospitalization in December\par - Plan as from last hospitalization to decrease dilantin, currently taking 200 mg in am and 300 mg in evening. Will switch to 200 mg twice a day since plan in epilepsy monitoring unit was to gradually wean patient off of phenytoin.\par - Will repeat levels prior to next appointment\par \par RTC in 1 months\par \par

## 2020-01-13 ENCOUNTER — APPOINTMENT (OUTPATIENT)
Dept: GASTROENTEROLOGY | Facility: CLINIC | Age: 65
End: 2020-01-13

## 2020-01-16 ENCOUNTER — APPOINTMENT (OUTPATIENT)
Dept: UROLOGY | Facility: CLINIC | Age: 65
End: 2020-01-16

## 2020-01-19 ENCOUNTER — EMERGENCY (EMERGENCY)
Facility: HOSPITAL | Age: 65
LOS: 0 days | Discharge: HOME | End: 2020-01-19
Attending: EMERGENCY MEDICINE | Admitting: EMERGENCY MEDICINE
Payer: MEDICARE

## 2020-01-19 VITALS
RESPIRATION RATE: 17 BRPM | SYSTOLIC BLOOD PRESSURE: 105 MMHG | TEMPERATURE: 98 F | HEART RATE: 63 BPM | OXYGEN SATURATION: 100 % | DIASTOLIC BLOOD PRESSURE: 65 MMHG

## 2020-01-19 DIAGNOSIS — R56.9 UNSPECIFIED CONVULSIONS: ICD-10-CM

## 2020-01-19 DIAGNOSIS — Z79.899 OTHER LONG TERM (CURRENT) DRUG THERAPY: ICD-10-CM

## 2020-01-19 DIAGNOSIS — G40.909 EPILEPSY, UNSPECIFIED, NOT INTRACTABLE, WITHOUT STATUS EPILEPTICUS: ICD-10-CM

## 2020-01-19 DIAGNOSIS — I10 ESSENTIAL (PRIMARY) HYPERTENSION: ICD-10-CM

## 2020-01-19 DIAGNOSIS — D64.9 ANEMIA, UNSPECIFIED: ICD-10-CM

## 2020-01-19 LAB
ALBUMIN SERPL ELPH-MCNC: 4.1 G/DL — SIGNIFICANT CHANGE UP (ref 3.5–5.2)
ALP SERPL-CCNC: 54 U/L — SIGNIFICANT CHANGE UP (ref 30–115)
ALT FLD-CCNC: 17 U/L — SIGNIFICANT CHANGE UP (ref 0–41)
ANION GAP SERPL CALC-SCNC: 14 MMOL/L — SIGNIFICANT CHANGE UP (ref 7–14)
AST SERPL-CCNC: 20 U/L — SIGNIFICANT CHANGE UP (ref 0–41)
BASOPHILS # BLD AUTO: 0.07 K/UL — SIGNIFICANT CHANGE UP (ref 0–0.2)
BASOPHILS NFR BLD AUTO: 1.3 % — HIGH (ref 0–1)
BILIRUB SERPL-MCNC: 0.2 MG/DL — SIGNIFICANT CHANGE UP (ref 0.2–1.2)
BUN SERPL-MCNC: 15 MG/DL — SIGNIFICANT CHANGE UP (ref 10–20)
CALCIUM SERPL-MCNC: 9 MG/DL — SIGNIFICANT CHANGE UP (ref 8.5–10.1)
CHLORIDE SERPL-SCNC: 102 MMOL/L — SIGNIFICANT CHANGE UP (ref 98–110)
CO2 SERPL-SCNC: 24 MMOL/L — SIGNIFICANT CHANGE UP (ref 17–32)
CREAT SERPL-MCNC: 1.1 MG/DL — SIGNIFICANT CHANGE UP (ref 0.7–1.5)
EOSINOPHIL # BLD AUTO: 0.31 K/UL — SIGNIFICANT CHANGE UP (ref 0–0.7)
EOSINOPHIL NFR BLD AUTO: 5.6 % — SIGNIFICANT CHANGE UP (ref 0–8)
GLUCOSE SERPL-MCNC: 88 MG/DL — SIGNIFICANT CHANGE UP (ref 70–99)
HCT VFR BLD CALC: 38.3 % — LOW (ref 42–52)
HGB BLD-MCNC: 12.9 G/DL — LOW (ref 14–18)
IMM GRANULOCYTES NFR BLD AUTO: 0.4 % — HIGH (ref 0.1–0.3)
LYMPHOCYTES # BLD AUTO: 2.02 K/UL — SIGNIFICANT CHANGE UP (ref 1.2–3.4)
LYMPHOCYTES # BLD AUTO: 36.7 % — SIGNIFICANT CHANGE UP (ref 20.5–51.1)
MAGNESIUM SERPL-MCNC: 2.1 MG/DL — SIGNIFICANT CHANGE UP (ref 1.8–2.4)
MCHC RBC-ENTMCNC: 32.1 PG — HIGH (ref 27–31)
MCHC RBC-ENTMCNC: 33.7 G/DL — SIGNIFICANT CHANGE UP (ref 32–37)
MCV RBC AUTO: 95.3 FL — HIGH (ref 80–94)
MONOCYTES # BLD AUTO: 0.36 K/UL — SIGNIFICANT CHANGE UP (ref 0.1–0.6)
MONOCYTES NFR BLD AUTO: 6.5 % — SIGNIFICANT CHANGE UP (ref 1.7–9.3)
NEUTROPHILS # BLD AUTO: 2.73 K/UL — SIGNIFICANT CHANGE UP (ref 1.4–6.5)
NEUTROPHILS NFR BLD AUTO: 49.5 % — SIGNIFICANT CHANGE UP (ref 42.2–75.2)
NRBC # BLD: 0 /100 WBCS — SIGNIFICANT CHANGE UP (ref 0–0)
PLATELET # BLD AUTO: 191 K/UL — SIGNIFICANT CHANGE UP (ref 130–400)
POTASSIUM SERPL-MCNC: 4.3 MMOL/L — SIGNIFICANT CHANGE UP (ref 3.5–5)
POTASSIUM SERPL-SCNC: 4.3 MMOL/L — SIGNIFICANT CHANGE UP (ref 3.5–5)
PROT SERPL-MCNC: 6.5 G/DL — SIGNIFICANT CHANGE UP (ref 6–8)
RBC # BLD: 4.02 M/UL — LOW (ref 4.7–6.1)
RBC # FLD: 14.6 % — HIGH (ref 11.5–14.5)
SODIUM SERPL-SCNC: 140 MMOL/L — SIGNIFICANT CHANGE UP (ref 135–146)
TROPONIN T SERPL-MCNC: <0.01 NG/ML — SIGNIFICANT CHANGE UP
WBC # BLD: 5.51 K/UL — SIGNIFICANT CHANGE UP (ref 4.8–10.8)
WBC # FLD AUTO: 5.51 K/UL — SIGNIFICANT CHANGE UP (ref 4.8–10.8)

## 2020-01-19 PROCEDURE — 93010 ELECTROCARDIOGRAM REPORT: CPT

## 2020-01-19 PROCEDURE — 99284 EMERGENCY DEPT VISIT MOD MDM: CPT

## 2020-01-19 RX ORDER — SODIUM CHLORIDE 9 MG/ML
1000 INJECTION INTRAMUSCULAR; INTRAVENOUS; SUBCUTANEOUS ONCE
Refills: 0 | Status: COMPLETED | OUTPATIENT
Start: 2020-01-19 | End: 2020-01-19

## 2020-01-19 RX ADMIN — SODIUM CHLORIDE 1000 MILLILITER(S): 9 INJECTION INTRAMUSCULAR; INTRAVENOUS; SUBCUTANEOUS at 14:58

## 2020-01-19 NOTE — ED ADULT NURSE NOTE - CHIEF COMPLAINT QUOTE
RICHARD from [facility] s/p seizure. As per EMS, pt got news that his nephew passed away in [place] and pt had a seizure. Hx of epilepsy. No injuries, pt was lowered to ground, seizure lasted approx 5 minutes. Pt awake and alert, no distress

## 2020-01-19 NOTE — ED PROVIDER NOTE - OBJECTIVE STATEMENT
64yoM with h/o epilepsy on phenobarb, depakote, and ?dilantin, presents with seizures. Per nephew who cares for pt and administers all daily meds, pt had just heard his mother's son had  and started having a seizure - he stumbled though caught himself a little, and then lowered to floor by nephew and had upper-body convulsions for <45 seconds and then was confused for a while after. Pt recalls feeling dizzy and then about to pass out, denies all other complaints. Per nephew, pt has had many seizures throughout his life, including many being stress-induced, last was 1.5 months ago, and today's episode was one of the most benign and likely 2/2 the stress. They both deny recent fall, head trauma, CP, SOB, fever, vomiting, diarrhea, rectal bleeding, leg pain or swelling, recent long distance travel, and all other symptoms. Has been compliant on all meds including this morning.

## 2020-01-19 NOTE — ED PROVIDER NOTE - PHYSICAL EXAMINATION
Afebrile, hemodynamically stable, saturating well on RA  NAD, well appearing  Head NCAT  PERRL, EOMI grossly, anicteric  MMM  RRR, nml S1/S2, no m/r/g  Lungs CTAB, no w/r/r  Abd soft, NT, ND, nml BS, no rebound or guarding  AAO though mildly slowed affect, CN's 3-12 grossly intact, nml independent gait  MARTINES spontaneously, no leg cyanosis or edema  Skin warm, well perfused, no rashes or hives

## 2020-01-19 NOTE — ED ADULT TRIAGE NOTE - CHIEF COMPLAINT QUOTE
RICHARD from [facility] s/p seizure. As per EMS, pt got news that his nephew passed away in [place] and pt had a seizure. Hx of epilepsy. No injuries, pt was lowered to ground, seizure lasted approx 5 minutes. Pt awake and alert, no distress

## 2020-01-19 NOTE — ED PROVIDER NOTE - NSFOLLOWUPINSTRUCTIONS_ED_ALL_ED_FT
Please follow up with your primary care doctor in 1-2 days.  Please keep well hydrated.  Please return to the emergency department if you more seizures, fever, passing out, or any other symptoms.

## 2020-01-19 NOTE — ED PROVIDER NOTE - PATIENT PORTAL LINK FT
You can access the FollowMyHealth Patient Portal offered by [facility] by registering at the following website: [email or web address]. By joining [employer]’s FollowMyHealth portal, you will also be able to view your health information using other applications (apps) compatible with our system.

## 2020-08-31 NOTE — ED ADULT NURSE NOTE - NS ED NOTE ABUSE RESPONSE YN
Decrease Regimen: Fluocinolone cream 2x a week. Detail Level: Zone Action 1: Continue Otc Regimen: Head and shoulders shampoo Initiate Regimen: Minocycline 50mg for 3 weeks Plan: Pt should try to use all free and clear detergent and fabric softener. Pt will start to use less fluocinolone and none on the face Yes

## 2021-05-25 ENCOUNTER — EMERGENCY (EMERGENCY)
Facility: HOSPITAL | Age: 66
LOS: 0 days | Discharge: HOME | End: 2021-05-25
Attending: EMERGENCY MEDICINE | Admitting: EMERGENCY MEDICINE
Payer: MEDICARE

## 2021-05-25 VITALS
SYSTOLIC BLOOD PRESSURE: 101 MMHG | DIASTOLIC BLOOD PRESSURE: 67 MMHG | OXYGEN SATURATION: 97 % | HEART RATE: 76 BPM | WEIGHT: 160.06 LBS | RESPIRATION RATE: 18 BRPM | TEMPERATURE: 97 F | HEIGHT: 73 IN
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
## 2021-05-25 DIAGNOSIS — Z86.69 PERSONAL HISTORY OF OTHER DISEASES OF THE NERVOUS SYSTEM AND SENSE ORGANS: ICD-10-CM
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
## 2021-05-25 DIAGNOSIS — S39.012A STRAIN OF MUSCLE, FASCIA AND TENDON OF LOWER BACK, INITIAL ENCOUNTER: ICD-10-CM

## 2021-05-25 DIAGNOSIS — M54.5 LOW BACK PAIN: ICD-10-CM

## 2021-05-25 DIAGNOSIS — I10 ESSENTIAL (PRIMARY) HYPERTENSION: ICD-10-CM

## 2021-05-25 PROCEDURE — 99283 EMERGENCY DEPT VISIT LOW MDM: CPT

## 2021-05-25 RX ORDER — IBUPROFEN 200 MG
600 TABLET ORAL ONCE
Refills: 0 | Status: COMPLETED | OUTPATIENT
Start: 2021-05-25 | End: 2021-05-25

## 2021-05-25 RX ORDER — IBUPROFEN 200 MG
1 TABLET ORAL
Qty: 21 | Refills: 0
Start: 2021-05-25 | End: 2021-05-31

## 2021-05-25 RX ADMIN — Medication 600 MILLIGRAM(S): at 08:29

## 2021-05-25 NOTE — ED PROVIDER NOTE - OBJECTIVE STATEMENT
65 y/o M, PMHx Seizure Disorder, presents to the ED with complaints of left lower back discomfort x one week. Patient denies any specific inciting trauma/injury and admits to exacerbation of pain upon movement. He denies radiating pain, lower extremity weakness, gait abnormality, numbness, bowel/bladder incontinence, skin color changes/rash, abdominal pain, urinary symptoms, fever and chills. He has not yet taken anything for his discomfort at home.

## 2021-05-25 NOTE — ED PROVIDER NOTE - ATTENDING CONTRIBUTION TO CARE
65 yo male PMH HTN, seizure disorder c/o non-traumatic left lower back pain since this AM>  Worse with movement, non-radiating and not associated with any acute complaints such as fever, chills, bowel or bladder dysfunction, no abdominal or groin pain, no urinary complaints/diarrhea, focal weakness or paresthesias.   Well-appearing well-nourished, middle age male in  NAD, head AT/NC, PERRL, pink conjunctivae,  mmm, supple neck without midline spine ttp, nml work of breathing, lungs CTA b/l, equal air entry, RRR, well-perfused extremities, distal pulses intact, abdomen soft, NT/ND, BS present in all quadrants, no midline spine or CVA ttp, no leg edema or unilateral calf swelling, + mild paraspinal ttp of the left lower lumbar muscles, A&Ox3, no focal neuro deficits, nml straight leg raise, nml dorsiflexion of b/l great toes, nml mood and affect.  Imp; acute myofascial strain.  pain meds and doses discussed , advised to follow up with PMD, strict return precautions given,  Patient and family verbalized understanding and are amenable with the plan.

## 2021-05-25 NOTE — ED PROVIDER NOTE - MUSCULOSKELETAL, MLM
+ left lumbar para-vertebral tenderness without spinous process tenderness; spine appears normal, no joint tenderness

## 2021-05-25 NOTE — ED PROVIDER NOTE - PATIENT PORTAL LINK FT
You can access the FollowMyHealth Patient Portal offered by [facility] by registering at the following website: [email or web address]. By joining [employer]’s FollowMyHealth portal, you will also be able to view your health information using other applications (apps) compatible with our system.

## 2021-05-25 NOTE — ED PROVIDER NOTE - NSFOLLOWUPCLINICS_GEN_ALL_ED_FT
[At Term] : at term [[place] : in [place] [Normal Vaginal Route] : by normal vaginal route [facility] ([facility] [place])  Rehabilitation  [facility] [address], [address] [place] [address]  Phone: [phone]  Fax:   Follow Up Time: 1-3 Days

## 2021-05-25 NOTE — ED PROVIDER NOTE - CLINICAL SUMMARY MEDICAL DECISION MAKING FREE TEXT BOX
67 yo male PMH HTN, seizure disorder c/o non-traumatic left lower back pain since this AM>  Worse with movement, non-radiating and not associated with any acute complaints such as fever, chills, bowel or bladder dysfunction, no abdominal or groin pain, no urinary complaints/diarrhea, focal weakness or paresthesias.   Well-appearing well-nourished, middle age male in  NAD, head AT/NC, PERRL, pink conjunctivae,  mmm, supple neck without midline spine ttp, nml work of breathing, lungs CTA b/l, equal air entry, RRR, well-perfused extremities, distal pulses intact, abdomen soft, NT/ND, BS present in all quadrants, no midline spine or CVA ttp, no leg edema or unilateral calf swelling, + mild paraspinal ttp of the left lower lumbar muscles, A&Ox3, no focal neuro deficits, nml straight leg raise, nml dorsiflexion of b/l great toes, nml mood and affect.  Imp; acute myofascial strain.  pain meds and doses discussed , advised to follow up with PMD, strict return precautions given,  Patient and family verbalized understanding and are amenable with the plan.

## 2021-05-25 NOTE — ED ADULT TRIAGE NOTE - CHIEF COMPLAINT QUOTE
Patient c/o left lower back pain since this morning. Denies any recent trauma or heavy lifting EMS states patient able to ambulate

## 2021-05-25 NOTE — ED PROVIDER NOTE - NEURO NEGATIVE STATEMENT, MLM
+ hx of seizure d/o; no loss of consciousness, no gait abnormality, no headache, no sensory deficits, and no weakness.

## 2022-01-13 ENCOUNTER — EMERGENCY (EMERGENCY)
Facility: HOSPITAL | Age: 67
LOS: 0 days | Discharge: HOME | End: 2022-01-13
Attending: STUDENT IN AN ORGANIZED HEALTH CARE EDUCATION/TRAINING PROGRAM | Admitting: STUDENT IN AN ORGANIZED HEALTH CARE EDUCATION/TRAINING PROGRAM
Payer: MEDICARE

## 2022-01-13 VITALS
HEART RATE: 79 BPM | SYSTOLIC BLOOD PRESSURE: 101 MMHG | HEIGHT: 73 IN | TEMPERATURE: 98 F | RESPIRATION RATE: 18 BRPM | DIASTOLIC BLOOD PRESSURE: 55 MMHG | OXYGEN SATURATION: 100 %

## 2022-01-13 DIAGNOSIS — M79.662 PAIN IN LEFT LOWER LEG: ICD-10-CM

## 2022-01-13 DIAGNOSIS — I10 ESSENTIAL (PRIMARY) HYPERTENSION: ICD-10-CM

## 2022-01-13 DIAGNOSIS — M25.562 PAIN IN LEFT KNEE: ICD-10-CM

## 2022-01-13 DIAGNOSIS — M79.89 OTHER SPECIFIED SOFT TISSUE DISORDERS: ICD-10-CM

## 2022-01-13 PROCEDURE — 99284 EMERGENCY DEPT VISIT MOD MDM: CPT | Mod: FS

## 2022-01-13 PROCEDURE — 93971 EXTREMITY STUDY: CPT | Mod: 26,LT

## 2022-01-13 RX ORDER — IBUPROFEN 200 MG
600 TABLET ORAL ONCE
Refills: 0 | Status: COMPLETED | OUTPATIENT
Start: 2022-01-13 | End: 2022-01-13

## 2022-01-13 RX ADMIN — Medication 600 MILLIGRAM(S): at 19:03

## 2022-01-13 NOTE — ED PROVIDER NOTE - PATIENT PORTAL LINK FT
You can access the FollowMyHealth Patient Portal offered by [facility] by registering at the following website: [email or web address]. By joining [employer]’s FollowMyHealth portal, you will also be able to view your health information using other applications (apps) compatible with our system.

## 2022-01-13 NOTE — ED ADULT TRIAGE NOTE - SPO2 (%)
Alert-The patient is alert, awake and responds to voice. The patient is oriented to time, place, and person. The triage nurse is able to obtain subjective information.
100

## 2022-01-13 NOTE — ED PROVIDER NOTE - NS ED ROS FT
Constitutional: (-) fever (-) malaise (-) diaphoresis (-) chills (-) wt. loss (-) bodyaches (-) night sweats  Cardiac: (-) chest pain  (-) palpitations (-) syncope (-) edema  Respiratory: (-) cough (-) SOB (-) AREVALO  GI: (-) nausea (-) vomiting   MS: (+)L posterior knee pain and swelling  Skin: (-) rash (-) laceration    Except as documented in the HPI, all other systems are negative.

## 2022-01-13 NOTE — ED PROVIDER NOTE - NSFOLLOWUPINSTRUCTIONS_ED_ALL_ED_FT
**follow up with orthopedic doctor within 1-3 days**    Baker Cyst  A Baker cyst, also called a popliteal cyst, is a sac-like growth that forms at the back of the knee. The cyst forms when the fluid-filled sac (bursa) that cushions the knee joint becomes enlarged. The bursa that becomes a Baker cyst is located at the back of the knee joint.    What are the causes?  In most cases, a Baker cyst results from another knee problem that causes swelling inside the knee. This makes the fluid inside the knee joint (synovial fluid) flow into the bursa behind the knee, causing the bursa to enlarge.    What increases the risk?  You may be more likely to develop a Baker cyst if you already have a knee problem, such as:  A tear in cartilage that cushions the knee joint (meniscal tear).  A tear in the tissues that connect the bones of the knee joint (ligament tear).  Knee swelling from osteoarthritis, rheumatoid arthritis, or gout.  What are the signs or symptoms?  A Baker cyst does not always cause symptoms. A lump behind the knee may be the only sign of the condition. The lump may be painful, especially when the knee is straightened. If the lump is painful, the pain may come and go. The knee may also be stiff.    Symptoms may quickly get more severe if the cyst breaks open (ruptures). If your cyst ruptures, signs and symptoms may affect the knee and the back of the lower leg (calf) and may include:  Sudden or worsening pain.  Swelling.  Bruising.  How is this diagnosed?  This condition may be diagnosed based on your symptoms and medical history. Your health care provider will also do a physical exam. This may include:  Feeling the cyst to check whether it is tender.  Checking your knee for signs of another knee condition that causes swelling.  You may have imaging tests, such as:  X-rays.  MRI.  Ultrasound.  How is this treated?  A Baker cyst that is not painful may go away without treatment. If the cyst gets large or painful, it will likely get better if the underlying knee problem is treated.    Treatment for a Baker cyst may include:  Resting.  Keeping weight off of the knee. This means not leaning on the knee to support your body weight.  NSAIDs to reduce pain and swelling.  A procedure to drain the fluid from the cyst with a needle (aspiration). You may also get an injection of a medicine that reduces swelling (steroid).  Surgery. This may be needed if other treatments do not work. This usually involves correcting knee damage and removing the cyst.  Follow these instructions at home:  Image   Take over-the-counter and prescription medicines only as told by your health care provider.  Rest and return to your normal activities as told by your health care provider. Avoid activities that make pain or swelling worse. Ask your health care provider what activities are safe for you.  Keep all follow-up visits as told by your health care provider. This is important.  Contact a health care provider if:  You have knee pain, stiffness, or swelling that does not get better.  Get help right away if:  You have sudden or worsening pain and swelling in your calf area.  This information is not intended to replace advice given to you by your health care provider. Make sure you discuss any questions you have with your health care provider.

## 2022-01-13 NOTE — ED PROVIDER NOTE - PHYSICAL EXAMINATION
GENERAL: Well-nourished, Well-developed. NAD.  HEAD: No visible or palpable bumps or hematomas. No ecchymosis behind ears B/L.  Eyes: PERRLA, EOMI. No asymmetry. No nystagmus. No conjunctival injection. Non-icteric sclera.  CVS: RRR  MSK: Extremities w/o deformity or ttp. (+)L posterior knee swelling and ttp. FROM of upper and lower extremities B/L.   Skin: Warm, Dry. No rashes or lesions. Good cap refill < 2 sec B/L.  EXT: Radial and pedal pulses present B/L. No calf tenderness or swelling B/L. No palpable cords. No pedal edema B/L.

## 2022-01-13 NOTE — ED PROVIDER NOTE - ATTENDING CONTRIBUTION TO CARE
65 yo m hx htn, sz  pt presents for 1 day of posterior L knee pain.  + swelling. pain worse w/ movement and palpation. no numbness/tingling/calf pain. no trauma. no fever/chills/bug bites      vss  gen- NAD, aaox3  card-rrr  lungs-ctab, no wheezing or rhonchi  neuro- full str/sensation, cn ii-xii grossly intact, normal coordination and gait  L knee- mild tenderness behind L knee, no joint laxity, FROM to knee, sensation intact, no calf tenderness    likely baker cyst, r/o dvt  duplex, nsaids  no trauma, no need for XR at this point

## 2022-01-13 NOTE — ED PROVIDER NOTE - OBJECTIVE STATEMENT
65 yo M pmhx HTN, seizures presenting to the ED for evaluation of L posterior knee pain and swelling x few hours, pain is constant, mild, achy. Denies any alleviating factors. Pain worse with movement and palpation. Denies any trauma. Denies fever, chills, numbness/tingling, chest pain, sob.

## 2022-01-13 NOTE — ED PROVIDER NOTE - CARE PROVIDER_API CALL
Jonnathan Mar)  Orthopaedic Surgery  [address] [place] [address]  Phone: [phone]  Fax: [phone]  Follow Up Time: 1-3 Days

## 2022-04-01 ENCOUNTER — INPATIENT (INPATIENT)
Facility: HOSPITAL | Age: 67
LOS: 2 days | Discharge: HOME | End: 2022-04-04
Attending: SURGERY | Admitting: SURGERY
Payer: MEDICARE

## 2022-04-01 VITALS
TEMPERATURE: 98 F | OXYGEN SATURATION: 100 % | DIASTOLIC BLOOD PRESSURE: 54 MMHG | HEIGHT: 73 IN | HEART RATE: 88 BPM | SYSTOLIC BLOOD PRESSURE: 103 MMHG | WEIGHT: 179.9 LBS | RESPIRATION RATE: 18 BRPM

## 2022-04-01 LAB
ALBUMIN SERPL ELPH-MCNC: 4 G/DL — SIGNIFICANT CHANGE UP (ref 3.5–5.2)
ALP SERPL-CCNC: 76 U/L — SIGNIFICANT CHANGE UP (ref 30–115)
ALT FLD-CCNC: 26 U/L — SIGNIFICANT CHANGE UP (ref 0–41)
ANION GAP SERPL CALC-SCNC: 11 MMOL/L — SIGNIFICANT CHANGE UP (ref 7–14)
APTT BLD: 114 SEC — CRITICAL HIGH (ref 27–39.2)
APTT BLD: 118.7 SEC — CRITICAL HIGH (ref 27–39.2)
APTT BLD: 39.3 SEC — HIGH (ref 27–39.2)
AST SERPL-CCNC: 20 U/L — SIGNIFICANT CHANGE UP (ref 0–41)
BASOPHILS # BLD AUTO: 0.05 K/UL — SIGNIFICANT CHANGE UP (ref 0–0.2)
BASOPHILS NFR BLD AUTO: 0.6 % — SIGNIFICANT CHANGE UP (ref 0–1)
BILIRUB SERPL-MCNC: 0.3 MG/DL — SIGNIFICANT CHANGE UP (ref 0.2–1.2)
BUN SERPL-MCNC: 23 MG/DL — HIGH (ref 10–20)
CALCIUM SERPL-MCNC: 8.5 MG/DL — SIGNIFICANT CHANGE UP (ref 8.5–10.1)
CHLORIDE SERPL-SCNC: 106 MMOL/L — SIGNIFICANT CHANGE UP (ref 98–110)
CO2 SERPL-SCNC: 25 MMOL/L — SIGNIFICANT CHANGE UP (ref 17–32)
CREAT SERPL-MCNC: 0.8 MG/DL — SIGNIFICANT CHANGE UP (ref 0.7–1.5)
EGFR: 99 ML/MIN/1.73M2 — SIGNIFICANT CHANGE UP
EOSINOPHIL # BLD AUTO: 0.54 K/UL — SIGNIFICANT CHANGE UP (ref 0–0.7)
EOSINOPHIL NFR BLD AUTO: 6.2 % — SIGNIFICANT CHANGE UP (ref 0–8)
GLUCOSE SERPL-MCNC: 92 MG/DL — SIGNIFICANT CHANGE UP (ref 70–99)
HCT VFR BLD CALC: 35.8 % — LOW (ref 42–52)
HCT VFR BLD CALC: 39 % — LOW (ref 42–52)
HGB BLD-MCNC: 11.9 G/DL — LOW (ref 14–18)
HGB BLD-MCNC: 12.8 G/DL — LOW (ref 14–18)
IMM GRANULOCYTES NFR BLD AUTO: 0.6 % — HIGH (ref 0.1–0.3)
INR BLD: 1 RATIO — SIGNIFICANT CHANGE UP (ref 0.65–1.3)
INR BLD: 1.02 RATIO — SIGNIFICANT CHANGE UP (ref 0.65–1.3)
LYMPHOCYTES # BLD AUTO: 2.16 K/UL — SIGNIFICANT CHANGE UP (ref 1.2–3.4)
LYMPHOCYTES # BLD AUTO: 24.7 % — SIGNIFICANT CHANGE UP (ref 20.5–51.1)
MAGNESIUM SERPL-MCNC: 2.2 MG/DL — SIGNIFICANT CHANGE UP (ref 1.8–2.4)
MCHC RBC-ENTMCNC: 30.2 PG — SIGNIFICANT CHANGE UP (ref 27–31)
MCHC RBC-ENTMCNC: 30.7 PG — SIGNIFICANT CHANGE UP (ref 27–31)
MCHC RBC-ENTMCNC: 32.8 G/DL — SIGNIFICANT CHANGE UP (ref 32–37)
MCHC RBC-ENTMCNC: 33.2 G/DL — SIGNIFICANT CHANGE UP (ref 32–37)
MCV RBC AUTO: 92 FL — SIGNIFICANT CHANGE UP (ref 80–94)
MCV RBC AUTO: 92.3 FL — SIGNIFICANT CHANGE UP (ref 80–94)
MONOCYTES # BLD AUTO: 0.7 K/UL — HIGH (ref 0.1–0.6)
MONOCYTES NFR BLD AUTO: 8 % — SIGNIFICANT CHANGE UP (ref 1.7–9.3)
NEUTROPHILS # BLD AUTO: 5.23 K/UL — SIGNIFICANT CHANGE UP (ref 1.4–6.5)
NEUTROPHILS NFR BLD AUTO: 59.9 % — SIGNIFICANT CHANGE UP (ref 42.2–75.2)
NRBC # BLD: 0 /100 WBCS — SIGNIFICANT CHANGE UP (ref 0–0)
NRBC # BLD: 0 /100 WBCS — SIGNIFICANT CHANGE UP (ref 0–0)
PHOSPHATE SERPL-MCNC: 4.1 MG/DL — SIGNIFICANT CHANGE UP (ref 2.1–4.9)
PLATELET # BLD AUTO: 143 K/UL — SIGNIFICANT CHANGE UP (ref 130–400)
PLATELET # BLD AUTO: 149 K/UL — SIGNIFICANT CHANGE UP (ref 130–400)
POTASSIUM SERPL-MCNC: 4.4 MMOL/L — SIGNIFICANT CHANGE UP (ref 3.5–5)
POTASSIUM SERPL-SCNC: 4.4 MMOL/L — SIGNIFICANT CHANGE UP (ref 3.5–5)
PROT SERPL-MCNC: 6.6 G/DL — SIGNIFICANT CHANGE UP (ref 6–8)
PROTHROM AB SERPL-ACNC: 11.5 SEC — SIGNIFICANT CHANGE UP (ref 9.95–12.87)
PROTHROM AB SERPL-ACNC: 11.7 SEC — SIGNIFICANT CHANGE UP (ref 9.95–12.87)
RBC # BLD: 3.88 M/UL — LOW (ref 4.7–6.1)
RBC # BLD: 4.24 M/UL — LOW (ref 4.7–6.1)
RBC # FLD: 13.5 % — SIGNIFICANT CHANGE UP (ref 11.5–14.5)
RBC # FLD: 13.6 % — SIGNIFICANT CHANGE UP (ref 11.5–14.5)
SARS-COV-2 RNA SPEC QL NAA+PROBE: SIGNIFICANT CHANGE UP
SODIUM SERPL-SCNC: 142 MMOL/L — SIGNIFICANT CHANGE UP (ref 135–146)
WBC # BLD: 7.79 K/UL — SIGNIFICANT CHANGE UP (ref 4.8–10.8)
WBC # BLD: 8.73 K/UL — SIGNIFICANT CHANGE UP (ref 4.8–10.8)
WBC # FLD AUTO: 7.79 K/UL — SIGNIFICANT CHANGE UP (ref 4.8–10.8)
WBC # FLD AUTO: 8.73 K/UL — SIGNIFICANT CHANGE UP (ref 4.8–10.8)

## 2022-04-01 PROCEDURE — 93970 EXTREMITY STUDY: CPT | Mod: 26

## 2022-04-01 PROCEDURE — 99284 EMERGENCY DEPT VISIT MOD MDM: CPT | Mod: FS

## 2022-04-01 RX ORDER — HEPARIN SODIUM 5000 [USP'U]/ML
6500 INJECTION INTRAVENOUS; SUBCUTANEOUS ONCE
Refills: 0 | Status: COMPLETED | OUTPATIENT
Start: 2022-04-01 | End: 2022-04-01

## 2022-04-01 RX ORDER — DIVALPROEX SODIUM 500 MG/1
250 TABLET, DELAYED RELEASE ORAL
Refills: 0 | Status: DISCONTINUED | OUTPATIENT
Start: 2022-04-01 | End: 2022-04-02

## 2022-04-01 RX ORDER — HEPARIN SODIUM 5000 [USP'U]/ML
1300 INJECTION INTRAVENOUS; SUBCUTANEOUS
Qty: 25000 | Refills: 0 | Status: DISCONTINUED | OUTPATIENT
Start: 2022-04-01 | End: 2022-04-02

## 2022-04-01 RX ORDER — HEPARIN SODIUM 5000 [USP'U]/ML
6500 INJECTION INTRAVENOUS; SUBCUTANEOUS EVERY 6 HOURS
Refills: 0 | Status: DISCONTINUED | OUTPATIENT
Start: 2022-04-01 | End: 2022-04-04

## 2022-04-01 RX ORDER — HEPARIN SODIUM 5000 [USP'U]/ML
3000 INJECTION INTRAVENOUS; SUBCUTANEOUS EVERY 6 HOURS
Refills: 0 | Status: DISCONTINUED | OUTPATIENT
Start: 2022-04-01 | End: 2022-04-04

## 2022-04-01 RX ORDER — HEPARIN SODIUM 5000 [USP'U]/ML
INJECTION INTRAVENOUS; SUBCUTANEOUS
Qty: 25000 | Refills: 0 | Status: DISCONTINUED | OUTPATIENT
Start: 2022-04-01 | End: 2022-04-01

## 2022-04-01 RX ORDER — ACETAMINOPHEN 500 MG
650 TABLET ORAL ONCE
Refills: 0 | Status: COMPLETED | OUTPATIENT
Start: 2022-04-01 | End: 2022-04-01

## 2022-04-01 RX ORDER — ATORVASTATIN CALCIUM 80 MG/1
20 TABLET, FILM COATED ORAL AT BEDTIME
Refills: 0 | Status: DISCONTINUED | OUTPATIENT
Start: 2022-04-01 | End: 2022-04-04

## 2022-04-01 RX ORDER — ACETAMINOPHEN 500 MG
650 TABLET ORAL EVERY 6 HOURS
Refills: 0 | Status: DISCONTINUED | OUTPATIENT
Start: 2022-04-01 | End: 2022-04-04

## 2022-04-01 RX ADMIN — Medication 100 MILLIGRAM(S): at 17:23

## 2022-04-01 RX ADMIN — DIVALPROEX SODIUM 250 MILLIGRAM(S): 500 TABLET, DELAYED RELEASE ORAL at 17:23

## 2022-04-01 RX ADMIN — HEPARIN SODIUM 13 UNIT(S)/HR: 5000 INJECTION INTRAVENOUS; SUBCUTANEOUS at 20:53

## 2022-04-01 RX ADMIN — HEPARIN SODIUM 1500 UNIT(S)/HR: 5000 INJECTION INTRAVENOUS; SUBCUTANEOUS at 12:47

## 2022-04-01 RX ADMIN — Medication 650 MILLIGRAM(S): at 17:27

## 2022-04-01 RX ADMIN — Medication 650 MILLIGRAM(S): at 17:23

## 2022-04-01 RX ADMIN — Medication 650 MILLIGRAM(S): at 06:07

## 2022-04-01 RX ADMIN — HEPARIN SODIUM 6500 UNIT(S): 5000 INJECTION INTRAVENOUS; SUBCUTANEOUS at 12:48

## 2022-04-01 RX ADMIN — ATORVASTATIN CALCIUM 20 MILLIGRAM(S): 80 TABLET, FILM COATED ORAL at 22:46

## 2022-04-01 RX ADMIN — Medication 650 MILLIGRAM(S): at 07:00

## 2022-04-01 NOTE — ED PROVIDER NOTE - OBJECTIVE STATEMENT
66 yold male to Ed Pmhx Seizure disorder(last seizure few yrs ago), Htn c/o left leg and posterior knee pain x 1 days; pt with similar pains 1/22 - evaluated for same had duplex doppler neg for dvt;  pt denies new trauma or precipatating event; pt also c/o mild left side buttock and posterior left leg pain;

## 2022-04-01 NOTE — ED PROVIDER NOTE - NS ED ROS FT
Constitutional: (-) fever  Cardiovascular: (-) syncope  Integumentary: (-) rash  Musculoskeltal: Left leg pain  Neurological: (-) altered mental status

## 2022-04-01 NOTE — ED PROVIDER NOTE - PROGRESS NOTE DETAILS
TA: Patient seen by vascular- stated likely will intervene and to admit under Dr. Rodriguez Cartilage Graft Text: The defect edges were debeveled with a #15 scalpel blade.  Given the location of the defect, shape of the defect, the fact the defect involved a full thickness cartilage defect a cartilage graft was deemed most appropriate.  An appropriate donor site was identified, cleansed, and anesthetized. The cartilage graft was then harvested and transferred to the recipient site, oriented appropriately and then sutured into place.  The secondary defect was then repaired using a primary closure.

## 2022-04-01 NOTE — ED PROVIDER NOTE - CLINICAL SUMMARY MEDICAL DECISION MAKING FREE TEXT BOX
66-year-old male presents to the ED with left lower extremity pain x1 day.  Vitals reviewed by me noted to be wnl.  Physical exam revealed mild left lower extremity swelling.  Concern for DVT so we obtained labs, DVT study.  Labs reviewed by me, values noted to be within normal limits.  Preliminary DVT study revealed a DVT in the left common femoral, popliteal, and peroneal.  Case discussed with vascular with recommendations for heparin admission to vascular service.

## 2022-04-01 NOTE — H&P ADULT - HISTORY OF PRESENT ILLNESS
Patient is a 66 year old male with PMHx of seizure disorder (last seizure a few years ago) presents to the ED with left leg pain for one day. Pt reports mild pain left buttock and posterior left leg pain. Pt reports similar pain in January 2022, lower extremity duplex at that time was negative. Pt ambulates without difficulty. Denies any recent traveling or long car rides. No history or PMHx of clots. Denies CP, SOB, abdominal pain, HA, weakness.

## 2022-04-01 NOTE — ED ADULT NURSE NOTE - DATE OF LAST VACCINATION
Quality 402: Tobacco Use And Help With Quitting Among Adolescents: Patient screened for tobacco and never smoked Quality 130: Documentation Of Current Medications In The Medical Record: Current Medications Documented Detail Level: Detailed Quality 226: Preventive Care And Screening: Tobacco Use: Screening And Cessation Intervention: Patient screened for tobacco use and is an ex/non-smoker 01-Jan-2022

## 2022-04-01 NOTE — ED PROVIDER NOTE - PHYSICAL EXAMINATION
Vital Signs: I have reviewed the initial vital signs.  Constitutional: well-nourished, appears stated age, no acute distress  Cardiovascular: regular rate, regular rhythm, well-perfused extremities  Respiratory: unlabored respiratory effort, clear to auscultation bilaterally  Musculoskeletal; Pelvis stable; + mild paraveterbral tenderness left lumbar area, Left buttock and posterior femur area; + swelling noted to left leg compared to right with fullness at popliteal area; pulses and sensation intact;

## 2022-04-01 NOTE — H&P ADULT - NSHPLABSRESULTS_GEN_ALL_CORE
LAB/STUDIES:                        12.8   8.73  )-----------( 149      ( 01 Apr 2022 09:10 )             39.0     04-01    142  |  106  |  23<H>  ----------------------------<  92  4.4   |  25  |  0.8    Ca    8.5      01 Apr 2022 09:10  Phos  4.1     04-01  Mg     2.2     04-01    TPro  6.6  /  Alb  4.0  /  TBili  0.3  /  DBili  x   /  AST  20  /  ALT  26  /  AlkPhos  76  04-01    PT/INR - ( 01 Apr 2022 10:28 )   PT: 11.50 sec;   INR: 1.00 ratio    PTT - ( 01 Apr 2022 10:28 )  PTT:39.3 sec  LIVER FUNCTIONS - ( 01 Apr 2022 09:10 )  Alb: 4.0 g/dL / Pro: 6.6 g/dL / ALK PHOS: 76 U/L / ALT: 26 U/L / AST: 20 U/L / GGT: x           IMAGING:

## 2022-04-01 NOTE — ED ADULT NURSE NOTE - NSICDXNOPASTSURGICALHX_GEN_ALL_CORE
<-- Click to add NO significant Past Surgical History POST-OP DIAGNOSIS:  Inguinal hernia 06-Feb-2020 15:01:14 bilateral Igor Monahan

## 2022-04-01 NOTE — ED ADULT NURSE REASSESSMENT NOTE - NS ED NURSE REASSESS COMMENT FT1
Pt alert and oriented x3. airway patent with neg respiratory distress. arom x 4 extrem. Pt c.o his left IV placement and was advised that another IV would need to be placed due to Right IV infusing continuous medication. Pt chose a cold pack to make him more comfortable. Will reevaluate.

## 2022-04-01 NOTE — H&P ADULT - NSHPPHYSICALEXAM_GEN_ALL_CORE
General: NAD  HEENT: NCAT, EOMI	  Neck: Supple  Cardiovascular: S1 S2   Respiratory: Bilateral breath sounds, normal respiratory effort 	  Gastrointestinal:  Soft, Non-tender, nondistended   Skin: No jaundice   Extremities: Normal range of motion, LLE edema

## 2022-04-01 NOTE — H&P ADULT - ASSESSMENT
ASSESSMENT:   Patient is a 66 year old male with PMHx of seizure disorder (last seizure a few years ago) presents to the ED with left leg pain for one day. Pt reports mild pain left buttock and posterior left leg pain. Pt reports similar pain in January 2022, lower extremity duplex at that time was negative. Pt ambulates without difficulty. Denies any recent traveling or long car rides. No history or PMHx of clots.     PLAN:   - Admit to vascular surgery under Dr. Rodriguez  - Vascular intervention during this admission   - Heparin drip   - Hem consult for unprovoked DVT  - Plan to be discussed with Attending, Dr. Rodriguez    VASCULAR TEAM SPECTRA: [id number]

## 2022-04-01 NOTE — PATIENT PROFILE ADULT - FALL HARM RISK - HARM RISK INTERVENTIONS

## 2022-04-01 NOTE — ED PROVIDER NOTE - NS ED ATTENDING STATEMENT MOD
This was a shared visit with the [facility]. I reviewed and verified the documentation and independently performed the documented:

## 2022-04-02 LAB
ANION GAP SERPL CALC-SCNC: 11 MMOL/L — SIGNIFICANT CHANGE UP (ref 7–14)
ANION GAP SERPL CALC-SCNC: 9 MMOL/L — SIGNIFICANT CHANGE UP (ref 7–14)
APTT BLD: 105.5 SEC — CRITICAL HIGH (ref 27–39.2)
APTT BLD: 46.8 SEC — HIGH (ref 27–39.2)
APTT BLD: 58.2 SEC — HIGH (ref 27–39.2)
BASOPHILS # BLD AUTO: 0.06 K/UL — SIGNIFICANT CHANGE UP (ref 0–0.2)
BASOPHILS NFR BLD AUTO: 0.8 % — SIGNIFICANT CHANGE UP (ref 0–1)
BUN SERPL-MCNC: 20 MG/DL — SIGNIFICANT CHANGE UP (ref 10–20)
BUN SERPL-MCNC: 20 MG/DL — SIGNIFICANT CHANGE UP (ref 10–20)
CALCIUM SERPL-MCNC: 8.2 MG/DL — LOW (ref 8.5–10.1)
CALCIUM SERPL-MCNC: 8.4 MG/DL — LOW (ref 8.5–10.1)
CHLORIDE SERPL-SCNC: 104 MMOL/L — SIGNIFICANT CHANGE UP (ref 98–110)
CHLORIDE SERPL-SCNC: 106 MMOL/L — SIGNIFICANT CHANGE UP (ref 98–110)
CO2 SERPL-SCNC: 22 MMOL/L — SIGNIFICANT CHANGE UP (ref 17–32)
CO2 SERPL-SCNC: 25 MMOL/L — SIGNIFICANT CHANGE UP (ref 17–32)
CREAT SERPL-MCNC: 0.8 MG/DL — SIGNIFICANT CHANGE UP (ref 0.7–1.5)
CREAT SERPL-MCNC: 0.9 MG/DL — SIGNIFICANT CHANGE UP (ref 0.7–1.5)
EGFR: 95 ML/MIN/1.73M2 — SIGNIFICANT CHANGE UP
EGFR: 99 ML/MIN/1.73M2 — SIGNIFICANT CHANGE UP
EOSINOPHIL # BLD AUTO: 0.48 K/UL — SIGNIFICANT CHANGE UP (ref 0–0.7)
EOSINOPHIL NFR BLD AUTO: 6.8 % — SIGNIFICANT CHANGE UP (ref 0–8)
GLUCOSE SERPL-MCNC: 92 MG/DL — SIGNIFICANT CHANGE UP (ref 70–99)
GLUCOSE SERPL-MCNC: 99 MG/DL — SIGNIFICANT CHANGE UP (ref 70–99)
HCT VFR BLD CALC: 35.4 % — LOW (ref 42–52)
HCT VFR BLD CALC: 36.3 % — LOW (ref 42–52)
HGB BLD-MCNC: 11.9 G/DL — LOW (ref 14–18)
HGB BLD-MCNC: 12.3 G/DL — LOW (ref 14–18)
IMM GRANULOCYTES NFR BLD AUTO: 0.6 % — HIGH (ref 0.1–0.3)
INR BLD: 1.01 RATIO — SIGNIFICANT CHANGE UP (ref 0.65–1.3)
LYMPHOCYTES # BLD AUTO: 2.33 K/UL — SIGNIFICANT CHANGE UP (ref 1.2–3.4)
LYMPHOCYTES # BLD AUTO: 33 % — SIGNIFICANT CHANGE UP (ref 20.5–51.1)
MAGNESIUM SERPL-MCNC: 2.2 MG/DL — SIGNIFICANT CHANGE UP (ref 1.8–2.4)
MCHC RBC-ENTMCNC: 30.4 PG — SIGNIFICANT CHANGE UP (ref 27–31)
MCHC RBC-ENTMCNC: 30.6 PG — SIGNIFICANT CHANGE UP (ref 27–31)
MCHC RBC-ENTMCNC: 33.6 G/DL — SIGNIFICANT CHANGE UP (ref 32–37)
MCHC RBC-ENTMCNC: 33.9 G/DL — SIGNIFICANT CHANGE UP (ref 32–37)
MCV RBC AUTO: 89.6 FL — SIGNIFICANT CHANGE UP (ref 80–94)
MCV RBC AUTO: 91 FL — SIGNIFICANT CHANGE UP (ref 80–94)
MONOCYTES # BLD AUTO: 0.63 K/UL — HIGH (ref 0.1–0.6)
MONOCYTES NFR BLD AUTO: 8.9 % — SIGNIFICANT CHANGE UP (ref 1.7–9.3)
NEUTROPHILS # BLD AUTO: 3.53 K/UL — SIGNIFICANT CHANGE UP (ref 1.4–6.5)
NEUTROPHILS NFR BLD AUTO: 49.9 % — SIGNIFICANT CHANGE UP (ref 42.2–75.2)
NRBC # BLD: 0 /100 WBCS — SIGNIFICANT CHANGE UP (ref 0–0)
NRBC # BLD: 0 /100 WBCS — SIGNIFICANT CHANGE UP (ref 0–0)
PHOSPHATE SERPL-MCNC: 3.5 MG/DL — SIGNIFICANT CHANGE UP (ref 2.1–4.9)
PLATELET # BLD AUTO: 153 K/UL — SIGNIFICANT CHANGE UP (ref 130–400)
PLATELET # BLD AUTO: 160 K/UL — SIGNIFICANT CHANGE UP (ref 130–400)
POTASSIUM SERPL-MCNC: 4.2 MMOL/L — SIGNIFICANT CHANGE UP (ref 3.5–5)
POTASSIUM SERPL-MCNC: 4.3 MMOL/L — SIGNIFICANT CHANGE UP (ref 3.5–5)
POTASSIUM SERPL-SCNC: 4.2 MMOL/L — SIGNIFICANT CHANGE UP (ref 3.5–5)
POTASSIUM SERPL-SCNC: 4.3 MMOL/L — SIGNIFICANT CHANGE UP (ref 3.5–5)
PROTHROM AB SERPL-ACNC: 11.6 SEC — SIGNIFICANT CHANGE UP (ref 9.95–12.87)
RBC # BLD: 3.89 M/UL — LOW (ref 4.7–6.1)
RBC # BLD: 4.05 M/UL — LOW (ref 4.7–6.1)
RBC # FLD: 13.3 % — SIGNIFICANT CHANGE UP (ref 11.5–14.5)
RBC # FLD: 13.6 % — SIGNIFICANT CHANGE UP (ref 11.5–14.5)
SODIUM SERPL-SCNC: 138 MMOL/L — SIGNIFICANT CHANGE UP (ref 135–146)
SODIUM SERPL-SCNC: 139 MMOL/L — SIGNIFICANT CHANGE UP (ref 135–146)
WBC # BLD: 7.07 K/UL — SIGNIFICANT CHANGE UP (ref 4.8–10.8)
WBC # BLD: 7.82 K/UL — SIGNIFICANT CHANGE UP (ref 4.8–10.8)
WBC # FLD AUTO: 7.07 K/UL — SIGNIFICANT CHANGE UP (ref 4.8–10.8)
WBC # FLD AUTO: 7.82 K/UL — SIGNIFICANT CHANGE UP (ref 4.8–10.8)

## 2022-04-02 PROCEDURE — 99223 1ST HOSP IP/OBS HIGH 75: CPT

## 2022-04-02 RX ORDER — HEPARIN SODIUM 5000 [USP'U]/ML
1200 INJECTION INTRAVENOUS; SUBCUTANEOUS
Qty: 25000 | Refills: 0 | Status: DISCONTINUED | OUTPATIENT
Start: 2022-04-02 | End: 2022-04-04

## 2022-04-02 RX ORDER — DIVALPROEX SODIUM 500 MG/1
750 TABLET, DELAYED RELEASE ORAL
Refills: 0 | Status: DISCONTINUED | OUTPATIENT
Start: 2022-04-02 | End: 2022-04-04

## 2022-04-02 RX ORDER — HEPARIN SODIUM 5000 [USP'U]/ML
1100 INJECTION INTRAVENOUS; SUBCUTANEOUS
Qty: 25000 | Refills: 0 | Status: DISCONTINUED | OUTPATIENT
Start: 2022-04-02 | End: 2022-04-02

## 2022-04-02 RX ADMIN — HEPARIN SODIUM 11 UNIT(S)/HR: 5000 INJECTION INTRAVENOUS; SUBCUTANEOUS at 02:11

## 2022-04-02 RX ADMIN — ATORVASTATIN CALCIUM 20 MILLIGRAM(S): 80 TABLET, FILM COATED ORAL at 21:48

## 2022-04-02 RX ADMIN — Medication 650 MILLIGRAM(S): at 16:48

## 2022-04-02 RX ADMIN — DIVALPROEX SODIUM 250 MILLIGRAM(S): 500 TABLET, DELAYED RELEASE ORAL at 08:42

## 2022-04-02 RX ADMIN — Medication 100 MILLIGRAM(S): at 16:48

## 2022-04-02 RX ADMIN — Medication 100 MILLIGRAM(S): at 08:41

## 2022-04-02 RX ADMIN — HEPARIN SODIUM 11 UNIT(S)/HR: 5000 INJECTION INTRAVENOUS; SUBCUTANEOUS at 08:40

## 2022-04-02 NOTE — CONSULT NOTE ADULT - ATTENDING COMMENTS
Patient also seen and examined by myself. I agree with Dr. ISABELL Mitchell's (Hem-Onc fellow) note above. Situation discussed with her and the patient. All questions answered.

## 2022-04-02 NOTE — CONSULT NOTE ADULT - ASSESSMENT
Patient is a 66 year old male with PMHx of seizure disorder (last seizure a few years ago) presents to the ED with left leg pain for one day. He was found on US of his left lower ext to have Deep venous thrombosis is noted in the left common femoral femoral and popliteal veins and peroneal vein and posterior tibial vein. Per history, this is an unprovoked event. Hematology evaluation requested for further evaluation     #) Acute LLE DVT, unprovoked   #) History of Seizure Disorder     PLAN  - At this time, given the patient has acute thrombosis, no further work hematologic blood work is required at this time. It will be done in 1 month to allow for his acute event to subside and/or resolve  - He needs to complete age appropriate screening including colonoscopy (he has never had one in the past)  - He would benefit from at least CT Abdomen/pelvis to evaluate for any occult malignancy but this can be done as outpatient (per primary team wishes)  - He will be arranged for follow up as outpatient in our office.    Patient is a 66 year old male with PMHx of seizure disorder (last seizure a few years ago) presenting to the ED with left leg pain of one day duration. He was found, on US of his left lower ext, to have deep venous thrombosis in the left common femoral and popliteal veins, as well in the peroneal and posterior tibial veins. Per history, this is an unprovoked event. Hematology evaluation requested for further evaluation     #) Acute LLE DVT, unprovoked   #) History of Seizure Disorder   #) Anemia, normocytic.    PLAN  - At this time, given the patient has acute thrombosis, no further hematologic blood work up is required. It will be done in 1 month to allow for his acute event to subside.  - He needs to complete age appropriate screening including colonoscopy (he has never had one in the past)  - He would benefit from at least CT Abdomen/pelvis, PSA to evaluate for any occult malignancy but this can be done as outpatient (per primary team wishes)  - He will be arranged for follow up as outpatient in our office.   - Also, if he is getting discharged soon, anemia work up should be performed including at least reticulocyte count, LDH, SPEP with IFES, U/A.

## 2022-04-02 NOTE — CONSULT NOTE ADULT - SUBJECTIVE AND OBJECTIVE BOX
Addended by: TYLOR BUCKLEY on: 7/26/2018 11:12 AM     Modules accepted: Orders     Patient is a 66y old  Male who presents with a chief complaint of Extensive Left lower extremity DVT (02 Apr 2022 04:18)      HPI:  Patient is a 66 year old male with PMHx of seizure disorder (last seizure a few years ago) presents to the ED with left leg pain for one day. Pt reports mild pain left buttock and posterior left leg pain. Pt reports similar pain in January 2022, lower extremity duplex at that time was negative. Pt ambulates without difficulty. Denies any recent traveling or long car rides. No history or PMHx of clots. Denies CP, SOB, abdominal pain, HA, weakness.  (01 Apr 2022 11:15)    Additional History   This is a 65 yo M with PMHx of seizure disorder on multiple AEDs who presented to the ED with complaints of left sided buttock pain and left leg pain. He had a venous duplex done in the ED which showed:    Deep venous thrombosis is noted in the left common femoral femoral and   popliteal veins and peroneal vein and posterior tibial vein  Right leg free from thrombus           ROS:  Negative except for:    PAST MEDICAL & SURGICAL HISTORY:  Seizure    HTN (hypertension)    No significant past surgical history        SOCIAL HISTORY:    FAMILY HISTORY:      MEDICATIONS  (STANDING):  acetaminophen     Tablet .. 650 milliGRAM(s) Oral every 6 hours  atorvastatin 20 milliGRAM(s) Oral at bedtime  diVALproex  milliGRAM(s) Oral two times a day  heparin  Infusion 1100 Unit(s)/Hr (11 mL/Hr) IV Continuous <Continuous>  phenytoin   Capsule 100 milliGRAM(s) Oral two times a day    MEDICATIONS  (PRN):  heparin   Injectable 6500 Unit(s) IV Push every 6 hours PRN For aPTT less than 40  heparin   Injectable 3000 Unit(s) IV Push every 6 hours PRN For aPTT between 40 - 57      Allergies    No Known Allergies    Intolerances        Vital Signs Last 24 Hrs  T(C): 37.3 (01 Apr 2022 22:30), Max: 37.3 (01 Apr 2022 22:30)  T(F): 99.2 (01 Apr 2022 22:30), Max: 99.2 (01 Apr 2022 22:30)  HR: 78 (02 Apr 2022 00:00) (70 - 81)  BP: 95/50 (02 Apr 2022 00:00) (90/50 - 96/53)  BP(mean): --  RR: 18 (01 Apr 2022 22:30) (16 - 18)  SpO2: 98% (02 Apr 2022 00:00) (98% - 100%)    PHYSICAL EXAM  General: adult in NAD  HEENT: clear oropharynx, anicteric sclera, pink conjunctiva  Neck: supple  CV: normal S1/S2 with no murmur rubs or gallops  Lungs: positive air movement b/l ant lungs,clear to auscultation, no wheezes, no rales  Abdomen: soft non-tender non-distended, no hepatosplenomegaly  Ext: no clubbing cyanosis or edema  Skin: no rashes and no petechiae  Neuro: alert and oriented X 4, no focal deficits      LABS:                          11.9   7.79  )-----------( 143      ( 01 Apr 2022 18:19 )             35.8         Mean Cell Volume : 92.3 fL  Mean Cell Hemoglobin : 30.7 pg  Mean Cell Hemoglobin Concentration : 33.2 g/dL  Auto Neutrophil # : x  Auto Lymphocyte # : x  Auto Monocyte # : x  Auto Eosinophil # : x  Auto Basophil # : x  Auto Neutrophil % : x  Auto Lymphocyte % : x  Auto Monocyte % : x  Auto Eosinophil % : x  Auto Basophil % : x      Serial CBC's  04-01 @ 18:19  Hct-35.8 / Hgb-11.9 / Plat-143 / RBC-3.88 / WBC-7.79  Serial CBC's  04-01 @ 09:10  Hct-39.0 / Hgb-12.8 / Plat-149 / RBC-4.24 / WBC-8.73      04-01    142  |  106  |  23<H>  ----------------------------<  92  4.4   |  25  |  0.8    Ca    8.5      01 Apr 2022 09:10  Phos  4.1     04-01  Mg     2.2     04-01    TPro  6.6  /  Alb  4.0  /  TBili  0.3  /  DBili  x   /  AST  20  /  ALT  26  /  AlkPhos  76  04-01      PT/INR - ( 01 Apr 2022 20:58 )   PT: 11.70 sec;   INR: 1.02 ratio         PTT - ( 01 Apr 2022 23:30 )  PTT:105.5 sec                BLOOD SMEAR INTERPRETATION:       RADIOLOGY & ADDITIONAL STUDIES:     Patient is a 66y old  Male who presents with a chief complaint of Extensive Left lower extremity DVT (02 Apr 2022 04:18)      HPI:  Patient is a 66 year old male with PMHx of seizure disorder (last seizure a few years ago) presents to the ED with left leg pain for one day. Pt reports mild pain left buttock and posterior left leg pain. Pt reports similar pain in January 2022, lower extremity duplex at that time was negative. Pt ambulates without difficulty. Denies any recent traveling or long car rides. No history or PMHx of clots. Denies CP, SOB, abdominal pain, HA, weakness.  (01 Apr 2022 11:15)    Additional History   This is a 65 yo M with PMHx of seizure disorder on multiple AEDs who presented to the ED with complaints of left sided buttock pain and left leg pain. He had a venous duplex done in the ED which showed:    Deep venous thrombosis is noted in the left common femoral femoral and   popliteal veins and peroneal vein and posterior tibial vein  Right leg free from thrombus    He denies any periods of immobilization (recent surgeries, air/car travel). He denies any known family history of blood clots. He denies any personal history of DVT/PE prior to this admission.     HCM:  He has never had a colonoscopy done        ROS:  Negative except for:    PAST MEDICAL & SURGICAL HISTORY:  Seizure    HTN (hypertension)    No significant past surgical history        SOCIAL HISTORY:    FAMILY HISTORY:      MEDICATIONS  (STANDING):  acetaminophen     Tablet .. 650 milliGRAM(s) Oral every 6 hours  atorvastatin 20 milliGRAM(s) Oral at bedtime  diVALproex  milliGRAM(s) Oral two times a day  heparin  Infusion 1100 Unit(s)/Hr (11 mL/Hr) IV Continuous <Continuous>  phenytoin   Capsule 100 milliGRAM(s) Oral two times a day    MEDICATIONS  (PRN):  heparin   Injectable 6500 Unit(s) IV Push every 6 hours PRN For aPTT less than 40  heparin   Injectable 3000 Unit(s) IV Push every 6 hours PRN For aPTT between 40 - 57      Allergies    No Known Allergies    Intolerances        Vital Signs Last 24 Hrs  T(C): 37.3 (01 Apr 2022 22:30), Max: 37.3 (01 Apr 2022 22:30)  T(F): 99.2 (01 Apr 2022 22:30), Max: 99.2 (01 Apr 2022 22:30)  HR: 78 (02 Apr 2022 00:00) (70 - 81)  BP: 95/50 (02 Apr 2022 00:00) (90/50 - 96/53)  BP(mean): --  RR: 18 (01 Apr 2022 22:30) (16 - 18)  SpO2: 98% (02 Apr 2022 00:00) (98% - 100%)    PHYSICAL EXAM  General: adult in NAD  HEENT: PERRL  CV: normal S1/S2 with no murmur rubs or gallops  Lungs: CTABL  Abdomen: soft non-tender non-distended  Ext: no edema, some warmth to palpation on LLE with mild swelling (no gross swelling)  Neuro: alert and oriented X 4      LABS:                          11.9   7.79  )-----------( 143      ( 01 Apr 2022 18:19 )             35.8         Mean Cell Volume : 92.3 fL  Mean Cell Hemoglobin : 30.7 pg  Mean Cell Hemoglobin Concentration : 33.2 g/dL  Auto Neutrophil # : x  Auto Lymphocyte # : x  Auto Monocyte # : x  Auto Eosinophil # : x  Auto Basophil # : x  Auto Neutrophil % : x  Auto Lymphocyte % : x  Auto Monocyte % : x  Auto Eosinophil % : x  Auto Basophil % : x      Serial CBC's  04-01 @ 18:19  Hct-35.8 / Hgb-11.9 / Plat-143 / RBC-3.88 / WBC-7.79  Serial CBC's  04-01 @ 09:10  Hct-39.0 / Hgb-12.8 / Plat-149 / RBC-4.24 / WBC-8.73      04-01    142  |  106  |  23<H>  ----------------------------<  92  4.4   |  25  |  0.8    Ca    8.5      01 Apr 2022 09:10  Phos  4.1     04-01  Mg     2.2     04-01    TPro  6.6  /  Alb  4.0  /  TBili  0.3  /  DBili  x   /  AST  20  /  ALT  26  /  AlkPhos  76  04-01      PT/INR - ( 01 Apr 2022 20:58 )   PT: 11.70 sec;   INR: 1.02 ratio         PTT - ( 01 Apr 2022 23:30 )  PTT:105.5 sec                BLOOD SMEAR INTERPRETATION:       RADIOLOGY & ADDITIONAL STUDIES:     Patient is a 66y old  Male who presents with a chief complaint of Extensive Left lower extremity DVT (02 Apr 2022 04:18)      HPI:  Patient is a 66 year old male with PMHx of seizure disorder (last seizure a few years ago) presents to the ED with left leg pain for one day. Pt reports mild pain left buttock and posterior left leg pain. Pt reports similar pain in January 2022, lower extremity duplex at that time was negative. Pt ambulates without difficulty. Denies any recent traveling or long car rides. No history or PMHx of clots. Denies CP, SOB, abdominal pain, HA, weakness.  (01 Apr 2022 11:15)    Additional History   This is a 67 yo M with PMHx of seizure disorder on multiple AEDs who presented to the ED with complaints of left sided buttock and left leg pain. He had a venous duplex done in the ED which showed:    Deep venous thrombosis is noted in the left common femoral femoral and   popliteal veins and peroneal vein and posterior tibial vein  Right leg free from thrombus    He denies any periods of immobilization (recent surgeries, air/car travel). He denies any known family history of blood clots. He denies any personal history of DVT/PE prior to this admission.     HCM:  He has never had a colonoscopy done        ROS:  Negative except for:    PAST MEDICAL & SURGICAL HISTORY:  Seizure    HTN (hypertension)    No significant past surgical history        SOCIAL HISTORY:    FAMILY HISTORY:      MEDICATIONS  (STANDING):  acetaminophen     Tablet .. 650 milliGRAM(s) Oral every 6 hours  atorvastatin 20 milliGRAM(s) Oral at bedtime  diVALproex  milliGRAM(s) Oral two times a day  heparin  Infusion 1100 Unit(s)/Hr (11 mL/Hr) IV Continuous <Continuous>  phenytoin   Capsule 100 milliGRAM(s) Oral two times a day    MEDICATIONS  (PRN):  heparin   Injectable 6500 Unit(s) IV Push every 6 hours PRN For aPTT less than 40  heparin   Injectable 3000 Unit(s) IV Push every 6 hours PRN For aPTT between 40 - 57      Allergies    No Known Allergies    Intolerances        Vital Signs Last 24 Hrs  T(C): 37.3 (01 Apr 2022 22:30), Max: 37.3 (01 Apr 2022 22:30)  T(F): 99.2 (01 Apr 2022 22:30), Max: 99.2 (01 Apr 2022 22:30)  HR: 78 (02 Apr 2022 00:00) (70 - 81)  BP: 95/50 (02 Apr 2022 00:00) (90/50 - 96/53)  BP(mean): --  RR: 18 (01 Apr 2022 22:30) (16 - 18)  SpO2: 98% (02 Apr 2022 00:00) (98% - 100%)    PHYSICAL EXAM  General: adult in NAD  HEENT: PERRL  CV: normal S1/S2 with no murmur rubs or gallops  Lungs: CTABL  Abdomen: soft non-tender non-distended  Ext: no edema, some warmth to palpation on LLE with mild swelling (no gross swelling)  Neuro: alert and oriented X 4      LABS:                          11.9   7.79  )-----------( 143      ( 01 Apr 2022 18:19 )             35.8         Mean Cell Volume : 92.3 fL  Mean Cell Hemoglobin : 30.7 pg  Mean Cell Hemoglobin Concentration : 33.2 g/dL  Auto Neutrophil # : x  Auto Lymphocyte # : x  Auto Monocyte # : x  Auto Eosinophil # : x  Auto Basophil # : x  Auto Neutrophil % : x  Auto Lymphocyte % : x  Auto Monocyte % : x  Auto Eosinophil % : x  Auto Basophil % : x      Serial CBC's  04-01 @ 18:19  Hct-35.8 / Hgb-11.9 / Plat-143 / RBC-3.88 / WBC-7.79  Serial CBC's  04-01 @ 09:10  Hct-39.0 / Hgb-12.8 / Plat-149 / RBC-4.24 / WBC-8.73      04-01    142  |  106  |  23<H>  ----------------------------<  92  4.4   |  25  |  0.8    Ca    8.5      01 Apr 2022 09:10  Phos  4.1     04-01  Mg     2.2     04-01    TPro  6.6  /  Alb  4.0  /  TBili  0.3  /  DBili  x   /  AST  20  /  ALT  26  /  AlkPhos  76  04-01      PT/INR - ( 01 Apr 2022 20:58 )   PT: 11.70 sec;   INR: 1.02 ratio         PTT - ( 01 Apr 2022 23:30 )  PTT:105.5 sec                BLOOD SMEAR INTERPRETATION:       RADIOLOGY & ADDITIONAL STUDIES:

## 2022-04-03 LAB
APTT BLD: 58.8 SEC — HIGH (ref 27–39.2)
BLD GP AB SCN SERPL QL: SIGNIFICANT CHANGE UP

## 2022-04-03 RX ORDER — SODIUM CHLORIDE 9 MG/ML
1000 INJECTION, SOLUTION INTRAVENOUS
Refills: 0 | Status: DISCONTINUED | OUTPATIENT
Start: 2022-04-03 | End: 2022-04-04

## 2022-04-03 RX ADMIN — HEPARIN SODIUM 1200 UNIT(S)/HR: 5000 INJECTION INTRAVENOUS; SUBCUTANEOUS at 00:58

## 2022-04-03 RX ADMIN — Medication 100 MILLIGRAM(S): at 08:12

## 2022-04-03 RX ADMIN — DIVALPROEX SODIUM 750 MILLIGRAM(S): 500 TABLET, DELAYED RELEASE ORAL at 08:13

## 2022-04-03 RX ADMIN — Medication 100 MILLIGRAM(S): at 18:17

## 2022-04-03 RX ADMIN — ATORVASTATIN CALCIUM 20 MILLIGRAM(S): 80 TABLET, FILM COATED ORAL at 22:03

## 2022-04-03 RX ADMIN — HEPARIN SODIUM 1200 UNIT(S)/HR: 5000 INJECTION INTRAVENOUS; SUBCUTANEOUS at 22:03

## 2022-04-04 ENCOUNTER — TRANSCRIPTION ENCOUNTER (OUTPATIENT)
Age: 67
End: 2022-04-04

## 2022-04-04 ENCOUNTER — RESULT REVIEW (OUTPATIENT)
Age: 67
End: 2022-04-04

## 2022-04-04 VITALS
RESPIRATION RATE: 18 BRPM | DIASTOLIC BLOOD PRESSURE: 52 MMHG | HEART RATE: 69 BPM | TEMPERATURE: 96 F | SYSTOLIC BLOOD PRESSURE: 93 MMHG

## 2022-04-04 LAB
ANION GAP SERPL CALC-SCNC: 13 MMOL/L — SIGNIFICANT CHANGE UP (ref 7–14)
APTT BLD: 53.4 SEC — HIGH (ref 27–39.2)
BASOPHILS # BLD AUTO: 0.07 K/UL — SIGNIFICANT CHANGE UP (ref 0–0.2)
BASOPHILS NFR BLD AUTO: 0.9 % — SIGNIFICANT CHANGE UP (ref 0–1)
BLD GP AB SCN SERPL QL: SIGNIFICANT CHANGE UP
BUN SERPL-MCNC: 15 MG/DL — SIGNIFICANT CHANGE UP (ref 10–20)
CALCIUM SERPL-MCNC: 8.6 MG/DL — SIGNIFICANT CHANGE UP (ref 8.5–10.1)
CHLORIDE SERPL-SCNC: 106 MMOL/L — SIGNIFICANT CHANGE UP (ref 98–110)
CO2 SERPL-SCNC: 23 MMOL/L — SIGNIFICANT CHANGE UP (ref 17–32)
CREAT SERPL-MCNC: 0.9 MG/DL — SIGNIFICANT CHANGE UP (ref 0.7–1.5)
EGFR: 95 ML/MIN/1.73M2 — SIGNIFICANT CHANGE UP
EOSINOPHIL # BLD AUTO: 0.47 K/UL — SIGNIFICANT CHANGE UP (ref 0–0.7)
EOSINOPHIL NFR BLD AUTO: 5.9 % — SIGNIFICANT CHANGE UP (ref 0–8)
GLUCOSE SERPL-MCNC: 92 MG/DL — SIGNIFICANT CHANGE UP (ref 70–99)
HCT VFR BLD CALC: 36 % — LOW (ref 42–52)
HCT VFR BLD CALC: 38.1 % — LOW (ref 42–52)
HGB BLD-MCNC: 12.3 G/DL — LOW (ref 14–18)
HGB BLD-MCNC: 12.7 G/DL — LOW (ref 14–18)
IMM GRANULOCYTES NFR BLD AUTO: 0.8 % — HIGH (ref 0.1–0.3)
INR BLD: 1 RATIO — SIGNIFICANT CHANGE UP (ref 0.65–1.3)
LYMPHOCYTES # BLD AUTO: 2.41 K/UL — SIGNIFICANT CHANGE UP (ref 1.2–3.4)
LYMPHOCYTES # BLD AUTO: 30.4 % — SIGNIFICANT CHANGE UP (ref 20.5–51.1)
MAGNESIUM SERPL-MCNC: 2.1 MG/DL — SIGNIFICANT CHANGE UP (ref 1.8–2.4)
MCHC RBC-ENTMCNC: 30.4 PG — SIGNIFICANT CHANGE UP (ref 27–31)
MCHC RBC-ENTMCNC: 30.7 PG — SIGNIFICANT CHANGE UP (ref 27–31)
MCHC RBC-ENTMCNC: 33.3 G/DL — SIGNIFICANT CHANGE UP (ref 32–37)
MCHC RBC-ENTMCNC: 34.2 G/DL — SIGNIFICANT CHANGE UP (ref 32–37)
MCV RBC AUTO: 89.8 FL — SIGNIFICANT CHANGE UP (ref 80–94)
MCV RBC AUTO: 91.1 FL — SIGNIFICANT CHANGE UP (ref 80–94)
MONOCYTES # BLD AUTO: 0.74 K/UL — HIGH (ref 0.1–0.6)
MONOCYTES NFR BLD AUTO: 9.3 % — SIGNIFICANT CHANGE UP (ref 1.7–9.3)
NEUTROPHILS # BLD AUTO: 4.18 K/UL — SIGNIFICANT CHANGE UP (ref 1.4–6.5)
NEUTROPHILS NFR BLD AUTO: 52.7 % — SIGNIFICANT CHANGE UP (ref 42.2–75.2)
NRBC # BLD: 0 /100 WBCS — SIGNIFICANT CHANGE UP (ref 0–0)
NRBC # BLD: 0 /100 WBCS — SIGNIFICANT CHANGE UP (ref 0–0)
PHOSPHATE SERPL-MCNC: 3.6 MG/DL — SIGNIFICANT CHANGE UP (ref 2.1–4.9)
PLATELET # BLD AUTO: 172 K/UL — SIGNIFICANT CHANGE UP (ref 130–400)
PLATELET # BLD AUTO: 175 K/UL — SIGNIFICANT CHANGE UP (ref 130–400)
POTASSIUM SERPL-MCNC: 4.7 MMOL/L — SIGNIFICANT CHANGE UP (ref 3.5–5)
POTASSIUM SERPL-SCNC: 4.7 MMOL/L — SIGNIFICANT CHANGE UP (ref 3.5–5)
PROTHROM AB SERPL-ACNC: 11.5 SEC — SIGNIFICANT CHANGE UP (ref 9.95–12.87)
RBC # BLD: 4.01 M/UL — LOW (ref 4.7–6.1)
RBC # BLD: 4.18 M/UL — LOW (ref 4.7–6.1)
RBC # FLD: 13.2 % — SIGNIFICANT CHANGE UP (ref 11.5–14.5)
RBC # FLD: 13.5 % — SIGNIFICANT CHANGE UP (ref 11.5–14.5)
SARS-COV-2 RNA SPEC QL NAA+PROBE: SIGNIFICANT CHANGE UP
SODIUM SERPL-SCNC: 142 MMOL/L — SIGNIFICANT CHANGE UP (ref 135–146)
WBC # BLD: 7.32 K/UL — SIGNIFICANT CHANGE UP (ref 4.8–10.8)
WBC # BLD: 7.93 K/UL — SIGNIFICANT CHANGE UP (ref 4.8–10.8)
WBC # FLD AUTO: 7.32 K/UL — SIGNIFICANT CHANGE UP (ref 4.8–10.8)
WBC # FLD AUTO: 7.93 K/UL — SIGNIFICANT CHANGE UP (ref 4.8–10.8)

## 2022-04-04 PROCEDURE — 37187 VENOUS MECH THROMBECTOMY: CPT

## 2022-04-04 PROCEDURE — 76937 US GUIDE VASCULAR ACCESS: CPT | Mod: 26

## 2022-04-04 PROCEDURE — 36010 PLACE CATHETER IN VEIN: CPT

## 2022-04-04 PROCEDURE — 75820 VEIN X-RAY ARM/LEG: CPT | Mod: 26,59

## 2022-04-04 PROCEDURE — 88304 TISSUE EXAM BY PATHOLOGIST: CPT | Mod: 26

## 2022-04-04 RX ORDER — ACETAMINOPHEN 500 MG
2 TABLET ORAL
Qty: 56 | Refills: 0
Start: 2022-04-04 | End: 2022-04-10

## 2022-04-04 RX ORDER — SODIUM CHLORIDE 9 MG/ML
1000 INJECTION, SOLUTION INTRAVENOUS
Refills: 0 | Status: DISCONTINUED | OUTPATIENT
Start: 2022-04-04 | End: 2022-04-04

## 2022-04-04 RX ORDER — ATORVASTATIN CALCIUM 80 MG/1
20 TABLET, FILM COATED ORAL AT BEDTIME
Refills: 0 | Status: DISCONTINUED | OUTPATIENT
Start: 2022-04-04 | End: 2022-04-04

## 2022-04-04 RX ORDER — ONDANSETRON 8 MG/1
4 TABLET, FILM COATED ORAL ONCE
Refills: 0 | Status: DISCONTINUED | OUTPATIENT
Start: 2022-04-04 | End: 2022-04-04

## 2022-04-04 RX ORDER — APIXABAN 2.5 MG/1
1 TABLET, FILM COATED ORAL
Qty: 74 | Refills: 0
Start: 2022-04-04

## 2022-04-04 RX ORDER — APIXABAN 2.5 MG/1
5 TABLET, FILM COATED ORAL
Qty: 74 | Refills: 0
Start: 2022-04-04

## 2022-04-04 RX ORDER — CHLORHEXIDINE GLUCONATE 213 G/1000ML
1 SOLUTION TOPICAL
Refills: 0 | Status: DISCONTINUED | OUTPATIENT
Start: 2022-04-04 | End: 2022-04-04

## 2022-04-04 RX ORDER — ENOXAPARIN SODIUM 100 MG/ML
80 INJECTION SUBCUTANEOUS EVERY 12 HOURS
Refills: 0 | Status: DISCONTINUED | OUTPATIENT
Start: 2022-04-04 | End: 2022-04-04

## 2022-04-04 RX ORDER — OXYCODONE AND ACETAMINOPHEN 5; 325 MG/1; MG/1
1 TABLET ORAL ONCE
Refills: 0 | Status: DISCONTINUED | OUTPATIENT
Start: 2022-04-04 | End: 2022-04-04

## 2022-04-04 RX ORDER — MORPHINE SULFATE 50 MG/1
2 CAPSULE, EXTENDED RELEASE ORAL
Refills: 0 | Status: DISCONTINUED | OUTPATIENT
Start: 2022-04-04 | End: 2022-04-04

## 2022-04-04 RX ORDER — HYDROMORPHONE HYDROCHLORIDE 2 MG/ML
0.5 INJECTION INTRAMUSCULAR; INTRAVENOUS; SUBCUTANEOUS
Refills: 0 | Status: DISCONTINUED | OUTPATIENT
Start: 2022-04-04 | End: 2022-04-04

## 2022-04-04 RX ORDER — PANTOPRAZOLE SODIUM 20 MG/1
40 TABLET, DELAYED RELEASE ORAL
Refills: 0 | Status: DISCONTINUED | OUTPATIENT
Start: 2022-04-04 | End: 2022-04-04

## 2022-04-04 RX ORDER — DIVALPROEX SODIUM 500 MG/1
750 TABLET, DELAYED RELEASE ORAL
Refills: 0 | Status: DISCONTINUED | OUTPATIENT
Start: 2022-04-04 | End: 2022-04-04

## 2022-04-04 RX ORDER — ONDANSETRON 8 MG/1
4 TABLET, FILM COATED ORAL EVERY 6 HOURS
Refills: 0 | Status: DISCONTINUED | OUTPATIENT
Start: 2022-04-04 | End: 2022-04-04

## 2022-04-04 RX ORDER — ACETAMINOPHEN 500 MG
650 TABLET ORAL EVERY 6 HOURS
Refills: 0 | Status: DISCONTINUED | OUTPATIENT
Start: 2022-04-04 | End: 2022-04-04

## 2022-04-04 RX ADMIN — ENOXAPARIN SODIUM 80 MILLIGRAM(S): 100 INJECTION SUBCUTANEOUS at 16:17

## 2022-04-04 RX ADMIN — Medication 100 MILLIGRAM(S): at 05:52

## 2022-04-04 RX ADMIN — SODIUM CHLORIDE 100 MILLILITER(S): 9 INJECTION, SOLUTION INTRAVENOUS at 00:05

## 2022-04-04 RX ADMIN — DIVALPROEX SODIUM 750 MILLIGRAM(S): 500 TABLET, DELAYED RELEASE ORAL at 08:04

## 2022-04-04 RX ADMIN — Medication 100 MILLIGRAM(S): at 17:00

## 2022-04-04 RX ADMIN — SODIUM CHLORIDE 75 MILLILITER(S): 9 INJECTION, SOLUTION INTRAVENOUS at 14:02

## 2022-04-04 NOTE — DISCHARGE NOTE PROVIDER - HOSPITAL COURSE
04/01/22 Patient is a 66 year old male with PMHx of seizure disorder (last seizure a few years ago) presents to the ED with left leg pain for one day. Pt reports mild pain left buttock and posterior left leg pain. Pt reports similar pain in January 2022, lower extremity duplex at that time was negative. Pt ambulates without difficulty. Denies any recent traveling or long car rides. No history or PMHx of clots. Denies CP, SOB, abdominal pain, HA, weakness. PT was started on heparin drip.  US Duplex of LE : Deep venous thrombosis is noted in the left common femoral femoral and popliteal veins and peroneal vein and posterior tibial vein.  HEME/ONC was consulted for unprovoked DVT.  04/02/22 No acute events.  Heme/Onc consult: Follow up Heme/Onc outpatient for workup and F/U GI for Colonoscopy.  04/03/22  Pt seen and examined at bedside. Pt reports pain when ambulating. PTT 46.8, increased heparin drip. PREOP OR tomorrow for LLE thrombectomy.   04/04/22 S/P Transluminal mechanical thrombectomy of vein. Venogram femoral and other lower limb veins using contrast material. Uneventful postoperative course.   Plan to start Eliquis.      04/01/22 Patient is a 66 year old male with PMHx of seizure disorder (last seizure a few years ago) presents to the ED with left leg pain for one day. Pt reports mild pain left buttock and posterior left leg pain. Pt reports similar pain in January 2022, lower extremity duplex at that time was negative. Pt ambulates without difficulty. Denies any recent traveling or long car rides. No history or PMHx of clots. Denies CP, SOB, abdominal pain, HA, weakness. PT was started on heparin drip.  US Duplex of LE : Deep venous thrombosis is noted in the left common femoral femoral and popliteal veins and peroneal vein and posterior tibial vein.  HEME/ONC was consulted for unprovoked DVT.  04/02/22 No acute events.  Heme/Onc consult: Follow up Heme/Onc outpatient for workup and F/U GI for Colonoscopy.  04/03/22  Pt seen and examined at bedside. Pt reports pain when ambulating. PTT 46.8, increased heparin drip. PREOP OR tomorrow for LLE thrombectomy.   04/04/22 S/P Transluminal mechanical thrombectomy of LLE vein. Venogram femoral and other lower limb veins using contrast material. Uneventful postoperative course.   Plan to start Eliquis and discharge today.

## 2022-04-04 NOTE — DISCHARGE NOTE PROVIDER - PROVIDER TOKENS
PROVIDER:[TOKEN:[[id number]],FOLLOWUP:[2 weeks]],PROVIDER:[TOKEN:[[id number]],FOLLOWUP:[1 week]] Abdomen soft, nontender, nondistended, bowel sounds present in all 4 quadrants.

## 2022-04-04 NOTE — DISCHARGE NOTE PROVIDER - NSDCFUADDINST_GEN_ALL_CORE_FT
You are being discharge home today. Start Eliquis 10 mg 2 times a day for 7 days, then continue 5 mg 2 times a day.   Please call and schedule a follow up appointments with Dr. Rodriguez in 2 weeks, Dr. Correa in 1 week. Also follow up with   your gastroenterologist for colonoscopy and primary care physician. Use Tylenol 650 mg for pain every 6 hours for 3 days, afterwords use for pain as needed.  If you have fever (>100.4 F), severe pain, nausea, vomiting or redness and discharge from incision site call   Dr. Rodriguez's office during regular business hours or go to the nearest emergency room for evaluation. You are being discharge home today. Start Eliquis 10 mg (2 tabs) 2 times a day for 7 days, then continue 5 mg (1 tab) 2 times a day.   Please call and schedule a follow up appointments with Dr. Rodriguez in 2 weeks, Dr. Correa in 1 week. Also follow up with   your gastroenterologist for colonoscopy and primary care physician. Use Tylenol 650 mg for pain every 6 hours for 3 days, afterwords use for pain as needed.  If you have fever (>100.4 F), severe pain, nausea, vomiting or redness and discharge from incision site call   Dr. Rodriguez's office during regular business hours or go to the nearest emergency room for evaluation.

## 2022-04-04 NOTE — PROGRESS NOTE ADULT - SUBJECTIVE AND OBJECTIVE BOX
VASCULAR SURGERY PROGRESS NOTE    CC: left leg edema  Hospital Day #  Post-Op Day #    Procedure:    Events of past 24 hours: awaits venogram today          ROS otherwise negative except per subjective and HPI      PAST MEDICAL & SURGICAL HISTORY:  Seizure    HTN (hypertension)    No significant past surgical history        Vital Signs Last 24 Hrs  T(C): 36.9 (04 Apr 2022 08:00), Max: 37.2 (04 Apr 2022 00:00)  T(F): 98.4 (04 Apr 2022 08:00), Max: 98.9 (04 Apr 2022 00:00)  HR: 67 (04 Apr 2022 08:00) (67 - 70)  BP: 102/58 (04 Apr 2022 08:00) (88/54 - 102/58)  BP(mean): --  RR: 18 (04 Apr 2022 08:00) (18 - 18)  SpO2: 95% (04 Apr 2022 00:00) (95% - 95%)    Pain (0-10):            Pain Control Adequate: [] YES [] N    Diet:    I&O's Detail    04 Apr 2022 07:01  -  04 Apr 2022 09:27  --------------------------------------------------------  IN:    Heparin Infusion: 24 mL    Lactated Ringers: 200 mL  Total IN: 224 mL    OUT:  Total OUT: 0 mL    Total NET: 224 mL      PHYSICAL EXAM    Appearance: Normal	  HEENT:   Normal oral mucosa, PERRL, EOMI	  Neck: Supple, - JVD;   Cardiovascular: Normal S1 S2, No JVD, No murmurs,   Respiratory: Lungs clear to auscultation/No Rales, Rhonchi, Wheezing	  Gastrointestinal:  Soft, Non-tender, + BS	  Skin: No rashes, No ecchymoses, No cyanosis  Extremities: Normal range of motion, No clubbing, cyanosis   left leg edema  Neurologic: Non-focal  Psychiatry: A & O x 3, Mood & affect appropriate          MEDICATIONS:   MEDICATIONS  (STANDING):  acetaminophen     Tablet .. 650 milliGRAM(s) Oral every 6 hours  atorvastatin 20 milliGRAM(s) Oral at bedtime  diVALproex  milliGRAM(s) Oral <User Schedule>  heparin  Infusion. 1200 Unit(s)/Hr (12 mL/Hr) IV Continuous <Continuous>  lactated ringers. 1000 milliLiter(s) (100 mL/Hr) IV Continuous <Continuous>  phenytoin   Capsule 100 milliGRAM(s) Oral two times a day    MEDICATIONS  (PRN):  heparin   Injectable 6500 Unit(s) IV Push every 6 hours PRN For aPTT less than 40  heparin   Injectable 3000 Unit(s) IV Push every 6 hours PRN For aPTT between 40 - 57      DVT PROPHYLAXIS: [] YES [x] NO  GI PROPHYLAXIS: [] YES [] NO  ANTIPLATELETS: [] YES [x] NO  ANTICOAGULATION: [x] YES [] NO  ANTIBIOTICS: [] YES [x] NO    LAB/STUDIES:                        12.7   7.32  )-----------( 175      ( 04 Apr 2022 06:40 )             38.1     04-03    142  |  106  |  15  ----------------------------<  92  4.7   |  23  |  0.9    Ca    8.6      03 Apr 2022 23:30  Phos  3.6     04-03  Mg     2.1     04-03      PT/INR - ( 03 Apr 2022 23:30 )   PT: 11.50 sec;   INR: 1.00 ratio         PTT - ( 03 Apr 2022 23:30 )  PTT:53.4 sec                      IMAGING:      < from: [facility] Duplex Lower Ext Vein Scan, Bilat (04.01.22 @ 09:00) >  Deep venous thrombosis is noted in the left common femoral femoral and   popliteal veins and peroneal vein and posterior tibial vein      Right leg free from thrombus      
SURGERY PROGRESS NOTE      Events of past 24 hours:    No acute events overnight       ROS otherwise negative except per subjective and HPI      Vital Signs Last 24 Hrs  T(C): 37.3 (01 Apr 2022 22:30), Max: 37.3 (01 Apr 2022 22:30)  T(F): 99.2 (01 Apr 2022 22:30), Max: 99.2 (01 Apr 2022 22:30)  HR: 78 (02 Apr 2022 00:00) (70 - 88)  BP: 95/50 (02 Apr 2022 00:00) (90/50 - 103/54)  BP(mean): --  RR: 18 (01 Apr 2022 22:30) (16 - 18)  SpO2: 98% (02 Apr 2022 00:00) (98% - 100%)    Diet, Regular (04-01-22 @ 12:06) [Active]          I&O's Detail    01 Apr 2022 07:01  -  02 Apr 2022 04:18  --------------------------------------------------------  IN:  Total IN: 0 mL    OUT:    Voided (mL): 50 mL  Total OUT: 50 mL    Total NET: -50 mL          General: NAD  HEENT: NCAT, EOMI	  Neck: Supple  Cardiovascular: S1 S2   Respiratory: Bilateral breath sounds, normal respiratory effort 	  Gastrointestinal:  Soft, Non-tender, nondistended   Skin: No jaundice   Extremities: Normal range of motion, LLE edema          MEDICATIONS:   MEDICATIONS  (STANDING):  acetaminophen     Tablet .. 650 milliGRAM(s) Oral every 6 hours  atorvastatin 20 milliGRAM(s) Oral at bedtime  diVALproex  milliGRAM(s) Oral two times a day  heparin  Infusion 1100 Unit(s)/Hr (11 mL/Hr) IV Continuous <Continuous>  phenytoin   Capsule 100 milliGRAM(s) Oral two times a day    MEDICATIONS  (PRN):  heparin   Injectable 6500 Unit(s) IV Push every 6 hours PRN For aPTT less than 40  heparin   Injectable 3000 Unit(s) IV Push every 6 hours PRN For aPTT between 40 - 57        LAB/STUDIES:                        11.9   7.79  )-----------( 143      ( 01 Apr 2022 18:19 )             35.8     04-01    142  |  106  |  23<H>  ----------------------------<  92  4.4   |  25  |  0.8    Ca    8.5      01 Apr 2022 09:10  Phos  4.1     04-01  Mg     2.2     04-01    TPro  6.6  /  Alb  4.0  /  TBili  0.3  /  DBili  x   /  AST  20  /  ALT  26  /  AlkPhos  76  04-01    PT/INR - ( 01 Apr 2022 20:58 )   PT: 11.70 sec;   INR: 1.02 ratio         PTT - ( 01 Apr 2022 23:30 )  PTT:105.5 sec  LIVER FUNCTIONS - ( 01 Apr 2022 09:10 )  Alb: 4.0 g/dL / Pro: 6.6 g/dL / ALK PHOS: 76 U/L / ALT: 26 U/L / AST: 20 U/L / GGT: x                     IMAGING:    
VASCULAR SURGERY PROGRESS NOTE    Hospital Day #3    Events of past 24 hours: Pt seen and examined at bedside. Pt reports pain when ambulating. PTT 46.8, increased heparin drip to 12.     PAST MEDICAL & SURGICAL HISTORY:  Seizure  HTN (hypertension)  No significant past surgical history    Vital Signs Last 24 Hrs  T(C): 36.6 (02 Apr 2022 23:57), Max: 36.9 (02 Apr 2022 16:00)  T(F): 97.9 (02 Apr 2022 23:57), Max: 98.5 (02 Apr 2022 16:00)  HR: 79 (02 Apr 2022 23:57) (76 - 80)  BP: 105/61 (02 Apr 2022 23:57) (96/54 - 105/61)  BP(mean): --  RR: 18 (02 Apr 2022 23:57) (18 - 18)  SpO2: --    Pain (0-10):            Pain Control Adequate: [] YES [] N    Diet:    I&O's Detail  01 Apr 2022 07:01  -  02 Apr 2022 07:00  --------------------------------------------------------  IN:  Total IN: 0 mL  OUT:    Voided (mL): 50 mL  Total OUT: 50 mL  Total NET: -50 mL    PHYSICAL EXAM  Appearance: NAD	  HEENT: NCAT; EOMI	  Cardiovascular: S1, S2  Respiratory: bilateral breath sounds; normal respiratory effort  Gastrointestinal:  Soft, Non-tender, nondistended   Extremities: RLE edema  Neurologic: Non-focal    MEDICATIONS:   MEDICATIONS  (STANDING):  acetaminophen     Tablet .. 650 milliGRAM(s) Oral every 6 hours  atorvastatin 20 milliGRAM(s) Oral at bedtime  diVALproex  milliGRAM(s) Oral <User Schedule>  heparin  Infusion. 1200 Unit(s)/Hr (12 mL/Hr) IV Continuous <Continuous>  phenytoin   Capsule 100 milliGRAM(s) Oral two times a day    MEDICATIONS  (PRN):  heparin   Injectable 6500 Unit(s) IV Push every 6 hours PRN For aPTT less than 40  heparin   Injectable 3000 Unit(s) IV Push every 6 hours PRN For aPTT between 40 - 57    LAB/STUDIES:                        11.9   7.07  )-----------( 160      ( 02 Apr 2022 20:00 )             35.4     04-02    138  |  104  |  20  ----------------------------<  92  4.3   |  25  |  0.9    Ca    8.2<L>      02 Apr 2022 20:00  Phos  3.5     04-02  Mg     2.2     04-02    TPro  6.6  /  Alb  4.0  /  TBili  0.3  /  DBili  x   /  AST  20  /  ALT  26  /  AlkPhos  76  04-01    PT/INR - ( 02 Apr 2022 04:30 )   PT: 11.60 sec;   INR: 1.01 ratio    PTT - ( 02 Apr 2022 20:00 )  PTT:46.8 sec  LIVER FUNCTIONS - ( 01 Apr 2022 09:10 )  Alb: 4.0 g/dL / Pro: 6.6 g/dL / ALK PHOS: 76 U/L / ALT: 26 U/L / AST: 20 U/L / GGT: x           IMAGING:  No new imaging past 24hrs

## 2022-04-04 NOTE — PROGRESS NOTE ADULT - ASSESSMENT
ASSESSMENT:  Patient is a 66 year old male with PMHx of seizure disorder (last seizure a few years ago) presents to the ED with left leg pain for one day. Pt reports mild pain left buttock and posterior left leg pain. Pt reports similar pain in January 2022, lower extremity duplex at that time was negative. Pt ambulates without difficulty. Denies any recent traveling or long car rides. No history or FHx of clots.     PLAN:   -OR tomorrow for Left lower extremity thrombectomy   -Preop today   -Continue heparin drip, monitor PTT   -Pain control   -Heme/Onc: f/u outpatient     VASCULAR TEAM SPECTRA: [id number]
66 year old male with PMHx of seizure disorder (last seizure a few years ago) presents to the ED with left leg pain for one day. Pt reports mild pain left buttock and posterior left leg pain. Pt reports similar pain in January 2022, lower extremity duplex at that time was negative  on admission found extensive DVT on the left leg    1) vascular - extensive DVT  - continue heparin drip  - PTT at 11:00  - leg elevation    2) Heme consult appreciated: - At this time, given the patient has acute thrombosis, no further hematologic blood work up is required. It will be done in 1 month to allow for his acute event to subside.  - He needs to complete age appropriate screening including colonoscopy (he has never had one in the past)  - He would benefit from at least CT Abdomen/pelvis, PSA to evaluate for any occult malignancy but this can be done as outpatient (per primary team wishes)      Plan:  - I reviewed labs  - I reviewed radiology imagings  - I personally visualized the imagings  - I discussed the findings and imagings with Dr. Rodriguez: for venogram and thrombectomy today  keep NPO    SPECTRA [id number]
Patient is a 66 year old male with PMHx of seizure disorder (last seizure a few years ago) presents to the ED with left leg pain for one day. Pt reports mild pain left buttock and posterior left leg pain. Pt reports similar pain in January 2022, lower extremity duplex at that time was negative. Pt ambulates without difficulty. Denies any recent traveling or long car rides. No history or PMHx of clots.     PLAN:   - Vascular intervention during this admission   - Heparin drip   - Hem consult for unprovoked DVT  - Plan to be discussed with Attending, Dr. Rodriguez    VASCULAR TEAM SPECTRA: 0033

## 2022-04-04 NOTE — DISCHARGE NOTE PROVIDER - NSDCMRMEDTOKEN_GEN_ALL_CORE_FT
Depakote 250 mg oral delayed release tablet: 3 tab(s) orally every 12 hours   Dilantin 100 mg oral capsule, extended release: take 2 capsules in the morning and 3 capsules at night  ibuprofen 600 mg oral tablet: 1 tab(s) orally every 8 hours   Lipitor 20 mg oral tablet: 1 tab(s) orally once a day  PHENobarbital 32.4 mg oral tablet: take 1 tablet in the morning and 2 tablets before sleep MDD:97.2 mg   acetaminophen 325 mg oral tablet: 2 tab(s) orally every 6 hours MDD:8 abs  Depakote 250 mg oral delayed release tablet: 3 tab(s) orally every 12 hours   Dilantin 100 mg oral capsule, extended release: take 2 capsules in the morning and 3 capsules at night  Eliquis 5 mg oral tablet: 5 tab(s) orally 2 times a day   ibuprofen 600 mg oral tablet: 1 tab(s) orally every 8 hours   Lipitor 20 mg oral tablet: 1 tab(s) orally once a day  PHENobarbital 32.4 mg oral tablet: take 1 tablet in the morning and 2 tablets before sleep MDD:97.2 mg   acetaminophen 325 mg oral tablet: 2 tab(s) orally every 6 hours MDD:8 abs  Depakote 250 mg oral delayed release tablet: 3 tab(s) orally every 12 hours   Dilantin 100 mg oral capsule, extended release: take 2 capsules in the morning and 3 capsules at night  Eliquis 5 mg oral tablet: 1 tab(s) orally 2 times a day   ibuprofen 600 mg oral tablet: 1 tab(s) orally every 8 hours   Lipitor 20 mg oral tablet: 1 tab(s) orally once a day  PHENobarbital 32.4 mg oral tablet: take 1 tablet in the morning and 2 tablets before sleep MDD:97.2 mg

## 2022-04-04 NOTE — CHART NOTE - NSCHARTNOTEFT_GEN_A_CORE
PACU ANESTHESIA ADMISSION NOTE      Procedure: Transluminal mechanical thrombectomy of vein    Venogram femoral and other lower limb veins using contrast material      Post op diagnosis:  DVT, lower extremity        ____  Intubated  TV:______       Rate: ______      FiO2: ______    __x__  Patent Airway    __x__  Full return of protective reflexes    __x__  Full recovery from anesthesia / back to baseline         Mental Status:  _x___ Awake   __x___ Alert   _____ Drowsy   _____ Sedated    Nausea/Vomiting:  _x___ NO  ______Yes,   __x__ See Post - Op Orders          Pain Scale (0-10):  __0___    Treatment: ____ None    __x__ See Post - Op/PCA Orders    Post - Operative Fluids:   ____ Oral   __x__ See Post - Op Orders    Plan: Discharge:   ____Home       __x__Floor     _____Critical Care    _____  Other:_________________    Comments: When parameters met.
Post Operative Note  Patient: BEAU WONG 66y (1955) Male   MRN: [id number]  Location: [address]  Visit: 04-01-22 Inpatient  Date: 04-04-22 @ 16:38    Procedure: DVT, lower extremity     S/P Transluminal mechanical thrombectomy of vein    Venogram femoral and other lower limb veins using contrast material        Subjective:   Nausea: no, Vomiting: no, Ambulating: no, Flatus: no  Pain Assessment: Patient is complaining of no pain.     Objective:  Vitals: T(F): 96.3 (04-04-22 @ 15:58), Max: 98.9 (04-04-22 @ 00:00)  HR: 69 (04-04-22 @ 15:58)  BP: 93/52 (04-04-22 @ 15:58) (88/54 - 102/58)  RR: 18 (04-04-22 @ 15:58)  SpO2: 98% (04-04-22 @ 14:30)  Vent Settings:     In:   04-04-22 @ 07:01  -  04-04-22 @ 16:38  --------------------------------------------------------  IN: 448 mL      IV Fluids: lactated ringers. 1000 milliLiter(s) (100 mL/Hr) IV Continuous <Continuous>      Out:   04-04-22 @ 07:01  -  04-04-22 @ 16:38  --------------------------------------------------------  OUT: 0 mL      EBL:     Voided Urine:   04-04-22 @ 07:01  -  04-04-22 @ 16:38  --------------------------------------------------------  OUT: 0 mL        Physical Examination:  General Appearance: NAD,   HEENT: EOMI, sclera non-icteric.  Heart: RRR  Lungs: CTABL  Abdomen:  Soft, non tender, appropriate for post-op course, nondistended. No rigidity, guarding, or rebound tenderness.   MSK/Extremities: Warm & well-perfused. Peripheral pulses intact.  Skin: Warm, dry. No jaundice.   Incisions/Wounds: Dressings in place, clean, dry and intact, no signs of infection/active bleeding/drainage    Medications: [Standing]  acetaminophen     Tablet .. 650 milliGRAM(s) Oral every 6 hours  atorvastatin 20 milliGRAM(s) Oral at bedtime  chlorhexidine 4% Liquid 1 Application(s) Topical <User Schedule>  diVALproex  milliGRAM(s) Oral <User Schedule>  enoxaparin Injectable 80 milliGRAM(s) SubCutaneous every 12 hours  lactated ringers. 1000 milliLiter(s) IV Continuous <Continuous>  ondansetron Injectable 4 milliGRAM(s) IV Push every 6 hours PRN  pantoprazole    Tablet 40 milliGRAM(s) Oral before breakfast  phenytoin   Capsule 100 milliGRAM(s) Oral two times a day    Medications: [PRN]  acetaminophen     Tablet .. 650 milliGRAM(s) Oral every 6 hours  atorvastatin 20 milliGRAM(s) Oral at bedtime  chlorhexidine 4% Liquid 1 Application(s) Topical <User Schedule>  diVALproex  milliGRAM(s) Oral <User Schedule>  enoxaparin Injectable 80 milliGRAM(s) SubCutaneous every 12 hours  lactated ringers. 1000 milliLiter(s) IV Continuous <Continuous>  ondansetron Injectable 4 milliGRAM(s) IV Push every 6 hours PRN  pantoprazole    Tablet 40 milliGRAM(s) Oral before breakfast  phenytoin   Capsule 100 milliGRAM(s) Oral two times a day      DVT PROPHYLAXIS: enoxaparin Injectable 80 milliGRAM(s) SubCutaneous every 12 hours    GI PROPHYLAXIS: pantoprazole    Tablet 40 milliGRAM(s) Oral before breakfast    ANTICOAGULATION:   ANTIBIOTICS:        Labs:                        12.7   7.32  )-----------( 175      ( 04 Apr 2022 06:40 )             38.1     04-03    142  |  106  |  15  ----------------------------<  92  4.7   |  23  |  0.9    Ca    8.6      03 Apr 2022 23:30  Phos  3.6     04-03  Mg     2.1     04-03      PT/INR - ( 03 Apr 2022 23:30 )   PT: 11.50 sec;   INR: 1.00 ratio         PTT - ( 03 Apr 2022 23:30 )  PTT:53.4 sec        Imaging:  No post-op imaging studies      Plan:    - Monitor vitals  - Start Eliquis  - Continue Pain Medications   - Encourage ambulation as tolerated  - Monitor urine output   - DVT and GI Prophylaxis  - Monitor wound   - Plan for discharge       Date/Time: 04-04-22 @ 16:38

## 2022-04-04 NOTE — PROGRESS NOTE ADULT - REASON FOR ADMISSION
Extensive Left lower extremity DVT

## 2022-04-04 NOTE — DISCHARGE NOTE PROVIDER - CARE PROVIDER_API CALL
Julio Rodriguez)  Surgery; Vascular Surgery  [address] [place] [address]  Phone: [phone]  Fax: [phone]  Follow Up Time: 2 weeks    Maryuri Correa)  Hematology; Internal Medicine; Medical Oncology  [address] [place] [address]  Phone: [phone]  Fax: [phone]  Follow Up Time: 1 week

## 2022-04-04 NOTE — DISCHARGE NOTE PROVIDER - CARE PROVIDERS DIRECT ADDRESSES
[email or web address]

## 2022-04-04 NOTE — DISCHARGE NOTE NURSING/CASE MANAGEMENT/SOCIAL WORK - NSDCPEFALRISK_GEN_ALL_CORE
For information on Fall & Injury Prevention, visit: [email or web address] OR  [email or web address] OR  https://www.cdc.gov/steadi/patient.html

## 2022-04-04 NOTE — DISCHARGE NOTE PROVIDER - NSDCCPCAREPLAN_GEN_ALL_CORE_FT
PRINCIPAL DISCHARGE DIAGNOSIS  Diagnosis: DVT, lower extremity  Assessment and Plan of Treatment: S/P Left lower extremity venogram and mechanical thrombectomy

## 2022-04-04 NOTE — BRIEF OPERATIVE NOTE - NSICDXBRIEFPROCEDURE_GEN_ALL_CORE_FT
PROCEDURES:  Transluminal mechanical thrombectomy of vein 04-Apr-2022 13:22:33  Rosendo Salazar  Venogram femoral and other lower limb veins using contrast material 04-Apr-2022 13:23:35  Rosendo Salazar

## 2022-04-04 NOTE — DISCHARGE NOTE NURSING/CASE MANAGEMENT/SOCIAL WORK - PATIENT PORTAL LINK FT
You can access the FollowMyHealth Patient Portal offered by [facility] by registering at the following website: [email or web address]. By joining [employer]’s FollowMyHealth portal, you will also be able to view your health information using other applications (apps) compatible with our system.

## 2022-04-06 LAB — SURGICAL PATHOLOGY STUDY: SIGNIFICANT CHANGE UP

## 2022-04-11 ENCOUNTER — APPOINTMENT (OUTPATIENT)
Dept: NEUROLOGY | Facility: CLINIC | Age: 67
End: 2022-04-11
Payer: MEDICARE

## 2022-04-11 VITALS
HEIGHT: 73 IN | HEART RATE: 87 BPM | TEMPERATURE: 97.8 F | DIASTOLIC BLOOD PRESSURE: 60 MMHG | WEIGHT: 160 LBS | BODY MASS INDEX: 21.2 KG/M2 | OXYGEN SATURATION: 97 % | SYSTOLIC BLOOD PRESSURE: 110 MMHG

## 2022-04-11 DIAGNOSIS — D64.9 ANEMIA, UNSPECIFIED: ICD-10-CM

## 2022-04-11 DIAGNOSIS — I82.409 ACUTE EMBOLISM AND THROMBOSIS OF UNSPECIFIED DEEP VEINS OF UNSPECIFIED LOWER EXTREMITY: ICD-10-CM

## 2022-04-11 DIAGNOSIS — I82.412 ACUTE EMBOLISM AND THROMBOSIS OF LEFT FEMORAL VEIN: ICD-10-CM

## 2022-04-11 DIAGNOSIS — I82.422 ACUTE EMBOLISM AND THROMBOSIS OF LEFT ILIAC VEIN: ICD-10-CM

## 2022-04-11 DIAGNOSIS — F17.200 NICOTINE DEPENDENCE, UNSPECIFIED, UNCOMPLICATED: ICD-10-CM

## 2022-04-11 DIAGNOSIS — I10 ESSENTIAL (PRIMARY) HYPERTENSION: ICD-10-CM

## 2022-04-11 DIAGNOSIS — I82.432 ACUTE EMBOLISM AND THROMBOSIS OF LEFT POPLITEAL VEIN: ICD-10-CM

## 2022-04-11 DIAGNOSIS — Z87.898 PERSONAL HISTORY OF OTHER SPECIFIED CONDITIONS: ICD-10-CM

## 2022-04-11 DIAGNOSIS — G40.909 EPILEPSY, UNSPECIFIED, NOT INTRACTABLE, WITHOUT STATUS EPILEPTICUS: ICD-10-CM

## 2022-04-11 PROCEDURE — 99214 OFFICE O/P EST MOD 30 MIN: CPT

## 2022-04-11 RX ORDER — ATORVASTATIN CALCIUM 20 MG/1
20 TABLET, FILM COATED ORAL
Qty: 30 | Refills: 0 | Status: ACTIVE | COMMUNITY
Start: 2022-02-10

## 2022-04-11 RX ORDER — UMECLIDINIUM BROMIDE AND VILANTEROL TRIFENATATE 62.5; 25 UG/1; UG/1
62.5-25 POWDER RESPIRATORY (INHALATION)
Qty: 60 | Refills: 0 | Status: ACTIVE | COMMUNITY
Start: 2021-10-25

## 2022-04-12 NOTE — PHYSICAL EXAM
[Chronically Ill] : chronically ill [Appears Older] : appears older than stated age [Person] : oriented to person [Place] : oriented to place [Time] : oriented to time [Concentration Intact] : normal concentrating ability [Naming Objects] : no difficulty naming common objects [Cranial Nerves Optic (II)] : visual acuity intact bilaterally,  visual fields full to confrontation, pupils equal round and reactive to light [Cranial Nerves Oculomotor (III)] : extraocular motion intact [Cranial Nerves Trigeminal (V)] : facial sensation intact symmetrically [Cranial Nerves Facial (VII)] : face symmetrical [Cranial Nerves Vestibulocochlear (VIII)] : hearing was intact bilaterally [Cranial Nerves Glossopharyngeal (IX)] : tongue and palate midline [Cranial Nerves Accessory (XI - Cranial And Spinal)] : head turning and shoulder shrug symmetric [Cranial Nerves Hypoglossal (XII)] : there was no tongue deviation with protrusion [Motor Handedness Left-Handed] : the patient is left hand dominant [Tremor] : a tremor present [2+] : Ankle jerk left 2+ [Short Term Intact] : short term memory impaired [Paresis Pronator Drift Right-Sided] : no pronator drift on the right [Paresis Pronator Drift Left-Sided] : no pronator drift on the left [Motor Strength Upper Extremities Bilaterally] : strength was normal in both upper extremities [Motor Strength Lower Extremities Bilaterally] : strength was normal in both lower extremities [Limited Balance] : balance was intact [Past-pointing] : there was no past-pointing [Coordination - Dysmetria Impaired Finger-to-Nose Bilateral] : not present [FreeTextEntry6] : Head tilt to the left

## 2022-04-12 NOTE — DISCUSSION/SUMMARY
[Safety Recommendations] : The patient was advised in regards to the risk of seizures and general seizure safety recommendations including not to be bathing alone, climbing to high places and operating heavy machinery. [Compliance with Medications] : The importance of compliance with medications was reinforced. [Medication Side Effects] : High frequency and serious potential medication adverse effects were reviewed with the patient, including but not exclusive to psychiatric effects.  Information sheets on medication side effects were made available to the patient in our clinic.  The patient or advocate agrees to notify us for any concerns. [Sleep Hygiene/Sleep Disruption Risks] : Sleep hygiene and the risks of sleep disruption were discussed. [Inpatient video EEG study ordered with length of stay anticipated in days: _____] : Inpatient video EEG study ordered with length of stay anticipated in days: [unfilled] [Evaluation for interictal epileptiform activity, subclinical seizures, and risk stratification (typically 2-3 days)] : Evaluation for interictal epileptiform activity, subclinical seizures, and risk stratification (typically 2-3 days) [FreeTextEntry1] : Mike is a 66 year old left-handed Male with PMH HTN,Alcohol abuse,LE DVT, DLD and seizure disorder presented to the office for seizure management. \par \par Plan:\par - REEG, followed by VEEG, pt is agreeable to work up considering countless refusal in the past\par - Rx given for blood work to be done ASAP\par - MRI of the brain \par - follow up after VEEG\par - Continue same medication at this point\par \par Case discussed with Dr. Casas\par \par Shana Batista, DNP, ACNP-BC

## 2022-04-12 NOTE — HISTORY OF PRESENT ILLNESS
[FreeTextEntry1] : Mike is a 66 year old left-handed Male with PMH HTN,Alcohol abuse,LE DVT, DLD and seizure disorder presented to the office for seizure management. \par Pt is a poor historian accompanied by nephew that also doesn’t know anything about the pt. Spoke with sister on the phone who provided some information. EMR reviewed.\par Pt had several ER visits for breakthrough seizures but most times left AMA without further work up. \par Pt has been having seizures since he was a child, onset and cause unclear. Typical event starts out as spacing out and quickly progresses to fall and GTC activity for about 1-2 min with 5-10 min post ictal confusion. \par Pt used to live in [place] with his wife but few years ago moved to [place] . Pt was seen at [facility] clinic few times. \par Last seizure approx. 6 months ago.\par \par Current medications:\par Dilantin 200mg AM & 300mg HS\par [id number] mg BID \par Used to also take Phenobarbital which was stopped(unclear why and by who).\par \par Social History:\par Lives with sister\par Not employed\par Did not finish HS due to seizures(as per pt)\par Used to work as a  in [place] \par Smokes daily, history of alcohol abuse\par \par \par Risk factors:\par Birth history is not available\par No family history of seizure disorder\par \par \par No recent EEGs.\par VPA level 10/2021 46\par \par \par \par \par \par \par \par

## 2022-04-15 ENCOUNTER — APPOINTMENT (OUTPATIENT)
Dept: NEUROLOGY | Facility: CLINIC | Age: 67
End: 2022-04-15
Payer: MEDICARE

## 2022-04-15 PROCEDURE — 95816 EEG AWAKE AND DROWSY: CPT

## 2022-04-20 ENCOUNTER — APPOINTMENT (OUTPATIENT)
Dept: VASCULAR SURGERY | Facility: CLINIC | Age: 67
End: 2022-04-20
Payer: MEDICARE

## 2022-04-20 VITALS
SYSTOLIC BLOOD PRESSURE: 130 MMHG | BODY MASS INDEX: 21.87 KG/M2 | DIASTOLIC BLOOD PRESSURE: 80 MMHG | HEIGHT: 73 IN | WEIGHT: 165 LBS

## 2022-04-20 PROCEDURE — 93971 EXTREMITY STUDY: CPT

## 2022-04-20 PROCEDURE — 99214 OFFICE O/P EST MOD 30 MIN: CPT

## 2022-05-31 ENCOUNTER — INPATIENT (INPATIENT)
Facility: HOSPITAL | Age: 67
LOS: 2 days | Discharge: HOME | End: 2022-06-03
Attending: HOSPITALIST | Admitting: HOSPITALIST
Payer: MEDICARE

## 2022-05-31 VITALS
SYSTOLIC BLOOD PRESSURE: 103 MMHG | RESPIRATION RATE: 18 BRPM | TEMPERATURE: 97 F | HEIGHT: 73 IN | DIASTOLIC BLOOD PRESSURE: 63 MMHG | HEART RATE: 74 BPM | WEIGHT: 182.54 LBS

## 2022-05-31 DIAGNOSIS — I82.409 ACUTE EMBOLISM AND THROMBOSIS OF UNSPECIFIED DEEP VEINS OF UNSPECIFIED LOWER EXTREMITY: ICD-10-CM

## 2022-05-31 DIAGNOSIS — R09.89 OTHER SPECIFIED SYMPTOMS AND SIGNS INVOLVING THE CIRCULATORY AND RESPIRATORY SYSTEMS: ICD-10-CM

## 2022-05-31 DIAGNOSIS — G40.909 EPILEPSY, UNSPECIFIED, NOT INTRACTABLE, WITHOUT STATUS EPILEPTICUS: ICD-10-CM

## 2022-05-31 LAB
24R-OH-CALCIDIOL SERPL-MCNC: 26 NG/ML — LOW (ref 30–80)
ALBUMIN SERPL ELPH-MCNC: 3.7 G/DL — SIGNIFICANT CHANGE UP (ref 3.5–5.2)
ALP SERPL-CCNC: 62 U/L — SIGNIFICANT CHANGE UP (ref 30–115)
ALT FLD-CCNC: 17 U/L — SIGNIFICANT CHANGE UP (ref 0–41)
ANION GAP SERPL CALC-SCNC: 9 MMOL/L — SIGNIFICANT CHANGE UP (ref 7–14)
AST SERPL-CCNC: 17 U/L — SIGNIFICANT CHANGE UP (ref 0–41)
BILIRUB SERPL-MCNC: <0.2 MG/DL — SIGNIFICANT CHANGE UP (ref 0.2–1.2)
BUN SERPL-MCNC: 15 MG/DL — SIGNIFICANT CHANGE UP (ref 10–20)
CALCIUM SERPL-MCNC: 8.2 MG/DL — LOW (ref 8.5–10.1)
CHLORIDE SERPL-SCNC: 106 MMOL/L — SIGNIFICANT CHANGE UP (ref 98–110)
CO2 SERPL-SCNC: 27 MMOL/L — SIGNIFICANT CHANGE UP (ref 17–32)
CREAT SERPL-MCNC: 1 MG/DL — SIGNIFICANT CHANGE UP (ref 0.7–1.5)
EGFR: 84 ML/MIN/1.73M2 — SIGNIFICANT CHANGE UP
GLUCOSE SERPL-MCNC: 91 MG/DL — SIGNIFICANT CHANGE UP (ref 70–99)
HCT VFR BLD CALC: 35.4 % — LOW (ref 42–52)
HGB BLD-MCNC: 11.4 G/DL — LOW (ref 14–18)
MAGNESIUM SERPL-MCNC: 2.1 MG/DL — SIGNIFICANT CHANGE UP (ref 1.8–2.4)
MCHC RBC-ENTMCNC: 29.8 PG — SIGNIFICANT CHANGE UP (ref 27–31)
MCHC RBC-ENTMCNC: 32.2 G/DL — SIGNIFICANT CHANGE UP (ref 32–37)
MCV RBC AUTO: 92.4 FL — SIGNIFICANT CHANGE UP (ref 80–94)
NRBC # BLD: 0 /100 WBCS — SIGNIFICANT CHANGE UP (ref 0–0)
PHENYTOIN FREE SERPL-MCNC: 14.3 UG/ML — SIGNIFICANT CHANGE UP (ref 10–20)
PLATELET # BLD AUTO: 163 K/UL — SIGNIFICANT CHANGE UP (ref 130–400)
POTASSIUM SERPL-MCNC: 4.2 MMOL/L — SIGNIFICANT CHANGE UP (ref 3.5–5)
POTASSIUM SERPL-SCNC: 4.2 MMOL/L — SIGNIFICANT CHANGE UP (ref 3.5–5)
PROT SERPL-MCNC: 5.6 G/DL — LOW (ref 6–8)
RBC # BLD: 3.83 M/UL — LOW (ref 4.7–6.1)
RBC # FLD: 14.3 % — SIGNIFICANT CHANGE UP (ref 11.5–14.5)
SODIUM SERPL-SCNC: 142 MMOL/L — SIGNIFICANT CHANGE UP (ref 135–146)
VALPROATE SERPL-MCNC: 23 UG/ML — LOW (ref 50–100)
WBC # BLD: 6.01 K/UL — SIGNIFICANT CHANGE UP (ref 4.8–10.8)
WBC # FLD AUTO: 6.01 K/UL — SIGNIFICANT CHANGE UP (ref 4.8–10.8)

## 2022-05-31 PROCEDURE — 93970 EXTREMITY STUDY: CPT | Mod: 26

## 2022-05-31 PROCEDURE — 99222 1ST HOSP IP/OBS MODERATE 55: CPT

## 2022-05-31 PROCEDURE — 99221 1ST HOSP IP/OBS SF/LOW 40: CPT

## 2022-05-31 RX ORDER — CHLORHEXIDINE GLUCONATE 213 G/1000ML
1 SOLUTION TOPICAL
Refills: 0 | Status: DISCONTINUED | OUTPATIENT
Start: 2022-05-31 | End: 2022-06-03

## 2022-05-31 RX ORDER — DIVALPROEX SODIUM 500 MG/1
750 TABLET, DELAYED RELEASE ORAL EVERY 12 HOURS
Refills: 0 | Status: DISCONTINUED | OUTPATIENT
Start: 2022-05-31 | End: 2022-06-03

## 2022-05-31 RX ORDER — ATORVASTATIN CALCIUM 80 MG/1
20 TABLET, FILM COATED ORAL AT BEDTIME
Refills: 0 | Status: DISCONTINUED | OUTPATIENT
Start: 2022-06-01 | End: 2022-06-03

## 2022-05-31 RX ORDER — APIXABAN 2.5 MG/1
5 TABLET, FILM COATED ORAL EVERY 12 HOURS
Refills: 0 | Status: DISCONTINUED | OUTPATIENT
Start: 2022-05-31 | End: 2022-06-03

## 2022-05-31 RX ORDER — VALPROIC ACID (AS SODIUM SALT) 250 MG/5ML
750 SOLUTION, ORAL ORAL
Refills: 0 | Status: DISCONTINUED | OUTPATIENT
Start: 2022-05-31 | End: 2022-05-31

## 2022-05-31 RX ADMIN — APIXABAN 5 MILLIGRAM(S): 2.5 TABLET, FILM COATED ORAL at 17:28

## 2022-05-31 RX ADMIN — DIVALPROEX SODIUM 750 MILLIGRAM(S): 500 TABLET, DELAYED RELEASE ORAL at 21:20

## 2022-05-31 RX ADMIN — Medication 100 MILLIGRAM(S): at 21:20

## 2022-05-31 NOTE — H&P ADULT - HISTORY OF PRESENT ILLNESS
66 y/o male Seizure disorder since 19 y/o.  - Patient states " I am here for a test for my head"  - Since patient is a poor historian I spoke to his care giver at home for information: Cornel  - He states patient was referred for by his PMD to a Neurologist for a check up. He has not had his medications for Seizure control evaluated in a long time.  - Last seizure was about 6-7 months ago: no recent seizures, compliant with meds.  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

## 2022-05-31 NOTE — H&P ADULT - NSHPPHYSICALEXAM_GEN_ALL_CORE
Vital Signs Last 24 Hrs    T(F): 97 (05-31-22 @ 12:15), Max: 97 (05-31-22 @ 12:15)  HR: 74 (05-31-22 @ 12:15) (74 - 74)  BP: 103/63 (05-31-22 @ 12:15)  RR: 18 (05-31-22 @ 12:15) (18 - 18)    PHYSICAL EXAM:      Constitutional: NAD, A&O x3    Eyes: PERRLA    Respiratory: +air entry, no rales, no rhonchi, no wheezes    Cardiovascular: +S1 and S2, regular rate and rhythm    Gastrointestinal: +BS, soft, non-tender, not distended    Extremities:  **  Swollen left leg down to the foot, no calf tenderness    Vascular: +dorsal pedis and radial pulses, no extremity cyanosis    Neurological: sensation intact, ROM equal B/L, CN II-XII intact    Skin: no rashes, normal turgor

## 2022-05-31 NOTE — H&P ADULT - NS ATTEND AMEND GEN_ALL_CORE FT
65 y/o male patient with pmh of seizure d/o,  LLE DVT diagnosed on 4/1/22, s/p Thrombectomy and on Eliquis     Pt is a poor historian , he stated that he is here for his leg and then he stated to get brain studies, He denies recent seizure, I tried to call the contact number but no answer.   On exam General awake, alert, but with periods of confusion  NAD, Lungs clear to ausculation b/l, Heart regular rhythm, Abdomen: soft, non tender non distended, Ext no edema R , but ++Left leg  .   - VEEG as per Neuro   Neurochecks   Antiepileptic as per Neuro   Pt is on Eliquis for DVT left leg   Get labs   - As per Hem/onc Note 4/2/2022 - the patient had unprovoked and recommended work up to rule out cancer - consider work up at least outpatient .   and Hem/onc follow up   - Persistent left leg edema will repeat left Leg Venous duplex 67 y/o male patient with pmh of seizure d/o,  LLE DVT diagnosed on 4/1/22, s/p Thrombectomy and on Eliquis     Pt is a poor historian , he stated that he is here for his leg and then he stated to get brain studies, He denies recent seizure, I tried to call the contact number but no answer.   On exam General awake, alert, but with periods of confusion  NAD, Lungs clear to ausculation b/l, Heart regular rhythm, Abdomen: soft, non tender non distended, Ext no edema R , but ++Left leg  .   - VEEG as per Neuro   Neurochecks   Antiepileptic as per Neuro   Pt is on Eliquis for DVT left leg   Get labs   - As per Hem/onc Note 4/2/2022 - the patient had unprovoked DVT and recommended work up to rule out cancer - consider work up at least outpatient .   and Hem/onc follow up   - Persistent left leg edema will repeat left Leg Venous duplex

## 2022-05-31 NOTE — CONSULT NOTE ADULT - NS ATTEND AMEND GEN_ALL_CORE FT
Agree with the Hx and the plan  He is here for risk assessment, medication adjustment  No event suggestive of seizure for the last > 6 months  The goal is to take him off the Dilantin  will start the monitoring  will do AED level  seizure precaution

## 2022-05-31 NOTE — PATIENT PROFILE ADULT - FALL HARM RISK - HARM RISK INTERVENTIONS
[facility] [id number]  [id number]  [facility] [id number] [phone]               March 10, 2020      Re: Louise Cedeno  [address]  [place] [address]      This is to certify that Louise Cedeno has been under my care and may return to SCHOOL on Wednesday 03/11/20. Please excuse her from missing school Tuesday afternoon.             SIGNATURE_____________________________________________, 3/10/2020  Musa Mendez MD       Assistance with ambulation/Assistance OOB with selected safe patient handling equipment/Communicate Risk of Fall with Harm to all staff/Reinforce activity limits and safety measures with patient and family/Tailored Fall Risk Interventions/Visual Cue: Yellow wristband and red socks/Bed in lowest position, wheels locked, appropriate side rails in place/Call bell, personal items and telephone in reach/Instruct patient to call for assistance before getting out of bed or chair/Non-slip footwear when patient is out of bed/[place] to call system/Physically safe environment - no spills, clutter or unnecessary equipment/Purposeful Proactive Rounding/Room/bathroom lighting operational, light cord in reach

## 2022-05-31 NOTE — H&P ADULT - ASSESSMENT
68 y/o male with Seizure disorder since 19 yo admitted for Neurologic evaluation and review of present anti- seizure meds.

## 2022-05-31 NOTE — CONSULT NOTE ADULT - SUBJECTIVE AND OBJECTIVE BOX
Neurology/Epilepsy Consult:    BEAU WONG 67y Male  MRN-[id number]    Patient is a 67y old  Male who presents with a chief complaint of Evaluation of present seizure treatment (31 May 2022 12:11)        HPI:                               66 y/o male Seizure disorder since 19 y/o.  - Patient states " I am here for a test for my head"  - Since patient is a poor historian I spoke to his care giver at home for information: Cornel  - He states patient was referred for by his PMD to a Neurologist for a check up. He has not had his medications for Seizure control evaluated in a long time.  - Last seizure was about 6-7 months ago: no recent seizures, compliant with meds.  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 (31 May 2022 12:11)        PAST MEDICAL & SURGICAL HISTORY:  Seizure      DVT, lower extremity  Left leg      No significant past surgical history            FAMILY HISTORY:        Social History:          Risk factors:  Born full-term, , no complications  Normal growth and development  No febrile seizures/CNS infections  No head trauma with loss of consciousness      Allergy:  No Known Allergies        Home Medications:  Lipitor 20 mg oral tablet: 1 tab(s) orally once a day (31 May 2022 12:19)        MEDICATIONS  (STANDING):  apixaban 5 milliGRAM(s) Oral every 12 hours  chlorhexidine 4% Liquid 1 Application(s) Topical <User Schedule>  diVALproex  milliGRAM(s) Oral every 12 hours  phenytoin   Capsule 300 milliGRAM(s) Oral <User Schedule>    MEDICATIONS  (PRN):  LORazepam   Injectable 2 milliGRAM(s) IV Push three times a day PRN generalized tonic-clonic seizure lasting longer than 2 minutes        Review of systems:    Constitutional: No fever, weight loss or fatigue    Eyes: No eye pain or discharge  ENMT:  No difficulty hearing; No sinus or throat pain  Neck: No pain or stiffness  Respiratory: No cough, wheezing, chills or hemoptysis  Cardiovascular: No chest pain, palpitations, shortness of breath, dyspnea on exertion  Gastrointestinal: No abdominal pain, nausea, vomiting or hematemesis; No diarrhea or constipation.   Genitourinary: No dysuria, frequency, hematuria or incontinence  Neurological: As per HPI  Skin: No rashes or lesions   Endocrine: No heat or cold intolerance; No hair loss  Musculoskeletal: No joint pain or swelling  Psychiatric: No depression, anxiety, mood swings  Heme/Lymph: No easy bruising or bleeding gums        T(F): 96.8 (22 @ 13:46), Max: 97 (22 @ 12:15)  HR: 72 (22 @ 13:46) (72 - 74)  BP: 105/68 (22 @ 13:46) (103/63 - 105/68)  RR: 18 (22 @ 13:46) (18 - 18)  SpO2: --        Neurologic Examination:  General:  Appearance is consistent with chronologic age.  No abnormal facies.   General: The patient is oriented to person, place, time and date.  Recent and remote memory intact.  Follows 4-step directions. Fund of knowledge is intact and normal.  Language with normal repetition, comprehension and naming.  Nondysarthric.    Cranial nerves: EOMI w/o nystagmus, skew or reported double vision.  PERRL.  No ptosis/weakness of eyelid closure.  Facial sensation is normal with normal bite.  No facial asymmetry.  Hearing grossly intact b/l.  Palate elevates midline.  Tongue midline.  Motor examination:   Normal tone, bulk and range of motion.  No tenderness, twitching, tremors or involuntary movements.  Formal Muscle Strength Testin/5 UE; 5/5 LE.  No observable drift.  Reflexes:   2+ b/l pectoralis, biceps, triceps, brachioradialis, patella and Achilles.  Plantar response downgoing b/l.  Jaw jerk, Shasta, clonus absent.  Sensory examination:   Intact to light touch and pinprick, pain, temperature and proprioception and vibration in all extremities.  Cerebellum:   FTN/HKS intact with normal BIJU in all limbs.  No dysmetria or dysdiadokinesia.  Gait narrow based and normal.        Labs:   2022 COVID-19 PCR negative          Neuroimaging:  CT Head:   < from: CT Head No Cont (12..19 @ 20:05) >  FINDINGS:    The cortical sulci and ventricular system are appropriate for patient's   stated age.     No acute intracranial hemorrhage. No mass effect, midline shift, or   intracranial fluid collection.     Gray-white differentiation is maintained.    The calvarium is intact. There is no soft tissue swelling.     The visualized paranasal sinuses and mastoid air cells are well aerated.    IMPRESSION:  No evidence of acute intracranial pathology.    < end of copied text >          VEEG  - 2019 -   < from: EEG w/ Video Monitoring Each 24 hours (19 @ 10:00) >  GENERALIZED SLOWING  Borderline to mild generalized slowing.     FOCAL SLOWING  Mild right hemispheric focal slowing.       BREACH ARTIFACT  No breach artifact      Activation Procedure  Hyper Ventilation/Photic Stimulation  Was not performed.       Sleep Deprevation/Medication Reduction  Was not performed      EPILEPTIFORM ACTIVITY  Small number of right frontotemporal sharps and sharp transients      FREQUENCY  As above.       AREA OF ACTIVITY  As above.       EVENTS/SEIZURES  No events reported or recorded. In one occasion he appeared agitated and pulled electrodes off there was no clinical or subclinical seizures prior to that      IMPRESSIONS  Abnormal video EEG monitoring due to the presence of focal and generalized slowing as described above and also abnormal interictal activity as described above      CLINICAL CORRELATION  Consistent with diffuse and also right hemispheric focal electrophysiological dysfunction secondary to structural and toxic metabolic causes. The recording also shows evidence for potential for partial seizure from the right hemisphere..      < end of copied text >      Assessment:  This is a 67y Male with h/o         Discussed with Dr. Casas        Plan:   - VEEG monitoring for better characterization and assessment  - Seizure precautions  - Ativan 2mg IV PRN for generalized tonic-clonic seizure lasting longer than 2 minutes, or two consecutive seizures without return to baseline in-between (ordered)  - CBC, CMP, Mg, CK, AED levels trough (ordered)  - Keep Mg above 2    Plan discussed with patient in details, all questions answered.    Discussed with nursing team, hospitalist, and PA. Neurology/Epilepsy Consult:    BEAU WONG 67y Male  MRN-[id number]    Patient is a 67y old left-handed Male who presents for elective VEEG monitoring        HPI: Attempted to obtain history from patient, but he is not the best historian. History obtained from nephew Maik [phone], EMR and outpatient records reviewed.                                      PAST MEDICAL & SURGICAL HISTORY:  Seizure      DVT, lower extremity  Left leg      No significant past surgical history            FAMILY HISTORY:        Social History:          Risk factors:  Born full-term, , no complications  Normal growth and development  No febrile seizures/CNS infections  No head trauma with loss of consciousness      Allergy:  No Known Allergies        Home Medications:  Lipitor 20 mg oral tablet: 1 tab(s) orally once a day (31 May 2022 12:19)        MEDICATIONS  (STANDING):  apixaban 5 milliGRAM(s) Oral every 12 hours  chlorhexidine 4% Liquid 1 Application(s) Topical <User Schedule>  diVALproex  milliGRAM(s) Oral every 12 hours  phenytoin   Capsule 300 milliGRAM(s) Oral <User Schedule>    MEDICATIONS  (PRN):  LORazepam   Injectable 2 milliGRAM(s) IV Push three times a day PRN generalized tonic-clonic seizure lasting longer than 2 minutes        Review of systems:    Constitutional: No fever, weight loss or fatigue    Eyes: No eye pain or discharge  ENMT:  No difficulty hearing; No sinus or throat pain  Neck: No pain or stiffness  Respiratory: No cough, wheezing, chills or hemoptysis  Cardiovascular: No chest pain, palpitations, shortness of breath, dyspnea on exertion  Gastrointestinal: No abdominal pain, nausea, vomiting or hematemesis; No diarrhea or constipation.   Genitourinary: No dysuria, frequency, hematuria or incontinence  Neurological: As per HPI  Skin: No rashes or lesions   Endocrine: No heat or cold intolerance; No hair loss  Musculoskeletal: No joint pain or swelling  Psychiatric: No depression, anxiety, mood swings  Heme/Lymph: No easy bruising or bleeding gums        T(F): 96.8 (22 @ 13:46), Max: 97 (22 @ 12:15)  HR: 72 (22 @ 13:46) (72 - 74)  BP: 105/68 (22 @ 13:46) (103/63 - 105/68)  RR: 18 (05-31-22 @ 13:46) (18 - 18)  SpO2: --        Neurologic Examination:  General:  Appearance is consistent with chronologic age.  No abnormal facies.   General: The patient is oriented to person, place, time and date.  Recent and remote memory intact.  Follows 4-step directions. Fund of knowledge is intact and normal.  Language with normal repetition, comprehension and naming.  Nondysarthric.    Cranial nerves: EOMI w/o nystagmus, skew or reported double vision.  PERRL.  No ptosis/weakness of eyelid closure.  Facial sensation is normal with normal bite.  No facial asymmetry.  Hearing grossly intact b/l.  Palate elevates midline.  Tongue midline.  Motor examination:   Normal tone, bulk and range of motion.  No tenderness, twitching, tremors or involuntary movements.  Formal Muscle Strength Testin/5 UE; 5/5 LE.  No observable drift.  Reflexes:   2+ b/l pectoralis, biceps, triceps, brachioradialis, patella and Achilles.  Plantar response downgoing b/l.  Jaw jerk, Shasta, clonus absent.  Sensory examination:   Intact to light touch and pinprick, pain, temperature and proprioception and vibration in all extremities.  Cerebellum:   FTN/HKS intact with normal BIJU in all limbs.  No dysmetria or dysdiadokinesia.  Gait narrow based and normal.        Labs:   2022 COVID-19 PCR negative          Neuroimaging:  CT Head:   < from: CT Head No Cont (19 @ 20:05) >  FINDINGS:    The cortical sulci and ventricular system are appropriate for patient's   stated age.     No acute intracranial hemorrhage. No mass effect, midline shift, or   intracranial fluid collection.     Gray-white differentiation is maintained.    The calvarium is intact. There is no soft tissue swelling.     The visualized paranasal sinuses and mastoid air cells are well aerated.    IMPRESSION:  No evidence of acute intracranial pathology.    < end of copied text >          VEEG  - 2019 -   < from: EEG w/ Video Monitoring Each 24 hours (19 @ 10:00) >  GENERALIZED SLOWING  Borderline to mild generalized slowing.     FOCAL SLOWING  Mild right hemispheric focal slowing.       BREACH ARTIFACT  No breach artifact      Activation Procedure  Hyper Ventilation/Photic Stimulation  Was not performed.       Sleep Deprevation/Medication Reduction  Was not performed      EPILEPTIFORM ACTIVITY  Small number of right frontotemporal sharps and sharp transients      FREQUENCY  As above.       AREA OF ACTIVITY  As above.       EVENTS/SEIZURES  No events reported or recorded. In one occasion he appeared agitated and pulled electrodes off there was no clinical or subclinical seizures prior to that      IMPRESSIONS  Abnormal video EEG monitoring due to the presence of focal and generalized slowing as described above and also abnormal interictal activity as described above      CLINICAL CORRELATION  Consistent with diffuse and also right hemispheric focal electrophysiological dysfunction secondary to structural and toxic metabolic causes. The recording also shows evidence for potential for partial seizure from the right hemisphere..      < end of copied text >      Assessment:  This is a 67y Male with h/o         Discussed with Dr. Casas        Plan:   - VEEG monitoring for better characterization and assessment  - Seizure precautions  - Ativan 2mg IV PRN for generalized tonic-clonic seizure lasting longer than 2 minutes, or two consecutive seizures without return to baseline in-between (ordered)  - CBC, CMP, Mg, CK, AED levels trough (ordered)  - Keep Mg above 2    Plan discussed with patient in details, all questions answered.    Discussed with nursing team, hospitalist, and PA. Neurology/Epilepsy Consult:    BEAU WONG 67y Male  MRN-[id number]    Patient is a 67y old left-handed Male who presents for elective VEEG monitoring        HPI: Attempted to obtain history from patient, but he is not the best historian. History obtained from nephew Maik [phone], EMR and outpatient records reviewed.  Patient reports having h/o seizures since 21yo. He does not remember any of his seizures. Currently he  lives with sister and her family, prior to that lived with wife in [place] until she passed. He has HHA during the day. Patient knows the names of seizure medications he is taking, but does know the doses. He reports taking medications regularly, denies any missed doses. States took all of his seizure medications in am today prior to admission.   Patient was [facility] to [facility] seizures, but would often sign AMA prior to completing work up. He was admitted to EMU in 2019 (see report). At that point he was on Dilantin, Depakote, and phenobabrital. Per nephew, his phenobarbital was stopped last year by PMD.  Patient was recently evaluated by Dr. Casas for second opinion. He was recommended to complete blood work prior to this admission, but it was not done.                             PAST MEDICAL & SURGICAL HISTORY:  Epilepsy  HTN   DLD  DVT, left lower extremity        FAMILY HISTORY:  No seizures      Social History:  Lives with sister and nephew  Not employed        Allergy:  No Known Allergies        Home Medications:  Lipitor 20 mg oral tablet: 1 tab(s) orally once a day (31 May 2022 12:19)  Dilantin 100m capsules in the morning & 3 capsules at night  Depakote 250mg: 3 tablets every 12 hrs  Eliquis 5mg every 12 hrs          MEDICATIONS  (STANDING):  apixaban 5 milliGRAM(s) Oral every 12 hours  chlorhexidine 4% Liquid 1 Application(s) Topical <User Schedule>  diVALproex  milliGRAM(s) Oral every 12 hours  phenytoin   Capsule 300 milliGRAM(s) Oral <User Schedule>    MEDICATIONS  (PRN):  LORazepam   Injectable 2 milliGRAM(s) IV Push three times a day PRN generalized tonic-clonic seizure lasting longer than 2 minutes          T(F): 96.8 (22 @ 13:46), Max: 97 (22 @ 12:15)  HR: 72 (22 @ 13:46) (72 - 74)  BP: 105/68 (22 @ 13:46) (103/63 - 105/68)  RR: 18 (22 @ 13:46) (18 - 18)  SpO2: --        Neurologic Examination:  General:  Appearance is consistent with chronologic age.  No abnormal facies.   General: The patient is oriented to person, place, time and date.  Follows 3-step directions. Language with normal repetition, comprehension and naming.  Nondysarthric.    Cranial nerves: EOMI w/o nystagmus, skew or reported double vision.  PERRL.  No ptosis/weakness of eyelid closure.  Facial sensation is normal with normal bite.  No facial asymmetry.  Hearing grossly intact b/l.  Palate elevates midline.  Tongue midline.  Motor examination:   Normal tone, bulk and range of motion.  MIld action bilateral hand tremors.  Formal Muscle Strength Testin/5 UE; 5/5 LE.  No observable drift.  Reflexes:   2+ b/l   Sensory examination:   Intact to light touch and pinprick, pain,.  Cerebellum:  No dysmetria or dysdiadokinesia.  Gait is steady.        Labs:   2022 COVID-19 PCR negative          Neuroimaging:  CT Head:   < from: CT Head No Cont (19 @ 20:05) >  FINDINGS:    The cortical sulci and ventricular system are appropriate for patient's   stated age.     No acute intracranial hemorrhage. No mass effect, midline shift, or   intracranial fluid collection.     Gray-white differentiation is maintained.    The calvarium is intact. There is no soft tissue swelling.     The visualized paranasal sinuses and mastoid air cells are well aerated.    IMPRESSION:  No evidence of acute intracranial pathology.    < end of copied text >          REEG 4/15/2022 - borderline to mild right temporal slowing      VEEG  - 2019 -   < from: EEG w/ Video Monitoring Each 24 hours (19 @ 10:00) >  GENERALIZED SLOWING  Borderline to mild generalized slowing.     FOCAL SLOWING  Mild right hemispheric focal slowing.       BREACH ARTIFACT  No breach artifact      Activation Procedure  Hyper Ventilation/Photic Stimulation  Was not performed.       Sleep Deprivation/Medication Reduction  Was not performed      EPILEPTIFORM ACTIVITY  Small number of right frontotemporal sharps and sharp transients      FREQUENCY  As above.       AREA OF ACTIVITY  As above.       EVENTS/SEIZURES  No events reported or recorded. In one occasion he appeared agitated and pulled electrodes off there was no clinical or subclinical seizures prior to that      IMPRESSIONS  Abnormal video EEG monitoring due to the presence of focal and generalized slowing as described above and also abnormal interictal activity as described above      CLINICAL CORRELATION  Consistent with diffuse and also right hemispheric focal electrophysiological dysfunction secondary to structural and toxic metabolic causes. The recording also shows evidence for potential for partial seizure from the right hemisphere..      < end of copied text >          Assessment:  This is a 67y Male with h/o DLD, left leg DVT, alcohol abuse,, and seizure disorder diagnosed at 21 yo. He is reported to be seizure-free for over 6 months on Dilantin and Depakote. He was previously taking Phenobarbital as well, but pre family it was discontinued by PMD at some point in .        Discussed with Dr. Casas        Plan:   - VEEG monitoring for better characterization and assessment  - Seizure precautions  - COntinue home doses of seizure medications for now  - Ativan 2mg IV PRN for generalized tonic-clonic seizure lasting longer than 2 minutes, or two consecutive seizures without return to baseline in-between (ordered)  - CBC, CMP, Mg, AED levels trough   - Keep Mg above 2    Plan discussed with patient and family in details, all questions answered.    Discussed with nursing team and PA. Neurology/Epilepsy Consult:    BEAU WONG 67y Male  MRN-[id number]    Patient is a 67y old left-handed Male who presents for elective VEEG monitoring        HPI: Attempted to obtain history from patient, but he is not the best historian. History obtained from nephew Maik [phone], EMR and outpatient records reviewed.  Patient reports having h/o seizures since 19yo. He does not remember any of his seizures. Currently he  lives with sister and her family, prior to that lived with wife in [place] until she passed. He has HHA during the day. Patient knows the names of seizure medications he is taking, but does know the doses. He reports taking medications regularly, denies any missed doses. States took all of his seizure medications in am today prior to admission.   Patient was previously admitted to [facility] for seizures, but would often sign AMA prior to completing work up. He was admitted to EMU in 2019 (see report). At that point he was on Dilantin, Depakote, and phenobabrital. Per nephew, his phenobarbital was stopped last year by PMD.  Patient was hospitalized in 2022 and found to have left LE DVT, underwent thrombectomy.  Patient reports history of alcohol abuse in the past, denies suing alcohol for "many years."  Patient was recently evaluated by Dr. Casas for second opinion, previously was seen at [facility] clinic. He was recommended to complete blood work and MRI brain, but it was not done.                               PAST MEDICAL & SURGICAL HISTORY:  Epilepsy  HTN   DLD  DVT, left lower extremity  S/p LLE thrombectomy 2022      FAMILY HISTORY:  No seizures      Social History:  Lives with sister and nephew  Not employed        Allergy:  No Known Allergies        Home Medications:  Lipitor 20 mg oral tablet: 1 tab(s) orally once a day (31 May 2022 12:19)  Dilantin 100m capsules in the morning & 3 capsules at night  Depakote 250mg: 3 tablets every 12 hrs  Eliquis 5mg every 12 hrs          MEDICATIONS  (STANDING):  apixaban 5 milliGRAM(s) Oral every 12 hours  chlorhexidine 4% Liquid 1 Application(s) Topical <User Schedule>  diVALproex  milliGRAM(s) Oral every 12 hours  phenytoin   Capsule 300 milliGRAM(s) Oral <User Schedule>    MEDICATIONS  (PRN):  LORazepam   Injectable 2 milliGRAM(s) IV Push three times a day PRN generalized tonic-clonic seizure lasting longer than 2 minutes          T(F): 96.8 (22 @ 13:46), Max: 97 (22 @ 12:15)  HR: 72 (22 @ 13:46) (72 - 74)  BP: 105/68 (22 @ 13:46) (103/63 - 105/68)  RR: 18 (22 @ 13:46) (18 - 18)  SpO2: --        Neurologic Examination:  General:  Appearance is consistent with chronologic age.  No abnormal facies.   General: The patient is oriented to person, place, time and date.  Follows 3-step directions. Language with normal repetition, comprehension and naming.  Nondysarthric.    Cranial nerves: EOMI w/o nystagmus, skew or reported double vision.  PERRL.  No ptosis/weakness of eyelid closure.  Facial sensation is normal with normal bite.  No facial asymmetry.  Hearing grossly intact b/l.  Palate elevates midline.  Tongue midline.  Motor examination:   Normal tone, bulk and range of motion.  MIld action bilateral hand tremors.  Formal Muscle Strength Testin/5 UE; 5/5 LE.  No observable drift.  Reflexes:   2+ b/l   Sensory examination:   Intact to light touch and pinprick, pain,.  Cerebellum:  No dysmetria or dysdiadokinesia.  Gait is steady.        Labs:   2022 COVID-19 PCR negative          Neuroimaging:  CT Head:   < from: CT Head No Cont (..19 @ 20:05) >  FINDINGS:    The cortical sulci and ventricular system are appropriate for patient's   stated age.     No acute intracranial hemorrhage. No mass effect, midline shift, or   intracranial fluid collection.     Gray-white differentiation is maintained.    The calvarium is intact. There is no soft tissue swelling.     The visualized paranasal sinuses and mastoid air cells are well aerated.    IMPRESSION:  No evidence of acute intracranial pathology.    < end of copied text >          REEG 4/15/2022 - borderline to mild right temporal slowing      VEEG  - 2019 -   < from: EEG w/ Video Monitoring Each 24 hours (19 @ 10:00) >  GENERALIZED SLOWING  Borderline to mild generalized slowing.     FOCAL SLOWING  Mild right hemispheric focal slowing.       BREACH ARTIFACT  No breach artifact      Activation Procedure  Hyper Ventilation/Photic Stimulation  Was not performed.       Sleep Deprivation/Medication Reduction  Was not performed      EPILEPTIFORM ACTIVITY  Small number of right frontotemporal sharps and sharp transients      FREQUENCY  As above.       AREA OF ACTIVITY  As above.       EVENTS/SEIZURES  No events reported or recorded. In one occasion he appeared agitated and pulled electrodes off there was no clinical or subclinical seizures prior to that      IMPRESSIONS  Abnormal video EEG monitoring due to the presence of focal and generalized slowing as described above and also abnormal interictal activity as described above      CLINICAL CORRELATION  Consistent with diffuse and also right hemispheric focal electrophysiological dysfunction secondary to structural and toxic metabolic causes. The recording also shows evidence for potential for partial seizure from the right hemisphere..      < end of copied text >          Assessment:  This is a 67y Male with h/o DLD, left leg DVT, and seizure disorder diagnosed at 19 yo. He is reported to be seizure-free for over 6 months on Dilantin and Depakote. He was previously taking Phenobarbital as well, but pre family it was discontinued by PMD at some point in .        Discussed with Dr. Casas        Plan:   - VEEG monitoring for better characterization and assessment  - Seizure precautions  - COntinue home doses of seizure medications for now  - Ativan 2mg IV PRN for generalized tonic-clonic seizure lasting longer than 2 minutes, or two consecutive seizures without return to baseline in-between (ordered)  - CBC, CMP, Mg, AED levels trough, vitamin D  - Keep Mg above 2    Plan discussed with patient and family in details, all questions answered.    Discussed with nursing team and PA. Neurology/Epilepsy Consult:    BEAU WONG 67y Male  MRN-[id number]    Patient is a 67y old left-handed Male who presents for elective VEEG monitoring        HPI: Attempted to obtain history from patient, but he is not the best historian. History obtained from nephew Maik [phone], EMR and outpatient records reviewed.  Patient reports having h/o seizures since 19yo. He does not remember any of his seizures. Currently he  lives with sister and her family, prior to that lived with wife in [place] until she passed. He has HHA during the day. Patient knows the names of seizure medications he is taking, but does know the doses. He reports taking medications regularly, denies any missed doses. States took all of his seizure medications in am today prior to admission.   Patient was previously admitted to [facility] for seizures, but would often sign AMA prior to completing work up. He was admitted to EMU in 2019 (see report). At that point he was on Dilantin, Depakote, and phenobabrital. Per nephew, his phenobarbital was stopped last year by PMD. Prior seizures described as GTC events followed by prolonged confusion, last episode over 6 months ago.  Patient was hospitalized in 2022 and found to have left LE DVT, underwent thrombectomy.  Patient reports history of alcohol abuse in the past, denies suing alcohol for "many years."  Patient was recently evaluated by Dr. Casas for second opinion, previously was seen at [facility] clinic. He was recommended to complete blood work and MRI brain, but it was not done.                               PAST MEDICAL & SURGICAL HISTORY:  Epilepsy  HTN   DLD  DVT, left lower extremity  S/p LLE thrombectomy 2022      FAMILY HISTORY:  No seizures      Social History:  Lives with sister and nephew  Not employed  Alcohol abuse in the past        Allergy:  No Known Allergies        Home Medications:  Lipitor 20 mg oral tablet: 1 tab(s) orally once a day (31 May 2022 12:19)  Dilantin 100m capsules in the morning & 3 capsules at night  Depakote 250mg: 3 tablets every 12 hrs  Eliquis 5mg every 12 hrs          MEDICATIONS  (STANDING):  apixaban 5 milliGRAM(s) Oral every 12 hours  chlorhexidine 4% Liquid 1 Application(s) Topical <User Schedule>  diVALproex  milliGRAM(s) Oral every 12 hours  phenytoin   Capsule 300 milliGRAM(s) Oral <User Schedule>    MEDICATIONS  (PRN):  LORazepam   Injectable 2 milliGRAM(s) IV Push three times a day PRN generalized tonic-clonic seizure lasting longer than 2 minutes          T(F): 96.8 (22 @ 13:46), Max: 97 (22 @ 12:15)  HR: 72 (22 @ 13:46) (72 - 74)  BP: 105/68 (22 @ 13:46) (103/63 - 105/68)  RR: 18 (22 @ 13:46) (18 - 18)  SpO2: --        Neurologic Examination:  General:  Appearance is consistent with chronologic age.  No abnormal facies.   General: The patient is oriented to person, place, time and date.  Follows 3-step directions. Language with normal repetition, comprehension and naming.  Nondysarthric.    Cranial nerves: EOMI w/o nystagmus, skew or reported double vision.  PERRL.  No ptosis/weakness of eyelid closure.  Facial sensation is normal with normal bite.  No facial asymmetry.  Hearing grossly intact b/l.  Palate elevates midline.  Tongue midline.  Motor examination:   Normal tone, bulk and range of motion.  MIld action bilateral hand tremors.  Formal Muscle Strength Testin/5 UE; 5/5 LE.  No observable drift.  Reflexes:   2+ b/l   Sensory examination:   Intact to light touch and pinprick, pain,.  Cerebellum:  No dysmetria or dysdiadokinesia.  Gait is steady.        Labs:   2022 COVID-19 PCR negative          Neuroimaging:  CT Head:   < from: CT Head No Cont (19 @ 20:05) >  FINDINGS:    The cortical sulci and ventricular system are appropriate for patient's   stated age.     No acute intracranial hemorrhage. No mass effect, midline shift, or   intracranial fluid collection.     Gray-white differentiation is maintained.    The calvarium is intact. There is no soft tissue swelling.     The visualized paranasal sinuses and mastoid air cells are well aerated.    IMPRESSION:  No evidence of acute intracranial pathology.    < end of copied text >          REEG 4/15/2022 - borderline to mild right temporal slowing      VEEG  - 2019 -   < from: EEG w/ Video Monitoring Each 24 hours (19 @ 10:00) >  GENERALIZED SLOWING  Borderline to mild generalized slowing.     FOCAL SLOWING  Mild right hemispheric focal slowing.       BREACH ARTIFACT  No breach artifact      Activation Procedure  Hyper Ventilation/Photic Stimulation  Was not performed.       Sleep Deprivation/Medication Reduction  Was not performed      EPILEPTIFORM ACTIVITY  Small number of right frontotemporal sharps and sharp transients      FREQUENCY  As above.       AREA OF ACTIVITY  As above.       EVENTS/SEIZURES  No events reported or recorded. In one occasion he appeared agitated and pulled electrodes off there was no clinical or subclinical seizures prior to that      IMPRESSIONS  Abnormal video EEG monitoring due to the presence of focal and generalized slowing as described above and also abnormal interictal activity as described above      CLINICAL CORRELATION  Consistent with diffuse and also right hemispheric focal electrophysiological dysfunction secondary to structural and toxic metabolic causes. The recording also shows evidence for potential for partial seizure from the right hemisphere..      < end of copied text >          Assessment:  This is a 67y Male with h/o DLD, left leg DVT, and seizure disorder diagnosed at 19 yo. He is reported to be seizure-free for over 6 months on Dilantin and Depakote. He was previously taking Phenobarbital as well, but pre family it was discontinued by PMD at some point in .        Discussed with Dr. Casas        Plan:   - VEEG monitoring for better characterization and assessment  - Seizure precautions  - COntinue home doses of seizure medications for now  - Ativan 2mg IV PRN for generalized tonic-clonic seizure lasting longer than 2 minutes, or two consecutive seizures without return to baseline in-between (ordered)  - CBC, CMP, Mg, AED levels trough, vitamin D  - Keep Mg above 2    Plan discussed with patient and family in details, all questions answered.    Discussed with nursing team and PA.

## 2022-06-01 LAB
ALBUMIN SERPL ELPH-MCNC: 3.8 G/DL — SIGNIFICANT CHANGE UP (ref 3.5–5.2)
ALP SERPL-CCNC: 57 U/L — SIGNIFICANT CHANGE UP (ref 30–115)
ALT FLD-CCNC: 18 U/L — SIGNIFICANT CHANGE UP (ref 0–41)
ANION GAP SERPL CALC-SCNC: 9 MMOL/L — SIGNIFICANT CHANGE UP (ref 7–14)
AST SERPL-CCNC: 19 U/L — SIGNIFICANT CHANGE UP (ref 0–41)
BASOPHILS # BLD AUTO: 0.05 K/UL — SIGNIFICANT CHANGE UP (ref 0–0.2)
BASOPHILS NFR BLD AUTO: 0.9 % — SIGNIFICANT CHANGE UP (ref 0–1)
BILIRUB SERPL-MCNC: <0.2 MG/DL — SIGNIFICANT CHANGE UP (ref 0.2–1.2)
BUN SERPL-MCNC: 14 MG/DL — SIGNIFICANT CHANGE UP (ref 10–20)
CALCIUM SERPL-MCNC: 8.8 MG/DL — SIGNIFICANT CHANGE UP (ref 8.5–10.1)
CHLORIDE SERPL-SCNC: 106 MMOL/L — SIGNIFICANT CHANGE UP (ref 98–110)
CO2 SERPL-SCNC: 26 MMOL/L — SIGNIFICANT CHANGE UP (ref 17–32)
CREAT SERPL-MCNC: 1.1 MG/DL — SIGNIFICANT CHANGE UP (ref 0.7–1.5)
EGFR: 74 ML/MIN/1.73M2 — SIGNIFICANT CHANGE UP
EOSINOPHIL # BLD AUTO: 0.4 K/UL — SIGNIFICANT CHANGE UP (ref 0–0.7)
EOSINOPHIL NFR BLD AUTO: 7 % — SIGNIFICANT CHANGE UP (ref 0–8)
GLUCOSE SERPL-MCNC: 100 MG/DL — HIGH (ref 70–99)
HCT VFR BLD CALC: 38.8 % — LOW (ref 42–52)
HGB BLD-MCNC: 12.6 G/DL — LOW (ref 14–18)
IMM GRANULOCYTES NFR BLD AUTO: 0.5 % — HIGH (ref 0.1–0.3)
LYMPHOCYTES # BLD AUTO: 2.02 K/UL — SIGNIFICANT CHANGE UP (ref 1.2–3.4)
LYMPHOCYTES # BLD AUTO: 35.5 % — SIGNIFICANT CHANGE UP (ref 20.5–51.1)
MCHC RBC-ENTMCNC: 30 PG — SIGNIFICANT CHANGE UP (ref 27–31)
MCHC RBC-ENTMCNC: 32.5 G/DL — SIGNIFICANT CHANGE UP (ref 32–37)
MCV RBC AUTO: 92.4 FL — SIGNIFICANT CHANGE UP (ref 80–94)
MONOCYTES # BLD AUTO: 0.45 K/UL — SIGNIFICANT CHANGE UP (ref 0.1–0.6)
MONOCYTES NFR BLD AUTO: 7.9 % — SIGNIFICANT CHANGE UP (ref 1.7–9.3)
NEUTROPHILS # BLD AUTO: 2.74 K/UL — SIGNIFICANT CHANGE UP (ref 1.4–6.5)
NEUTROPHILS NFR BLD AUTO: 48.2 % — SIGNIFICANT CHANGE UP (ref 42.2–75.2)
NRBC # BLD: 0 /100 WBCS — SIGNIFICANT CHANGE UP (ref 0–0)
PLATELET # BLD AUTO: 170 K/UL — SIGNIFICANT CHANGE UP (ref 130–400)
POTASSIUM SERPL-MCNC: 5.2 MMOL/L — HIGH (ref 3.5–5)
POTASSIUM SERPL-SCNC: 5.2 MMOL/L — HIGH (ref 3.5–5)
PROT SERPL-MCNC: 5.7 G/DL — LOW (ref 6–8)
RBC # BLD: 4.2 M/UL — LOW (ref 4.7–6.1)
RBC # FLD: 14.2 % — SIGNIFICANT CHANGE UP (ref 11.5–14.5)
SODIUM SERPL-SCNC: 141 MMOL/L — SIGNIFICANT CHANGE UP (ref 135–146)
WBC # BLD: 5.69 K/UL — SIGNIFICANT CHANGE UP (ref 4.8–10.8)
WBC # FLD AUTO: 5.69 K/UL — SIGNIFICANT CHANGE UP (ref 4.8–10.8)

## 2022-06-01 PROCEDURE — 95720 EEG PHY/QHP EA INCR W/VEEG: CPT

## 2022-06-01 PROCEDURE — 99231 SBSQ HOSP IP/OBS SF/LOW 25: CPT

## 2022-06-01 RX ORDER — CHOLECALCIFEROL (VITAMIN D3) 125 MCG
2000 CAPSULE ORAL DAILY
Refills: 0 | Status: DISCONTINUED | OUTPATIENT
Start: 2022-06-01 | End: 2022-06-03

## 2022-06-01 RX ADMIN — DIVALPROEX SODIUM 750 MILLIGRAM(S): 500 TABLET, DELAYED RELEASE ORAL at 10:19

## 2022-06-01 RX ADMIN — CHLORHEXIDINE GLUCONATE 1 APPLICATION(S): 213 SOLUTION TOPICAL at 05:53

## 2022-06-01 RX ADMIN — APIXABAN 5 MILLIGRAM(S): 2.5 TABLET, FILM COATED ORAL at 05:56

## 2022-06-01 RX ADMIN — Medication 100 MILLIGRAM(S): at 10:18

## 2022-06-01 RX ADMIN — Medication 50 MILLIGRAM(S): at 21:30

## 2022-06-01 RX ADMIN — DIVALPROEX SODIUM 750 MILLIGRAM(S): 500 TABLET, DELAYED RELEASE ORAL at 21:31

## 2022-06-01 RX ADMIN — APIXABAN 5 MILLIGRAM(S): 2.5 TABLET, FILM COATED ORAL at 17:45

## 2022-06-01 RX ADMIN — ATORVASTATIN CALCIUM 20 MILLIGRAM(S): 80 TABLET, FILM COATED ORAL at 21:31

## 2022-06-01 RX ADMIN — Medication 100 MILLIGRAM(S): at 21:31

## 2022-06-01 RX ADMIN — Medication 2000 UNIT(S): at 11:51

## 2022-06-02 LAB
PHENYTOIN FREE SERPL-MCNC: 16.5 UG/ML — SIGNIFICANT CHANGE UP (ref 10–20)
VALPROATE SERPL-MCNC: 28 UG/ML — LOW (ref 50–100)

## 2022-06-02 PROCEDURE — 95720 EEG PHY/QHP EA INCR W/VEEG: CPT

## 2022-06-02 PROCEDURE — 99231 SBSQ HOSP IP/OBS SF/LOW 25: CPT

## 2022-06-02 RX ADMIN — ATORVASTATIN CALCIUM 20 MILLIGRAM(S): 80 TABLET, FILM COATED ORAL at 21:27

## 2022-06-02 RX ADMIN — APIXABAN 5 MILLIGRAM(S): 2.5 TABLET, FILM COATED ORAL at 17:45

## 2022-06-02 RX ADMIN — Medication 2000 UNIT(S): at 12:03

## 2022-06-02 RX ADMIN — APIXABAN 5 MILLIGRAM(S): 2.5 TABLET, FILM COATED ORAL at 05:27

## 2022-06-02 RX ADMIN — DIVALPROEX SODIUM 750 MILLIGRAM(S): 500 TABLET, DELAYED RELEASE ORAL at 10:23

## 2022-06-02 RX ADMIN — Medication 100 MILLIGRAM(S): at 10:22

## 2022-06-02 RX ADMIN — DIVALPROEX SODIUM 750 MILLIGRAM(S): 500 TABLET, DELAYED RELEASE ORAL at 21:27

## 2022-06-02 NOTE — PROGRESS NOTE ADULT - ASSESSMENT
68 y/o male with Seizure disorder since 19 yo admitted for Neurologic evaluation and review of present anti- seizure meds.     Problem/Plan - 1:  ·  Problem: Seizure disorder.   Neurologic management by Dr Casas appreciated  Continue VEEG monitoring  Seizure precautions  Continue current dose of Depakote  Decrease Dilantin by 50%     Problem/Plan - 2:  ·  Problem: DVT, lower extremity.   ·  Plan: continue Eliiquis.  
  66 y/o male with Seizure disorder since 21 yo admitted for Neurologic evaluation and review of present anti- seizure meds.     Problem/Plan - 1:  ·  Problem: Seizure disorder.   Neurologic management by Dr Casas appreciated  Continue VEEG monitoring  Seizure precautions  Continue current dose of Depakote  Decrease Dilantin by 50%     Problem/Plan - 2:  ·  Problem: DVT, lower extremity.   ·  Plan: continue Eliiquis.

## 2022-06-03 ENCOUNTER — TRANSCRIPTION ENCOUNTER (OUTPATIENT)
Age: 67
End: 2022-06-03

## 2022-06-03 VITALS
HEART RATE: 63 BPM | TEMPERATURE: 97 F | SYSTOLIC BLOOD PRESSURE: 91 MMHG | RESPIRATION RATE: 18 BRPM | DIASTOLIC BLOOD PRESSURE: 57 MMHG

## 2022-06-03 PROBLEM — I82.409 ACUTE EMBOLISM AND THROMBOSIS OF UNSPECIFIED DEEP VEINS OF UNSPECIFIED LOWER EXTREMITY: Chronic | Status: ACTIVE | Noted: 2022-05-31

## 2022-06-03 LAB
ALBUMIN SERPL ELPH-MCNC: 4.1 G/DL — SIGNIFICANT CHANGE UP (ref 3.5–5.2)
ALP SERPL-CCNC: 67 U/L — SIGNIFICANT CHANGE UP (ref 30–115)
ALT FLD-CCNC: 21 U/L — SIGNIFICANT CHANGE UP (ref 0–41)
ANION GAP SERPL CALC-SCNC: 11 MMOL/L — SIGNIFICANT CHANGE UP (ref 7–14)
AST SERPL-CCNC: 19 U/L — SIGNIFICANT CHANGE UP (ref 0–41)
BASOPHILS # BLD AUTO: 0.05 K/UL — SIGNIFICANT CHANGE UP (ref 0–0.2)
BASOPHILS NFR BLD AUTO: 0.7 % — SIGNIFICANT CHANGE UP (ref 0–1)
BILIRUB SERPL-MCNC: 0.2 MG/DL — SIGNIFICANT CHANGE UP (ref 0.2–1.2)
BUN SERPL-MCNC: 22 MG/DL — HIGH (ref 10–20)
CALCIUM SERPL-MCNC: 8.8 MG/DL — SIGNIFICANT CHANGE UP (ref 8.5–10.1)
CHLORIDE SERPL-SCNC: 104 MMOL/L — SIGNIFICANT CHANGE UP (ref 98–110)
CO2 SERPL-SCNC: 25 MMOL/L — SIGNIFICANT CHANGE UP (ref 17–32)
CREAT SERPL-MCNC: 1.1 MG/DL — SIGNIFICANT CHANGE UP (ref 0.7–1.5)
EGFR: 74 ML/MIN/1.73M2 — SIGNIFICANT CHANGE UP
EOSINOPHIL # BLD AUTO: 0.51 K/UL — SIGNIFICANT CHANGE UP (ref 0–0.7)
EOSINOPHIL NFR BLD AUTO: 6.7 % — SIGNIFICANT CHANGE UP (ref 0–8)
GLUCOSE SERPL-MCNC: 134 MG/DL — HIGH (ref 70–99)
HCT VFR BLD CALC: 42.7 % — SIGNIFICANT CHANGE UP (ref 42–52)
HGB BLD-MCNC: 14 G/DL — SIGNIFICANT CHANGE UP (ref 14–18)
IMM GRANULOCYTES NFR BLD AUTO: 0.4 % — HIGH (ref 0.1–0.3)
LYMPHOCYTES # BLD AUTO: 2.12 K/UL — SIGNIFICANT CHANGE UP (ref 1.2–3.4)
LYMPHOCYTES # BLD AUTO: 27.7 % — SIGNIFICANT CHANGE UP (ref 20.5–51.1)
MAGNESIUM SERPL-MCNC: 2.1 MG/DL — SIGNIFICANT CHANGE UP (ref 1.8–2.4)
MCHC RBC-ENTMCNC: 29.9 PG — SIGNIFICANT CHANGE UP (ref 27–31)
MCHC RBC-ENTMCNC: 32.8 G/DL — SIGNIFICANT CHANGE UP (ref 32–37)
MCV RBC AUTO: 91 FL — SIGNIFICANT CHANGE UP (ref 80–94)
MONOCYTES # BLD AUTO: 0.5 K/UL — SIGNIFICANT CHANGE UP (ref 0.1–0.6)
MONOCYTES NFR BLD AUTO: 6.5 % — SIGNIFICANT CHANGE UP (ref 1.7–9.3)
NEUTROPHILS # BLD AUTO: 4.44 K/UL — SIGNIFICANT CHANGE UP (ref 1.4–6.5)
NEUTROPHILS NFR BLD AUTO: 58 % — SIGNIFICANT CHANGE UP (ref 42.2–75.2)
NRBC # BLD: 0 /100 WBCS — SIGNIFICANT CHANGE UP (ref 0–0)
PHENYTOIN FREE SERPL-MCNC: 11.8 UG/ML — SIGNIFICANT CHANGE UP (ref 10–20)
PLATELET # BLD AUTO: 193 K/UL — SIGNIFICANT CHANGE UP (ref 130–400)
POTASSIUM SERPL-MCNC: 4.8 MMOL/L — SIGNIFICANT CHANGE UP (ref 3.5–5)
POTASSIUM SERPL-SCNC: 4.8 MMOL/L — SIGNIFICANT CHANGE UP (ref 3.5–5)
PROT SERPL-MCNC: 6.3 G/DL — SIGNIFICANT CHANGE UP (ref 6–8)
RBC # BLD: 4.69 M/UL — LOW (ref 4.7–6.1)
RBC # FLD: 13.6 % — SIGNIFICANT CHANGE UP (ref 11.5–14.5)
SODIUM SERPL-SCNC: 140 MMOL/L — SIGNIFICANT CHANGE UP (ref 135–146)
VALPROATE SERPL-MCNC: 28 UG/ML — LOW (ref 50–100)
WBC # BLD: 7.65 K/UL — SIGNIFICANT CHANGE UP (ref 4.8–10.8)
WBC # FLD AUTO: 7.65 K/UL — SIGNIFICANT CHANGE UP (ref 4.8–10.8)

## 2022-06-03 PROCEDURE — 95720 EEG PHY/QHP EA INCR W/VEEG: CPT

## 2022-06-03 PROCEDURE — 99231 SBSQ HOSP IP/OBS SF/LOW 25: CPT

## 2022-06-03 PROCEDURE — 99238 HOSP IP/OBS DSCHRG MGMT 30/<: CPT

## 2022-06-03 RX ORDER — DIVALPROEX SODIUM 500 MG/1
1 TABLET, DELAYED RELEASE ORAL
Qty: 210 | Refills: 5
Start: 2022-06-03 | End: 2022-11-29

## 2022-06-03 RX ORDER — DIVALPROEX SODIUM 500 MG/1
2 TABLET, DELAYED RELEASE ORAL
Qty: 0 | Refills: 0 | DISCHARGE
Start: 2022-06-03

## 2022-06-03 RX ORDER — DIVALPROEX SODIUM 500 MG/1
1000 TABLET, DELAYED RELEASE ORAL AT BEDTIME
Refills: 0 | Status: DISCONTINUED | OUTPATIENT
Start: 2022-06-03 | End: 2022-06-03

## 2022-06-03 RX ORDER — DIVALPROEX SODIUM 500 MG/1
4 TABLET, DELAYED RELEASE ORAL
Refills: 0 | DISCHARGE
Start: 2022-06-03

## 2022-06-03 RX ORDER — DIVALPROEX SODIUM 500 MG/1
750 TABLET, DELAYED RELEASE ORAL
Refills: 0 | Status: DISCONTINUED | OUTPATIENT
Start: 2022-06-04 | End: 2022-06-03

## 2022-06-03 RX ADMIN — CHLORHEXIDINE GLUCONATE 1 APPLICATION(S): 213 SOLUTION TOPICAL at 06:54

## 2022-06-03 RX ADMIN — Medication 2000 UNIT(S): at 11:50

## 2022-06-03 RX ADMIN — DIVALPROEX SODIUM 750 MILLIGRAM(S): 500 TABLET, DELAYED RELEASE ORAL at 10:31

## 2022-06-03 RX ADMIN — APIXABAN 5 MILLIGRAM(S): 2.5 TABLET, FILM COATED ORAL at 06:53

## 2022-06-03 RX ADMIN — Medication 200 MILLIGRAM(S): at 11:49

## 2022-06-03 NOTE — PROGRESS NOTE ADULT - REASON FOR ADMISSION
Evaluation of present seizure treatment
It appears that the cyclobenzaprine was somehow discontinued while patient was at Dr. Bhakta's office. I called patient and she is unaware that Dr. Bhakta even addressed this medication. She would like to get a refill, are you okay with reordering this med  Last fill 3/30/10 #90 with 1 refill  LOV today  NOV 7/5/19  [facility]  
Evaluation of present seizure treatment

## 2022-06-03 NOTE — DISCHARGE NOTE NURSING/CASE MANAGEMENT/SOCIAL WORK - PATIENT PORTAL LINK FT
You can access the FollowMyHealth Patient Portal offered by [facility] by registering at the following website: [email or web address]. By joining [email or web address]’s FollowMyHealth portal, you will also be able to view your health information using other applications (apps) compatible with our system.

## 2022-06-03 NOTE — DISCHARGE NOTE PROVIDER - CARE PROVIDER_API CALL
Marvin Casas)  Neurology  [address] [place] [address], [address] [place] [address]  Phone: [phone]  Fax: [phone]  Scheduled Appointment: 08/01/2022 03:00 PM

## 2022-06-03 NOTE — DISCHARGE NOTE PROVIDER - CARE PROVIDERS DIRECT ADDRESSES
[email or web address]

## 2022-06-03 NOTE — DISCHARGE NOTE PROVIDER - NSDCMRMEDTOKEN_GEN_ALL_CORE_FT
Depakote 250 mg oral delayed release tablet: 3 tab(s) orally once a day  Depakote 500 mg oral delayed release tablet: 2 tab(s) orally once a day (at bedtime)  Dilantin 100 mg oral capsule, extended release: 1 cap(s) orally once a day  Eliquis 5 mg oral tablet: 1 tab(s) orally 2 times a day   Lipitor 20 mg oral tablet: 1 tab(s) orally once a day  phenytoin 300 mg oral capsule, extended release: 1 cap(s) orally once a day (at bedtime)

## 2022-06-03 NOTE — DISCHARGE NOTE PROVIDER - HOSPITAL COURSE
VEEG monitoring completed, patient is cleared for discharge from neurology standpoint.    Follow up neurology appointment is scheduled with Dr. Casas   for August 1, 2022 at 3 pm.    [address] [place] [address], [address]  [phone]    Discharge seizure medications are:  Dilantin 100mg in AM & 300mg in HS (decreased)  Depakote 750mg in AM & 1000mg in HS (increased)    Rx sent to the pharmacy.    Patient was also given Rx for CBC, CMP, AED levels trough to be done prior to follow up.    MRI brain with seizure protocol to be done prior to follow up as previously planned (patient already has Rx).    Detailed instructions regarding seizure precautions and follow up plan are given to the patient and sister Radha [phone], all questions answered. Patient and sister verbalized understanding.

## 2022-06-03 NOTE — PROGRESS NOTE ADULT - SUBJECTIVE AND OBJECTIVE BOX
66 y/o male with Seizure disorder since 21 yo admitted for Neurologic evaluation and review of present anti- seizure meds.    Today:  Seen at bedside, no new complaints.          REVIEW OF SYSTEMS:  No new complaints.      MEDICATIONS  (STANDING):  apixaban 5 milliGRAM(s) Oral every 12 hours  atorvastatin 20 milliGRAM(s) Oral at bedtime  chlorhexidine 4% Liquid 1 Application(s) Topical <User Schedule>  cholecalciferol 2000 Unit(s) Oral daily  diVALproex  milliGRAM(s) Oral every 12 hours    MEDICATIONS  (PRN):  LORazepam   Injectable 2 milliGRAM(s) IV Push three times a day PRN generalized tonic-clonic seizure lasting longer than 2 minutes      Allergies  No Known Allergies        Vital Signs Last 24 Hrs  T(C): 36.1 (02 Jun 2022 05:38), Max: 36.2 (01 Jun 2022 21:49)  T(F): 97 (02 Jun 2022 05:38), Max: 97.1 (01 Jun 2022 21:49)  HR: 59 (02 Jun 2022 05:38) (53 - 61)  BP: 91/53 (02 Jun 2022 05:38) (83/55 - 113/63)  RR: 16 (02 Jun 2022 05:38) (16 - 16)      PHYSICAL EXAM:  GENERAL: NAD, well-groomed, well-developed  HEAD:  Atraumatic, Normocephalic  EYES: EOMI, PERRLA, conjunctiva and sclera clear  ENMT: No tonsillar erythema, exudates, or enlargement; Moist mucous membranes, Good dentition, No lesions  NECK: Supple, No JVD, Normal thyroid  NERVOUS SYSTEM:  Alert & Oriented X3  CHEST/LUNG: CTA bilaterally; No rales, rhonchi, wheezing, or rubs  HEART: Regular rate and rhythm; No murmurs, rubs, or gallops  ABDOMEN: Soft, Nontender, Nondistended; Bowel sounds present  EXTREMITIES:  2+ Peripheral Pulses, No clubbing, cyanosis, or edema  SKIN: No rashes or lesions      LABS:                        12.6   5.69  )-----------( 170      ( 01 Jun 2022 08:00 )             38.8     06-01    141  |  106  |  14  ----------------------------<  100<H>  5.2<H>   |  26  |  1.1    Ca    8.8      01 Jun 2022 08:00  Mg     2.1     05-31    TPro  5.7<L>  /  Alb  3.8  /  TBili  <0.2  /  DBili  x   /  AST  19  /  ALT  18  /  AlkPhos  57  06-01      
    68 y/o male with Seizure disorder since 21 yo admitted for Neurologic evaluation and review of present anti- seizure meds.    Today:  No overnight events.          REVIEW OF SYSTEMS:  No new complaints      MEDICATIONS  (STANDING):  apixaban 5 milliGRAM(s) Oral every 12 hours  atorvastatin 20 milliGRAM(s) Oral at bedtime  chlorhexidine 4% Liquid 1 Application(s) Topical <User Schedule>  cholecalciferol 2000 Unit(s) Oral daily  diVALproex  milliGRAM(s) Oral every 12 hours  phenytoin   Capsule 100 milliGRAM(s) Oral every 12 hours  phenytoin   Chewable 50 milliGRAM(s) Chew at bedtime    MEDICATIONS  (PRN):  LORazepam   Injectable 2 milliGRAM(s) IV Push three times a day PRN generalized tonic-clonic seizure lasting longer than 2 minutes      Allergies  No Known Allergies          Vital Signs Last 24 Hrs  T(C): 35.3 (01 Jun 2022 14:56), Max: 36.6 (31 May 2022 21:20)  T(F): 95.6 (01 Jun 2022 14:56), Max: 97.8 (31 May 2022 21:20)  HR: 61 (01 Jun 2022 15:19) (60 - 65)  BP: 96/64 (01 Jun 2022 15:19) (83/55 - 96/64)  RR: 16 (01 Jun 2022 14:56) (16 - 18)      PHYSICAL EXAM:  GENERAL: NAD, well-groomed, well-developed  HEAD:  Atraumatic, Normocephalic  EYES: EOMI, PERRLA, conjunctiva and sclera clear  ENMT: No tonsillar erythema, exudates, or enlargement; Moist mucous membranes, Good dentition, No lesions  NECK: Supple, No JVD, Normal thyroid  NERVOUS SYSTEM:  Alert & Oriented X3  CHEST/LUNG: CTA bilaterally; No rales, rhonchi, wheezing, or rubs  HEART: Regular rate and rhythm; No murmurs, rubs, or gallops  ABDOMEN: Soft, Nontender, Nondistended; Bowel sounds present  EXTREMITIES:  2+ Peripheral Pulses, No clubbing, cyanosis, or edema  SKIN: No rashes or lesions      LABS:                        12.6   5.69  )-----------( 170      ( 01 Jun 2022 08:00 )             38.8     06-01    141  |  106  |  14  ----------------------------<  100<H>  5.2<H>   |  26  |  1.1    Ca    8.8      01 Jun 2022 08:00  Mg     2.1     05-31    TPro  5.7<L>  /  Alb  3.8  /  TBili  <0.2  /  DBili  x   /  AST  19  /  ALT  18  /  AlkPhos  57  06-01      
Epilepsy Attending Note:     BEAU WONG    67y Male  MRN [id number]    Vital Signs Last 24 Hrs  T(C): 35.9 (03 Jun 2022 05:11), Max: 36.8 (02 Jun 2022 21:09)  T(F): 96.7 (03 Jun 2022 05:11), Max: 98.3 (02 Jun 2022 21:09)  HR: 64 (03 Jun 2022 05:11) (62 - 64)  BP: 87/50 (03 Jun 2022 05:11) (84/52 - 88/55)  BP(mean): --  RR: 18 (03 Jun 2022 05:11) (16 - 18)  SpO2: --                          14.0   7.65  )-----------( 193      ( 03 Jun 2022 09:32 )             42.7       06-03    140  |  104  |  22<H>  ----------------------------<  134<H>  4.8   |  25  |  1.1    Ca    8.8      03 Jun 2022 09:32  Mg     2.1     06-03    TPro  6.3  /  Alb  4.1  /  TBili  0.2  /  DBili  x   /  AST  19  /  ALT  21  /  AlkPhos  67  06-03      MEDICATIONS  (STANDING):  apixaban 5 milliGRAM(s) Oral every 12 hours  atorvastatin 20 milliGRAM(s) Oral at bedtime  chlorhexidine 4% Liquid 1 Application(s) Topical <User Schedule>  cholecalciferol 2000 Unit(s) Oral daily  diVALproex  milliGRAM(s) Oral every 12 hours    MEDICATIONS  (PRN):  LORazepam   Injectable 2 milliGRAM(s) IV Push three times a day PRN generalized tonic-clonic seizure lasting longer than 2 minutes      Valproic Acid Level, Serum: 28.0 ug/mL [50.0 - 100.0] (06-03-22 @ 09:32)  Phenytoin Level, Serum: 11.8 ug/mL [10.0 - 20.0] (06-03-22 @ 09:32)  Phenytoin Level, Serum: 16.5 ug/mL [10.0 - 20.0] (06-02-22 @ 08:56)  Valproic Acid Level, Serum: 28.0 ug/mL [50.0 - 100.0] (06-02-22 @ 08:56)  Phenytoin Level, Serum: 14.3 ug/mL [10.0 - 20.0] (05-31-22 @ 18:46)  Valproic Acid Level, Serum: 23.0 ug/mL [50.0 - 100.0] (05-31-22 @ 18:46)        VEEG in the last 24 hours:    Background - continuous, symmetrical, well organized, reaching frequencies in the range of 8-9 Hz    Focal and generalized slowing - none    Interictal activity - none    Events - none    Seizures - none    Impression: H/o seizure disorder, Normal VEEG x 72 hrs.     Plan -   Will d/c VEEG  Give Dilantin 200mg x 1 now  Discharge on Depakote 750mg AM & 1000mg at night, Dilantin 100mg am and 300mg at night  MRI brain with seizure protocol as outpatient  Follow up as scheduled with levels  
Epilepsy Attending Note:     BEAU WONG    67y Male  MRN [id number]    Vital Signs Last 24 Hrs  T(C): 35.9 (01 Jun 2022 05:14), Max: 36.6 (31 May 2022 21:20)  T(F): 96.6 (01 Jun 2022 05:14), Max: 97.8 (31 May 2022 21:20)  HR: 61 (01 Jun 2022 05:14) (61 - 74)  BP: 92/51 (01 Jun 2022 05:14) (91/50 - 105/68)  BP(mean): --  RR: 16 (01 Jun 2022 05:14) (16 - 18)  SpO2: --                          12.6   5.69  )-----------( 170      ( 01 Jun 2022 08:00 )             38.8       06-01    141  |  106  |  14  ----------------------------<  100<H>  5.2<H>   |  26  |  1.1    Ca    8.8      01 Jun 2022 08:00  Mg     2.1     05-31    TPro  5.7<L>  /  Alb  3.8  /  TBili  <0.2  /  DBili  x   /  AST  19  /  ALT  18  /  AlkPhos  57  06-01      MEDICATIONS  (STANDING):  apixaban 5 milliGRAM(s) Oral every 12 hours  atorvastatin 20 milliGRAM(s) Oral at bedtime  chlorhexidine 4% Liquid 1 Application(s) Topical <User Schedule>  cholecalciferol 2000 Unit(s) Oral daily  diVALproex  milliGRAM(s) Oral every 12 hours  phenytoin   Capsule 100 milliGRAM(s) Oral every 12 hours  phenytoin   Chewable 50 milliGRAM(s) Chew at bedtime    MEDICATIONS  (PRN):  LORazepam   Injectable 2 milliGRAM(s) IV Push three times a day PRN generalized tonic-clonic seizure lasting longer than 2 minutes      Phenytoin Level, Serum: 14.3 ug/mL [10.0 - 20.0] (05-31-22 @ 18:46)  Valproic Acid Level, Serum: 23.0 ug/mL [50.0 - 100.0] (05-31-22 @ 18:46)        VEEG in the last 24 hours:    Background - continuous, symmetrical, well organized, reaching frequencies in the range of 8-9 Hz    Focal and generalized slowing - none    Interictal activity - none    Events - none    Seizures - none    Impression: Normal VEEG x 24 hrs    Plan -   Continue VEEG monitoring  Seizure precautions  Continue current dose of Depakote  Decrease Dilantin by 50%  
Epilepsy Attending Note:     BEAU WONG    67y Male  MRN [id number]    Vital Signs Last 24 Hrs  T(C): 36.1 (02 Jun 2022 05:38), Max: 36.2 (01 Jun 2022 21:49)  T(F): 97 (02 Jun 2022 05:38), Max: 97.1 (01 Jun 2022 21:49)  HR: 59 (02 Jun 2022 05:38) (53 - 61)  BP: 91/53 (02 Jun 2022 05:38) (83/55 - 113/63)  BP(mean): --  RR: 16 (02 Jun 2022 05:38) (16 - 16)  SpO2: --                          12.6   5.69  )-----------( 170      ( 01 Jun 2022 08:00 )             38.8       06-01    141  |  106  |  14  ----------------------------<  100<H>  5.2<H>   |  26  |  1.1    Ca    8.8      01 Jun 2022 08:00  Mg     2.1     05-31    TPro  5.7<L>  /  Alb  3.8  /  TBili  <0.2  /  DBili  x   /  AST  19  /  ALT  18  /  AlkPhos  57  06-01      MEDICATIONS  (STANDING):  apixaban 5 milliGRAM(s) Oral every 12 hours  atorvastatin 20 milliGRAM(s) Oral at bedtime  chlorhexidine 4% Liquid 1 Application(s) Topical <User Schedule>  cholecalciferol 2000 Unit(s) Oral daily  diVALproex  milliGRAM(s) Oral every 12 hours    MEDICATIONS  (PRN):  LORazepam   Injectable 2 milliGRAM(s) IV Push three times a day PRN generalized tonic-clonic seizure lasting longer than 2 minutes      Phenytoin Level, Serum: 16.5 ug/mL [10.0 - 20.0] (06-02-22 @ 08:56)  Valproic Acid Level, Serum: 28.0 ug/mL [50.0 - 100.0] (06-02-22 @ 08:56)  Phenytoin Level, Serum: 14.3 ug/mL [10.0 - 20.0] (05-31-22 @ 18:46)  Valproic Acid Level, Serum: 23.0 ug/mL [50.0 - 100.0] (05-31-22 @ 18:46)        VEEG in the last 24 hours:    Background - continuous, symmetrical, well organized, reaching frequencies in the range of 8-9 Hz    Focal and generalized slowing - none    Interictal activity - none    Events - none    Seizures - none    Impression: Normal VEEG x 48 hrs on lower dose of Dilantin.    Plan -   Continue VEEG  Continue decreasing Dilantin  Continue Depakote at current dose  Seizure precautions  Check levels trough in AM  
Epilepsy Team Discharge Note:    VEEG monitoring completed, patient is cleared for discharge from neurology standpoint.    Follow up neurology appointment is scheduled with Dr. Casas   for August 1, 2022 at 3 pm.    [address] [place] [address], [address]  [phone]    Discharge seizure medications are:  Dilantin 100mg in AM & 300mg in HS (decreased)  Depakote 750mg in AM & 1000mg in HS (increased)    Rx sent to the pharmacy.    Patient was also given Rx for CBC, CMP, AED levels trough to be done prior to follow up.    MRI brain with seizure protocol to be done prior to follow up as previously planned (patient already has Rx).    Detailed instructions regarding seizure precautions and follow up plan are given to the patient and sister Radha [phone], all questions answered. Patient and sister verbalized understanding.    Discussed with medical and nursing teams.

## 2022-06-03 NOTE — DISCHARGE NOTE NURSING/CASE MANAGEMENT/SOCIAL WORK - NSDCPEFALRISK_GEN_ALL_CORE
For information on Fall & Injury Prevention, visit: [email or web address] OR  [email or web address] OR  https://www.cdc.gov/steadi/patient.html

## 2022-06-03 NOTE — DISCHARGE NOTE PROVIDER - NSDCFUSCHEDAPPT_GEN_ALL_CORE_FT
Maryuri Correa  [facility] Physician [facility]  HemOnc [address]  Scheduled Appointment: 06/24/2022    Marvin Casas  [facility] Physician [facility]  NEUROLOGY [address]  Scheduled Appointment: 08/01/2022

## 2022-06-09 DIAGNOSIS — Z86.718 PERSONAL HISTORY OF OTHER VENOUS THROMBOSIS AND EMBOLISM: ICD-10-CM

## 2022-06-09 DIAGNOSIS — R60.0 LOCALIZED EDEMA: ICD-10-CM

## 2022-06-09 DIAGNOSIS — F10.11 ALCOHOL ABUSE, IN REMISSION: ICD-10-CM

## 2022-06-09 DIAGNOSIS — Z79.01 LONG TERM (CURRENT) USE OF ANTICOAGULANTS: ICD-10-CM

## 2022-06-09 DIAGNOSIS — G40.909 EPILEPSY, UNSPECIFIED, NOT INTRACTABLE, WITHOUT STATUS EPILEPTICUS: ICD-10-CM

## 2022-06-09 DIAGNOSIS — Z79.899 OTHER LONG TERM (CURRENT) DRUG THERAPY: ICD-10-CM

## 2022-06-09 DIAGNOSIS — F17.210 NICOTINE DEPENDENCE, CIGARETTES, UNCOMPLICATED: ICD-10-CM

## 2022-06-09 DIAGNOSIS — E78.5 HYPERLIPIDEMIA, UNSPECIFIED: ICD-10-CM

## 2022-06-09 DIAGNOSIS — I10 ESSENTIAL (PRIMARY) HYPERTENSION: ICD-10-CM

## 2022-06-24 ENCOUNTER — APPOINTMENT (OUTPATIENT)
Dept: HEMATOLOGY ONCOLOGY | Facility: CLINIC | Age: 67
End: 2022-06-24

## 2022-08-01 ENCOUNTER — APPOINTMENT (OUTPATIENT)
Dept: NEUROLOGY | Facility: CLINIC | Age: 67
End: 2022-08-01

## 2022-08-01 RX ORDER — APIXABAN 5 MG/1
5 TABLET, FILM COATED ORAL
Qty: 60 | Refills: 6 | Status: ACTIVE | COMMUNITY
Start: 2022-08-01 | End: 1900-01-01

## 2022-08-16 ENCOUNTER — EMERGENCY (EMERGENCY)
Facility: HOSPITAL | Age: 67
LOS: 0 days | Discharge: HOME | End: 2022-08-16
Attending: EMERGENCY MEDICINE | Admitting: EMERGENCY MEDICINE

## 2022-08-16 VITALS
SYSTOLIC BLOOD PRESSURE: 100 MMHG | TEMPERATURE: 98 F | RESPIRATION RATE: 18 BRPM | HEART RATE: 81 BPM | OXYGEN SATURATION: 100 % | DIASTOLIC BLOOD PRESSURE: 86 MMHG

## 2022-08-16 VITALS — WEIGHT: 169.98 LBS | HEIGHT: 73 IN

## 2022-08-16 DIAGNOSIS — R56.9 UNSPECIFIED CONVULSIONS: ICD-10-CM

## 2022-08-16 DIAGNOSIS — Z79.01 LONG TERM (CURRENT) USE OF ANTICOAGULANTS: ICD-10-CM

## 2022-08-16 LAB
ALBUMIN SERPL ELPH-MCNC: 4.1 G/DL — SIGNIFICANT CHANGE UP (ref 3.5–5.2)
ALP SERPL-CCNC: 56 U/L — SIGNIFICANT CHANGE UP (ref 30–115)
ALT FLD-CCNC: 15 U/L — SIGNIFICANT CHANGE UP (ref 0–41)
ANION GAP SERPL CALC-SCNC: 11 MMOL/L — SIGNIFICANT CHANGE UP (ref 7–14)
AST SERPL-CCNC: 18 U/L — SIGNIFICANT CHANGE UP (ref 0–41)
BASOPHILS # BLD AUTO: 0.06 K/UL — SIGNIFICANT CHANGE UP (ref 0–0.2)
BASOPHILS NFR BLD AUTO: 0.9 % — SIGNIFICANT CHANGE UP (ref 0–1)
BILIRUB SERPL-MCNC: <0.2 MG/DL — SIGNIFICANT CHANGE UP (ref 0.2–1.2)
BUN SERPL-MCNC: 13 MG/DL — SIGNIFICANT CHANGE UP (ref 10–20)
CALCIUM SERPL-MCNC: 8.6 MG/DL — SIGNIFICANT CHANGE UP (ref 8.5–10.1)
CHLORIDE SERPL-SCNC: 105 MMOL/L — SIGNIFICANT CHANGE UP (ref 98–110)
CO2 SERPL-SCNC: 26 MMOL/L — SIGNIFICANT CHANGE UP (ref 17–32)
CREAT SERPL-MCNC: 1 MG/DL — SIGNIFICANT CHANGE UP (ref 0.7–1.5)
EGFR: 84 ML/MIN/1.73M2 — SIGNIFICANT CHANGE UP
EOSINOPHIL # BLD AUTO: 0.16 K/UL — SIGNIFICANT CHANGE UP (ref 0–0.7)
EOSINOPHIL NFR BLD AUTO: 2.4 % — SIGNIFICANT CHANGE UP (ref 0–8)
GLUCOSE SERPL-MCNC: 97 MG/DL — SIGNIFICANT CHANGE UP (ref 70–99)
HCT VFR BLD CALC: 37.3 % — LOW (ref 42–52)
HGB BLD-MCNC: 12.7 G/DL — LOW (ref 14–18)
IMM GRANULOCYTES NFR BLD AUTO: 0.5 % — HIGH (ref 0.1–0.3)
LYMPHOCYTES # BLD AUTO: 1.88 K/UL — SIGNIFICANT CHANGE UP (ref 1.2–3.4)
LYMPHOCYTES # BLD AUTO: 28.6 % — SIGNIFICANT CHANGE UP (ref 20.5–51.1)
MCHC RBC-ENTMCNC: 30.5 PG — SIGNIFICANT CHANGE UP (ref 27–31)
MCHC RBC-ENTMCNC: 34 G/DL — SIGNIFICANT CHANGE UP (ref 32–37)
MCV RBC AUTO: 89.4 FL — SIGNIFICANT CHANGE UP (ref 80–94)
MONOCYTES # BLD AUTO: 0.39 K/UL — SIGNIFICANT CHANGE UP (ref 0.1–0.6)
MONOCYTES NFR BLD AUTO: 5.9 % — SIGNIFICANT CHANGE UP (ref 1.7–9.3)
NEUTROPHILS # BLD AUTO: 4.05 K/UL — SIGNIFICANT CHANGE UP (ref 1.4–6.5)
NEUTROPHILS NFR BLD AUTO: 61.7 % — SIGNIFICANT CHANGE UP (ref 42.2–75.2)
NRBC # BLD: 0 /100 WBCS — SIGNIFICANT CHANGE UP (ref 0–0)
PHENYTOIN FREE SERPL-MCNC: 2.5 UG/ML — LOW (ref 10–20)
PLATELET # BLD AUTO: 172 K/UL — SIGNIFICANT CHANGE UP (ref 130–400)
POTASSIUM SERPL-MCNC: 4.9 MMOL/L — SIGNIFICANT CHANGE UP (ref 3.5–5)
POTASSIUM SERPL-SCNC: 4.9 MMOL/L — SIGNIFICANT CHANGE UP (ref 3.5–5)
PROT SERPL-MCNC: 6.3 G/DL — SIGNIFICANT CHANGE UP (ref 6–8)
RBC # BLD: 4.17 M/UL — LOW (ref 4.7–6.1)
RBC # FLD: 13.9 % — SIGNIFICANT CHANGE UP (ref 11.5–14.5)
SODIUM SERPL-SCNC: 142 MMOL/L — SIGNIFICANT CHANGE UP (ref 135–146)
VALPROATE SERPL-MCNC: 24 UG/ML — LOW (ref 50–100)
VALPROATE SERPL-MCNC: 25 UG/ML — LOW (ref 50–100)
WBC # BLD: 6.57 K/UL — SIGNIFICANT CHANGE UP (ref 4.8–10.8)
WBC # FLD AUTO: 6.57 K/UL — SIGNIFICANT CHANGE UP (ref 4.8–10.8)

## 2022-08-16 PROCEDURE — 99284 EMERGENCY DEPT VISIT MOD MDM: CPT | Mod: FS

## 2022-08-16 RX ORDER — DIVALPROEX SODIUM 500 MG/1
1000 TABLET, DELAYED RELEASE ORAL ONCE
Refills: 0 | Status: COMPLETED | OUTPATIENT
Start: 2022-08-16 | End: 2022-08-16

## 2022-08-16 RX ADMIN — Medication 300 MILLIGRAM(S): at 23:17

## 2022-08-16 RX ADMIN — DIVALPROEX SODIUM 1000 MILLIGRAM(S): 500 TABLET, DELAYED RELEASE ORAL at 23:18

## 2022-08-16 NOTE — ED PROVIDER NOTE - NS ED ROS FT
Review of Systems:  	•	CONSTITUTIONAL - no fever, no diaphoresis, no chills  	•	SKIN - no rash  	•	HEMATOLOGIC - no bleeding, no bruising  	•	EYES - no eye pain, no blurry vision  	•	ENT - no congestion  	•	RESPIRATORY - no shortness of breath, no cough  	•	CARDIAC - no chest pain, no palpitations  	•	GI - no abd pain, no nausea, no vomiting, no diarrhea, no constipation  	•	GENITO-URINARY - no dysuria; no hematuria, no increased urinary frequency  	•	MUSCULOSKELETAL - no joint paint, no swelling, no redness  	•	NEUROLOGIC - +seizures, no weakness, no headache, no paresthesias, no LOC  	•	PSYCH - no anxiety, no depression  	All other ROS are negative except as documented in HPI.

## 2022-08-16 NOTE — ED ADULT TRIAGE NOTE - CHIEF COMPLAINT QUOTE
pt biba for sz wit hx of sz. shaking for 2 minutes. was postictal on scene, no alert and cooperative.

## 2022-08-16 NOTE — ED PROVIDER NOTE - OBJECTIVE STATEMENT
66 yo M with pmhx of seizures presenting after evaluation of seizure activity. Patient was sitting on a bench in front of his house no head injury. Seizure was witnessed by family. Patient reports being compliant with meds. No cp, sob, fever, chills, abdominal pain, nausea, vomiting, diarrhea, back pain, urinary symptoms, headache, dizziness, paresthesias, or weakness.

## 2022-08-16 NOTE — ED PROVIDER NOTE - CARE PROVIDER_API CALL
Marvin Casas)  Neurology  [address] [place] [address], [address]  [place] [address]  Phone: [phone]  Fax: [phone]  Established Patient  Follow Up Time: Urgent

## 2022-08-16 NOTE — ED PROVIDER NOTE - CLINICAL SUMMARY MEDICAL DECISION MAKING FREE TEXT BOX
seizures, h/o epilepsy, witnessed by family who rpts no fall or HT (was seated on bench @ time of seizure) - avss, mildly post-ictal otherwise nf neuro exam, labs reviewed, seizure meds subthx, unclear compliance, oral dosing given, results d/w pt & pt's daughter over phone, strict return / seizure precauitons discussed, med compliance emphasized, rec close op Neuro f/u

## 2022-08-16 NOTE — ED PROVIDER NOTE - PATIENT PORTAL LINK FT
You can access the FollowMyHealth Patient Portal offered by [facility] by registering at the following website: [email or web address]. By joining [employer]’s FollowMyHealth portal, you will also be able to view your health information using other applications (apps) compatible with our system.

## 2022-08-16 NOTE — ED PROVIDER NOTE - ATTENDING APP SHARED VISIT CONTRIBUTION OF CARE
67M pmh seizure d/o on depakote & dilantin, dvt on elliquis, htn p/w seizure. Pt aaox3 but mildly post-ictal on exam, hpi obtained from daughter over phone who states that pt was sitting outside home on a bench (lives with nephew), witnessed to have GTC seizure ~2 min, no fall from bench, no ht. Family called 911. No other complaints. Pt endorses med compliance. Has no complaints. No ha, vision changes, cp/sob, nv, abd pain, focal numbness or weakness. States he has f/u appt with his Neurologist next month, cannot recall physician's name. EMR reviewed - recent hosp admission for seizures, d/c to home 6/3, meds were adjusted, had f/u appt for 8/1 which was [facility] in system, also had rx for MRI brain, unclear if completed / no imaging in PACS.    PE:  middle-aged m nad  skin warm, dry  ncat  op clear, tongue nml / no lacs, dentition intact, pharynx nml  neck supple no midline or paraspinal ttp  chest atx, non-tender  rrr nl s1s2 no mrg  ctab no wrr  abd soft ntnd no palpable masses no rgr  back non-tender no cvat  ext no cce dpi  neuro aaox3 although appears post-ictal, cn 2-12 intact bl no nystagmus or facial droop, 5/5 strength x 4 no drift, gross sensation intact, finger-nose nl, gait nl, grossly nf exam

## 2022-10-19 ENCOUNTER — APPOINTMENT (OUTPATIENT)
Dept: VASCULAR SURGERY | Facility: CLINIC | Age: 67
End: 2022-10-19

## 2022-10-19 VITALS — HEIGHT: 73 IN

## 2022-10-19 DIAGNOSIS — M79.89 OTHER SPECIFIED SOFT TISSUE DISORDERS: ICD-10-CM

## 2022-10-19 PROCEDURE — 93971 EXTREMITY STUDY: CPT

## 2022-10-19 PROCEDURE — 99215 OFFICE O/P EST HI 40 MIN: CPT

## 2022-10-31 ENCOUNTER — APPOINTMENT (OUTPATIENT)
Dept: NEUROLOGY | Facility: CLINIC | Age: 67
End: 2022-10-31

## 2022-10-31 ENCOUNTER — NON-APPOINTMENT (OUTPATIENT)
Age: 67
End: 2022-10-31

## 2022-10-31 VITALS
OXYGEN SATURATION: 97 % | SYSTOLIC BLOOD PRESSURE: 120 MMHG | HEART RATE: 80 BPM | HEIGHT: 73 IN | BODY MASS INDEX: 22 KG/M2 | DIASTOLIC BLOOD PRESSURE: 78 MMHG | WEIGHT: 166 LBS | TEMPERATURE: 96.9 F

## 2022-10-31 PROCEDURE — 99213 OFFICE O/P EST LOW 20 MIN: CPT

## 2022-10-31 RX ORDER — APIXABAN 5 MG/1
5 TABLET, FILM COATED ORAL
Qty: 60 | Refills: 0 | Status: DISCONTINUED | COMMUNITY
Start: 2022-04-04 | End: 2022-10-31

## 2022-11-01 NOTE — HISTORY OF PRESENT ILLNESS
[FreeTextEntry1] : Mike is a 67 year old left-handed Male with PMH HTN,Alcohol abuse,LE DVT, DLD and seizure disorder presented to the office for a follow up.\par Pt is a poor historian accompanied by nephew who is the primary historian.\par Pt was admitted for VEEG 6/2022 and was monitored for 72 hours. VEEG normal.\par Pt missed appt in August and ended up going to the ER for a breakthrough seizure.\par While in the ER random blood level was done and the levels were low. VPA 24, Phenytoin 2.5.\par Somehow despite the changes that were made during VEEG monitoring in June pt was not taking correct dosing.\par States was only taking Depakote 750 mgs BID and Dilantin 100 mgs BID.\par \par Current medications:\par Dilantin 100 mgs BID\par Depakote 750 mg BID \par \par \par 6/2022 Normal VEEG x 72 hrs.\par Did not do the MRI\par \par \par

## 2022-11-01 NOTE — PHYSICAL EXAM
[Cranial Nerves Optic (II)] : visual acuity intact bilaterally,  visual fields full to confrontation, pupils equal round and reactive to light [Cranial Nerves Oculomotor (III)] : extraocular motion intact [Cranial Nerves Trigeminal (V)] : facial sensation intact symmetrically [Cranial Nerves Facial (VII)] : face symmetrical [Cranial Nerves Vestibulocochlear (VIII)] : hearing was intact bilaterally [Cranial Nerves Glossopharyngeal (IX)] : tongue and palate midline [Cranial Nerves Accessory (XI - Cranial And Spinal)] : head turning and shoulder shrug symmetric [Cranial Nerves Hypoglossal (XII)] : there was no tongue deviation with protrusion [Motor Handedness Left-Handed] : the patient is left hand dominant [Tremor] : a tremor present [2+] : Ankle jerk left 2+ [Chronically Ill] : chronically ill [Appears Older] : appears older than stated age [Person] : oriented to person [Place] : oriented to place [Time] : oriented to time [Concentration Intact] : normal concentrating ability [Naming Objects] : no difficulty naming common objects [General Appearance - Alert] : alert [General Appearance - In No Acute Distress] : in no acute distress [Paresis Pronator Drift Right-Sided] : no pronator drift on the right [Paresis Pronator Drift Left-Sided] : no pronator drift on the left [Motor Strength Upper Extremities Bilaterally] : strength was normal in both upper extremities [Motor Strength Lower Extremities Bilaterally] : strength was normal in both lower extremities [Limited Balance] : balance was intact [Past-pointing] : there was no past-pointing [Coordination - Dysmetria Impaired Finger-to-Nose Bilateral] : not present [FreeTextEntry6] : Head tilt to the left [Short Term Intact] : short term memory impaired

## 2022-11-01 NOTE — DISCUSSION/SUMMARY
[Safety Recommendations] : The patient was advised in regards to the risk of seizures and general seizure safety recommendations including not to be bathing alone, climbing to high places and operating heavy machinery. [Compliance with Medications] : The importance of compliance with medications was reinforced. [Medication Side Effects] : High frequency and serious potential medication adverse effects were reviewed with the patient, including but not exclusive to psychiatric effects.  Information sheets on medication side effects were made available to the patient in our clinic.  The patient or advocate agrees to notify us for any concerns. [Sleep Hygiene/Sleep Disruption Risks] : Sleep hygiene and the risks of sleep disruption were discussed. [FreeTextEntry1] : Mike is a 67 year old left-handed Male with PMH HTN,Alcohol abuse,LE DVT, DLD and seizure disorder presented to the office for seizure management. \par \par Plan:\par - instructed pt to go back to correct dosing of medication\par - Depakote 750 mg in am 1000 mg in pm\par - Phenytoin 100 mg in am 300 mg in pm\par - MRI of the brain \par - repeat levels in 2 weeks to evaluate dose increase\par - follow up in 3-4 weeks\par \par Case discussed with Dr. Casas\par \par Shana Batista, DNP, ACNP-BC

## 2022-11-14 NOTE — ED PROVIDER NOTE - NSCAREINITIATED _GEN_ER
I spoke with patient to advise Dr Jansen  And Dr Hamlin want patient to continue   Antibiotics .  Dr Jansen sent in to pharmacy  Azithromycin and Ethambutol  and patient  Was advised to stop Rifabutin   [facility]   11/14/22 Hyacinth Szymanski)

## 2023-01-13 NOTE — ED ADULT NURSE NOTE - PAIN: PRESENCE, MLM
----- Message from Baldev Gonzalez 
Call placed to patient. Results and recommendations are conveyed. Patient is agreeable to proceed with chest CT without contrast. Orders placed.     Patient denies any additional questions or concerns at this time, but is advised to call the clinic if any arise.     
denies pain/discomfort

## 2023-01-18 NOTE — ED ADULT TRIAGE NOTE - NS ED NURSE DIRECT TO ROOM YN
Reports abdominal pain for 2 weeks, states it is right sided but then radiates across entire abdomen. Reports nausea, vomiting, decreased appetite, and watery diarrhea. Reports fatigue and dizziness.     States she was COVID positive 9 days ago.   No

## 2023-06-08 NOTE — DISCHARGE NOTE NURSING/CASE MANAGEMENT/SOCIAL WORK - NSDPACMPNYOTHER_GEN_ALL_CORE
Detailed VM that HH orders entered today by home health md Dr Liao and it was authorized.  Phone  Number for HH left if she would like to call to ask any questions on this at [phone]   ems

## 2023-07-15 NOTE — DISCHARGE NOTE PROVIDER - NSDCMRMEDTOKEN_GEN_ALL_CORE_FT
Depakote 250 mg oral delayed release tablet: 3 tab(s) orally every 12 hours   Dilantin 100 mg oral capsule, extended release: take 2 capsules in the morning and 3 capsules at night  PHENobarbital 32.4 mg oral tablet: take 1 tablet in the morning and 2 tablets before sleep MDD:97.2 mg Melolabial Transposition Flap Text: The defect edges were debeveled with a #15 scalpel blade. Given the location of the defect and the proximity to free margins a melolabial flap was deemed most appropriate. Using a sterile surgical marker, an appropriate melolabial transposition flap was drawn incorporating the defect. The area thus outlined was incised deep to adipose tissue with a #15 scalpel blade. The skin margins were undermined to an appropriate distance in all directions utilizing iris scissors. Following this, the designed flap was carried over into the primary defect and sutured into place.

## 2023-08-28 ENCOUNTER — EMERGENCY (EMERGENCY)
Facility: HOSPITAL | Age: 68
LOS: 0 days | Discharge: HOME | End: 2023-08-28
Attending: EMERGENCY MEDICINE
Payer: MEDICARE

## 2023-08-28 VITALS
OXYGEN SATURATION: 100 % | HEART RATE: 78 BPM | DIASTOLIC BLOOD PRESSURE: 50 MMHG | SYSTOLIC BLOOD PRESSURE: 94 MMHG | TEMPERATURE: 98 F | RESPIRATION RATE: 17 BRPM | WEIGHT: 141.98 LBS

## 2023-08-28 VITALS
SYSTOLIC BLOOD PRESSURE: 115 MMHG | DIASTOLIC BLOOD PRESSURE: 62 MMHG | HEART RATE: 62 BPM | OXYGEN SATURATION: 99 % | RESPIRATION RATE: 18 BRPM

## 2023-08-28 DIAGNOSIS — I95.9 HYPOTENSION, UNSPECIFIED: ICD-10-CM

## 2023-08-28 DIAGNOSIS — G40.909 EPILEPSY, UNSPECIFIED, NOT INTRACTABLE, WITHOUT STATUS EPILEPTICUS: ICD-10-CM

## 2023-08-28 DIAGNOSIS — F17.200 NICOTINE DEPENDENCE, UNSPECIFIED, UNCOMPLICATED: ICD-10-CM

## 2023-08-28 DIAGNOSIS — R74.02 ELEVATION OF LEVELS OF LACTIC ACID DEHYDROGENASE [LDH]: ICD-10-CM

## 2023-08-28 DIAGNOSIS — Z86.2 PERSONAL HISTORY OF DISEASES OF THE BLOOD AND BLOOD-FORMING ORGANS AND CERTAIN DISORDERS INVOLVING THE IMMUNE MECHANISM: ICD-10-CM

## 2023-08-28 DIAGNOSIS — N39.0 URINARY TRACT INFECTION, SITE NOT SPECIFIED: ICD-10-CM

## 2023-08-28 DIAGNOSIS — Z79.01 LONG TERM (CURRENT) USE OF ANTICOAGULANTS: ICD-10-CM

## 2023-08-28 LAB
ALBUMIN SERPL ELPH-MCNC: 4.6 G/DL — SIGNIFICANT CHANGE UP (ref 3.5–5.2)
ALP SERPL-CCNC: 68 U/L — SIGNIFICANT CHANGE UP (ref 30–115)
ALT FLD-CCNC: 36 U/L — SIGNIFICANT CHANGE UP (ref 0–41)
ANION GAP SERPL CALC-SCNC: 8 MMOL/L — SIGNIFICANT CHANGE UP (ref 7–14)
APPEARANCE UR: ABNORMAL
APTT BLD: 37.8 SEC — SIGNIFICANT CHANGE UP (ref 27–39.2)
AST SERPL-CCNC: 29 U/L — SIGNIFICANT CHANGE UP (ref 0–41)
BACTERIA # UR AUTO: ABNORMAL /HPF
BASE EXCESS BLDV CALC-SCNC: -1 MMOL/L — SIGNIFICANT CHANGE UP (ref -2–3)
BASE EXCESS BLDV CALC-SCNC: 4.1 MMOL/L — HIGH (ref -2–3)
BASOPHILS # BLD AUTO: 0.06 K/UL — SIGNIFICANT CHANGE UP (ref 0–0.2)
BASOPHILS NFR BLD AUTO: 0.8 % — SIGNIFICANT CHANGE UP (ref 0–1)
BILIRUB SERPL-MCNC: 0.2 MG/DL — SIGNIFICANT CHANGE UP (ref 0.2–1.2)
BILIRUB UR-MCNC: NEGATIVE — SIGNIFICANT CHANGE UP
BUN SERPL-MCNC: 30 MG/DL — HIGH (ref 10–20)
CA-I SERPL-SCNC: 1.19 MMOL/L — SIGNIFICANT CHANGE UP (ref 1.15–1.33)
CA-I SERPL-SCNC: 1.2 MMOL/L — SIGNIFICANT CHANGE UP (ref 1.15–1.33)
CALCIUM SERPL-MCNC: 9.9 MG/DL — SIGNIFICANT CHANGE UP (ref 8.4–10.4)
CAST: 1 /LPF — SIGNIFICANT CHANGE UP (ref 0–4)
CHLORIDE SERPL-SCNC: 106 MMOL/L — SIGNIFICANT CHANGE UP (ref 98–110)
CO2 SERPL-SCNC: 31 MMOL/L — SIGNIFICANT CHANGE UP (ref 17–32)
COLOR SPEC: YELLOW — SIGNIFICANT CHANGE UP
CREAT SERPL-MCNC: 1.1 MG/DL — SIGNIFICANT CHANGE UP (ref 0.7–1.5)
DIFF PNL FLD: ABNORMAL
EGFR: 74 ML/MIN/1.73M2 — SIGNIFICANT CHANGE UP
EOSINOPHIL # BLD AUTO: 0.17 K/UL — SIGNIFICANT CHANGE UP (ref 0–0.7)
EOSINOPHIL NFR BLD AUTO: 2.3 % — SIGNIFICANT CHANGE UP (ref 0–8)
GAS PNL BLDV: 135 MMOL/L — LOW (ref 136–145)
GAS PNL BLDV: 139 MMOL/L — SIGNIFICANT CHANGE UP (ref 136–145)
GAS PNL BLDV: SIGNIFICANT CHANGE UP
GLUCOSE SERPL-MCNC: 112 MG/DL — HIGH (ref 70–99)
GLUCOSE UR QL: NEGATIVE MG/DL — SIGNIFICANT CHANGE UP
HCO3 BLDV-SCNC: 26 MMOL/L — SIGNIFICANT CHANGE UP (ref 22–29)
HCO3 BLDV-SCNC: 32 MMOL/L — HIGH (ref 22–29)
HCT VFR BLD CALC: 43.4 % — SIGNIFICANT CHANGE UP (ref 42–52)
HCT VFR BLDA CALC: 32 % — LOW (ref 39–51)
HCT VFR BLDA CALC: 38 % — LOW (ref 39–51)
HGB BLD CALC-MCNC: 10.8 G/DL — LOW (ref 12.6–17.4)
HGB BLD CALC-MCNC: 12.8 G/DL — SIGNIFICANT CHANGE UP (ref 12.6–17.4)
HGB BLD-MCNC: 14.6 G/DL — SIGNIFICANT CHANGE UP (ref 14–18)
IMM GRANULOCYTES NFR BLD AUTO: 0.4 % — HIGH (ref 0.1–0.3)
INR BLD: 1.07 RATIO — SIGNIFICANT CHANGE UP (ref 0.65–1.3)
KETONES UR-MCNC: NEGATIVE MG/DL — SIGNIFICANT CHANGE UP
LACTATE BLDV-MCNC: 1.3 MMOL/L — SIGNIFICANT CHANGE UP (ref 0.5–2)
LACTATE BLDV-MCNC: 6.2 MMOL/L — CRITICAL HIGH (ref 0.5–2)
LACTATE SERPL-SCNC: 1.1 MMOL/L — SIGNIFICANT CHANGE UP (ref 0.7–2)
LACTATE SERPL-SCNC: 3.1 MMOL/L — HIGH (ref 0.7–2)
LEUKOCYTE ESTERASE UR-ACNC: ABNORMAL
LIDOCAIN IGE QN: 32 U/L — SIGNIFICANT CHANGE UP (ref 7–60)
LYMPHOCYTES # BLD AUTO: 2.3 K/UL — SIGNIFICANT CHANGE UP (ref 1.2–3.4)
LYMPHOCYTES # BLD AUTO: 31.6 % — SIGNIFICANT CHANGE UP (ref 20.5–51.1)
MAGNESIUM SERPL-MCNC: 2.3 MG/DL — SIGNIFICANT CHANGE UP (ref 1.8–2.4)
MCHC RBC-ENTMCNC: 31.5 PG — HIGH (ref 27–31)
MCHC RBC-ENTMCNC: 33.6 G/DL — SIGNIFICANT CHANGE UP (ref 32–37)
MCV RBC AUTO: 93.5 FL — SIGNIFICANT CHANGE UP (ref 80–94)
MONOCYTES # BLD AUTO: 0.58 K/UL — SIGNIFICANT CHANGE UP (ref 0.1–0.6)
MONOCYTES NFR BLD AUTO: 8 % — SIGNIFICANT CHANGE UP (ref 1.7–9.3)
NEUTROPHILS # BLD AUTO: 4.14 K/UL — SIGNIFICANT CHANGE UP (ref 1.4–6.5)
NEUTROPHILS NFR BLD AUTO: 56.9 % — SIGNIFICANT CHANGE UP (ref 42.2–75.2)
NITRITE UR-MCNC: POSITIVE
NRBC # BLD: 0 /100 WBCS — SIGNIFICANT CHANGE UP (ref 0–0)
PCO2 BLDV: 51 MMHG — SIGNIFICANT CHANGE UP (ref 42–55)
PCO2 BLDV: 62 MMHG — HIGH (ref 42–55)
PH BLDV: 7.31 — LOW (ref 7.32–7.43)
PH BLDV: 7.32 — SIGNIFICANT CHANGE UP (ref 7.32–7.43)
PH UR: 6.5 — SIGNIFICANT CHANGE UP (ref 5–8)
PHENYTOIN FREE SERPL-MCNC: 1.4 UG/ML — LOW (ref 10–20)
PLATELET # BLD AUTO: 165 K/UL — SIGNIFICANT CHANGE UP (ref 130–400)
PMV BLD: 10.4 FL — SIGNIFICANT CHANGE UP (ref 7.4–10.4)
PO2 BLDV: 15 MMHG — SIGNIFICANT CHANGE UP
PO2 BLDV: 21 MMHG — SIGNIFICANT CHANGE UP
POTASSIUM BLDV-SCNC: 3.9 MMOL/L — SIGNIFICANT CHANGE UP (ref 3.5–5.1)
POTASSIUM BLDV-SCNC: 4.2 MMOL/L — SIGNIFICANT CHANGE UP (ref 3.5–5.1)
POTASSIUM SERPL-MCNC: 5.8 MMOL/L — HIGH (ref 3.5–5)
POTASSIUM SERPL-SCNC: 5.8 MMOL/L — HIGH (ref 3.5–5)
PROT SERPL-MCNC: 7.1 G/DL — SIGNIFICANT CHANGE UP (ref 6–8)
PROT UR-MCNC: SIGNIFICANT CHANGE UP MG/DL
PROTHROM AB SERPL-ACNC: 12.2 SEC — SIGNIFICANT CHANGE UP (ref 9.95–12.87)
RBC # BLD: 4.64 M/UL — LOW (ref 4.7–6.1)
RBC # FLD: 14.5 % — SIGNIFICANT CHANGE UP (ref 11.5–14.5)
RBC CASTS # UR COMP ASSIST: 19 /HPF — HIGH (ref 0–4)
SAO2 % BLDV: 19.2 % — SIGNIFICANT CHANGE UP
SAO2 % BLDV: 29.9 % — SIGNIFICANT CHANGE UP
SODIUM SERPL-SCNC: 145 MMOL/L — SIGNIFICANT CHANGE UP (ref 135–146)
SP GR SPEC: 1.03 — SIGNIFICANT CHANGE UP (ref 1–1.03)
SQUAMOUS # UR AUTO: 5 /HPF — SIGNIFICANT CHANGE UP (ref 0–5)
UROBILINOGEN FLD QL: 1 MG/DL — SIGNIFICANT CHANGE UP (ref 0.2–1)
VALPROATE SERPL-MCNC: <3 UG/ML — LOW (ref 50–100)
WBC # BLD: 7.28 K/UL — SIGNIFICANT CHANGE UP (ref 4.8–10.8)
WBC # FLD AUTO: 7.28 K/UL — SIGNIFICANT CHANGE UP (ref 4.8–10.8)
WBC UR QL: 81 /HPF — HIGH (ref 0–5)

## 2023-08-28 PROCEDURE — 82330 ASSAY OF CALCIUM: CPT

## 2023-08-28 PROCEDURE — 84295 ASSAY OF SERUM SODIUM: CPT

## 2023-08-28 PROCEDURE — 87040 BLOOD CULTURE FOR BACTERIA: CPT

## 2023-08-28 PROCEDURE — 87186 SC STD MICRODIL/AGAR DIL: CPT

## 2023-08-28 PROCEDURE — 85018 HEMOGLOBIN: CPT

## 2023-08-28 PROCEDURE — 81001 URINALYSIS AUTO W/SCOPE: CPT

## 2023-08-28 PROCEDURE — 85014 HEMATOCRIT: CPT

## 2023-08-28 PROCEDURE — 85025 COMPLETE CBC W/AUTO DIFF WBC: CPT

## 2023-08-28 PROCEDURE — 71046 X-RAY EXAM CHEST 2 VIEWS: CPT | Mod: 26

## 2023-08-28 PROCEDURE — 36415 COLL VENOUS BLD VENIPUNCTURE: CPT

## 2023-08-28 PROCEDURE — 83605 ASSAY OF LACTIC ACID: CPT

## 2023-08-28 PROCEDURE — 99283 EMERGENCY DEPT VISIT LOW MDM: CPT | Mod: 25

## 2023-08-28 PROCEDURE — 85610 PROTHROMBIN TIME: CPT

## 2023-08-28 PROCEDURE — 99284 EMERGENCY DEPT VISIT MOD MDM: CPT

## 2023-08-28 PROCEDURE — 85730 THROMBOPLASTIN TIME PARTIAL: CPT

## 2023-08-28 PROCEDURE — 83690 ASSAY OF LIPASE: CPT

## 2023-08-28 PROCEDURE — 80185 ASSAY OF PHENYTOIN TOTAL: CPT

## 2023-08-28 PROCEDURE — 84132 ASSAY OF SERUM POTASSIUM: CPT

## 2023-08-28 PROCEDURE — 87086 URINE CULTURE/COLONY COUNT: CPT

## 2023-08-28 PROCEDURE — 83735 ASSAY OF MAGNESIUM: CPT

## 2023-08-28 PROCEDURE — 71046 X-RAY EXAM CHEST 2 VIEWS: CPT

## 2023-08-28 PROCEDURE — 80164 ASSAY DIPROPYLACETIC ACD TOT: CPT

## 2023-08-28 PROCEDURE — 80053 COMPREHEN METABOLIC PANEL: CPT

## 2023-08-28 PROCEDURE — 82803 BLOOD GASES ANY COMBINATION: CPT

## 2023-08-28 RX ORDER — SODIUM CHLORIDE 9 MG/ML
2000 INJECTION, SOLUTION INTRAVENOUS ONCE
Refills: 0 | Status: COMPLETED | OUTPATIENT
Start: 2023-08-28 | End: 2023-08-28

## 2023-08-28 RX ORDER — CEFPODOXIME PROXETIL 100 MG
200 TABLET ORAL ONCE
Refills: 0 | Status: COMPLETED | OUTPATIENT
Start: 2023-08-28 | End: 2023-08-28

## 2023-08-28 RX ORDER — CEFPODOXIME PROXETIL 100 MG
1 TABLET ORAL
Qty: 14 | Refills: 0
Start: 2023-08-28 | End: 2023-09-03

## 2023-08-28 RX ADMIN — SODIUM CHLORIDE 2000 MILLILITER(S): 9 INJECTION, SOLUTION INTRAVENOUS at 17:00

## 2023-08-28 RX ADMIN — Medication 200 MILLIGRAM(S): at 19:28

## 2023-08-28 NOTE — ED PROVIDER NOTE - NSFOLLOWUPINSTRUCTIONS_ED_ALL_ED_FT
TAKE YOUR SEIZURE MEDS    FOLLOW UP WITH YOUR PMD, CHECK PSA LEVELS        Urinary Tract Infection, Adult  ImageA urinary tract infection (UTI) is an infection of any part of the urinary tract, which includes the kidneys, ureters, bladder, and urethra. These organs make, store, and get rid of urine in the body. UTI can be a bladder infection (cystitis) or kidney infection (pyelonephritis).    What are the causes?  This infection may be caused by fungi, viruses, or bacteria. Bacteria are the most common cause of UTIs. This condition can also be caused by repeated incomplete emptying of the bladder during urination.    What increases the risk?  This condition is more likely to develop if:    You ignore your need to urinate or hold urine for long periods of time.  You do not empty your bladder completely during urination.  You wipe back to front after urinating or having a bowel movement, if you are female.  You are uncircumcised, if you are male.  You are constipated.  You have a urinary catheter that stays in place (indwelling).  You have a weak defense (immune) system.  You have a medical condition that affects your bowels, kidneys, or bladder.  You have diabetes.  You take antibiotic medicines frequently or for long periods of time, and the antibiotics no longer work well against certain types of infections (antibiotic resistance).  You take medicines that irritate your urinary tract.  You are exposed to chemicals that irritate your urinary tract.  You are female.    What are the signs or symptoms?  Symptoms of this condition include:    Fever.  Frequent urination or passing small amounts of urine frequently.  Needing to urinate urgently.  Pain or burning with urination.  Urine that smells bad or unusual.  Cloudy urine.  Pain in the lower abdomen or back.  Trouble urinating.  Blood in the urine.  Vomiting or being less hungry than normal.  Diarrhea or abdominal pain.  Vaginal discharge, if you are female.    How is this diagnosed?  This condition is diagnosed with a medical history and physical exam. You will also need to provide a urine sample to test your urine. Other tests may be done, including:    Blood tests.  Sexually transmitted disease (STD) testing.    If you have had more than one UTI, a cystoscopy or imaging studies may be done to determine the cause of the infections.    How is this treated?  Treatment for this condition often includes a combination of two or more of the following:    Antibiotic medicine.  Other medicines to treat less common causes of UTI.  Over-the-counter medicines to treat pain.  Drinking enough water to stay hydrated.    Follow these instructions at home:  Take over-the-counter and prescription medicines only as told by your health care provider.  If you were prescribed an antibiotic, take it as told by your health care provider. Do not stop taking the antibiotic even if you start to feel better.  Avoid alcohol, caffeine, tea, and carbonated beverages. They can irritate your bladder.  Drink enough fluid to keep your urine clear or pale yellow.  Keep all follow-up visits as told by your health care provider. This is important.  ImageMake sure to:    Empty your bladder often and completely. Do not hold urine for long periods of time.  Empty your bladder before and after sex.  Wipe from front to back after a bowel movement if you are female. Use each tissue one time when you wipe.    Contact a health care provider if:  You have back pain.  You have a fever.  You feel nauseous or vomit.  Your symptoms do not get better after 3 days.  Your symptoms go away and then return.  Get help right away if:  You have severe back pain or lower abdominal pain.  You are vomiting and cannot keep down any medicines or water.  This information is not intended to replace advice given to you by your health care provider. Make sure you discuss any questions you have with your health care provider.

## 2023-08-28 NOTE — ED ADULT NURSE NOTE - NSFALLUNIVINTERV_ED_ALL_ED
Bed/Stretcher in lowest position, wheels locked, appropriate side rails in place/Call bell, personal items and telephone in reach/Instruct patient to call for assistance before getting out of bed/chair/stretcher/Non-slip footwear applied when patient is off stretcher/[place] to call system/Physically safe environment - no spills, clutter or unnecessary equipment/Purposeful proactive rounding/Room/bathroom lighting operational, light cord in reach

## 2023-08-28 NOTE — ED PROVIDER NOTE - OBJECTIVE STATEMENT
Pt is a 69y/o male with a pmhx of Epilepsy, hematologic dz on AC sent in by PMD Dr. Alcaraz for concern of sepsis after recent routine visit found pt to be hypotensive with + urine culture. Pt denies fever, chills, dysuria, hematuria, abd pain. Pt does note 25lb weight loss in 1 month period.

## 2023-08-28 NOTE — ED PROVIDER NOTE - CLINICAL SUMMARY MEDICAL DECISION MAKING FREE TEXT BOX
68-year-old male presented to the emergency department for concern for sepsis versus UTI.  Patient had labs which demonstrated mildly elevated lactate at 3.1 which was repeated after fluids and was 6.2 with concern for hemolysis so it was repeated again and it was within normal limits.  Patient was found to have a nitrite positive UTI but no flank pain making pyleo unlikely. Pt started on antibiotics. Do valproic acid and phenytoin levels which are subtherapeutic discussed with family to follow-up for changing the dosage.  No recent seizures. Discussed plan with family and strict return precautions.

## 2023-08-28 NOTE — ED PROVIDER NOTE - PATIENT PORTAL LINK FT
You can access the FollowMyHealth Patient Portal offered by [facility] by registering at the following website: [email or web address]. By joining [facility]’s FollowMyHealth portal, you will also be able to view your health information using other applications (apps) compatible with our system.

## 2023-08-28 NOTE — ED PROVIDER NOTE - ATTENDING APP SHARED VISIT CONTRIBUTION OF CARE
68-year-old male with past medical history of epilepsy, hematologic disorder on anticoagulation was admitted to the emergency department after he was found to have a positive urine culture with an episode of hypotension.  Patient does not have any concerns at this time no fever no chills no dysuria no chest pain or shortness of breath.    Const: NAD  Eyes: PERRL, no conjunctival injection  HENT:  Neck supple without meningismus   CV: RRR, Warm, well-perfused extremities  RESP: CTA B/L, no tachypnea   GI: soft, non-tender, non-distended  MSK: No gross deformities appreciated  Skin: Warm, dry. No rashes  Neuro: Alert, CNs II-XII grossly intact. Sensation and motor function of extremities grossly intact.

## 2023-09-02 LAB
CULTURE RESULTS: SIGNIFICANT CHANGE UP
CULTURE RESULTS: SIGNIFICANT CHANGE UP
SPECIMEN SOURCE: SIGNIFICANT CHANGE UP
SPECIMEN SOURCE: SIGNIFICANT CHANGE UP

## 2023-10-03 ENCOUNTER — APPOINTMENT (OUTPATIENT)
Dept: UROLOGY | Facility: CLINIC | Age: 68
End: 2023-10-03
Payer: MEDICARE

## 2023-10-03 VITALS
BODY MASS INDEX: 22 KG/M2 | WEIGHT: 166 LBS | SYSTOLIC BLOOD PRESSURE: 125 MMHG | TEMPERATURE: 98 F | DIASTOLIC BLOOD PRESSURE: 80 MMHG | HEIGHT: 73 IN

## 2023-10-03 PROCEDURE — 81003 URINALYSIS AUTO W/O SCOPE: CPT | Mod: QW

## 2023-10-03 PROCEDURE — 99204 OFFICE O/P NEW MOD 45 MIN: CPT

## 2023-10-04 LAB
BILIRUB UR QL STRIP: NORMAL
COLLECTION METHOD: NORMAL
GLUCOSE UR-MCNC: NORMAL
HCG UR QL: 1 EU/DL
HGB UR QL STRIP.AUTO: NORMAL
KETONES UR-MCNC: 40
LEUKOCYTE ESTERASE UR QL STRIP: NORMAL
NITRITE UR QL STRIP: NORMAL
PH UR STRIP: 5.5
PROT UR STRIP-MCNC: NORMAL
SP GR UR STRIP: 1.02

## 2023-10-05 LAB — BACTERIA UR CULT: NORMAL

## 2023-10-16 ENCOUNTER — APPOINTMENT (OUTPATIENT)
Dept: NEUROLOGY | Facility: CLINIC | Age: 68
End: 2023-10-16
Payer: MEDICARE

## 2023-10-16 VITALS
BODY MASS INDEX: 18.95 KG/M2 | TEMPERATURE: 97.9 F | DIASTOLIC BLOOD PRESSURE: 70 MMHG | OXYGEN SATURATION: 97 % | HEART RATE: 88 BPM | SYSTOLIC BLOOD PRESSURE: 118 MMHG | WEIGHT: 143 LBS | HEIGHT: 73 IN

## 2023-10-16 PROCEDURE — 99213 OFFICE O/P EST LOW 20 MIN: CPT

## 2023-11-17 ENCOUNTER — EMERGENCY (EMERGENCY)
Facility: HOSPITAL | Age: 68
LOS: 0 days | Discharge: HOME | End: 2023-11-18
Attending: STUDENT IN AN ORGANIZED HEALTH CARE EDUCATION/TRAINING PROGRAM
Payer: MEDICARE

## 2023-11-17 VITALS
OXYGEN SATURATION: 99 % | TEMPERATURE: 98 F | DIASTOLIC BLOOD PRESSURE: 60 MMHG | SYSTOLIC BLOOD PRESSURE: 127 MMHG | HEART RATE: 90 BPM | WEIGHT: 134.92 LBS | RESPIRATION RATE: 18 BRPM | HEIGHT: 73 IN

## 2023-11-17 DIAGNOSIS — T42.6X6A UNDERDOSING OF OTHER ANTIEPILEPTIC AND SEDATIVE-HYPNOTIC DRUGS, INITIAL ENCOUNTER: ICD-10-CM

## 2023-11-17 DIAGNOSIS — Z91.148 PATIENT'S OTHER NONCOMPLIANCE WITH MEDICATION REGIMEN FOR OTHER REASON: ICD-10-CM

## 2023-11-17 DIAGNOSIS — G40.909 EPILEPSY, UNSPECIFIED, NOT INTRACTABLE, WITHOUT STATUS EPILEPTICUS: ICD-10-CM

## 2023-11-17 DIAGNOSIS — F17.200 NICOTINE DEPENDENCE, UNSPECIFIED, UNCOMPLICATED: ICD-10-CM

## 2023-11-17 DIAGNOSIS — E78.5 HYPERLIPIDEMIA, UNSPECIFIED: ICD-10-CM

## 2023-11-17 DIAGNOSIS — I10 ESSENTIAL (PRIMARY) HYPERTENSION: ICD-10-CM

## 2023-11-17 DIAGNOSIS — Z86.718 PERSONAL HISTORY OF OTHER VENOUS THROMBOSIS AND EMBOLISM: ICD-10-CM

## 2023-11-17 DIAGNOSIS — T42.0X6A UNDERDOSING OF HYDANTOIN DERIVATIVES, INITIAL ENCOUNTER: ICD-10-CM

## 2023-11-17 DIAGNOSIS — Z79.01 LONG TERM (CURRENT) USE OF ANTICOAGULANTS: ICD-10-CM

## 2023-11-17 DIAGNOSIS — R56.9 UNSPECIFIED CONVULSIONS: ICD-10-CM

## 2023-11-17 DIAGNOSIS — R91.8 OTHER NONSPECIFIC ABNORMAL FINDING OF LUNG FIELD: ICD-10-CM

## 2023-11-17 LAB
ALBUMIN SERPL ELPH-MCNC: 4.3 G/DL — SIGNIFICANT CHANGE UP (ref 3.5–5.2)
ALBUMIN SERPL ELPH-MCNC: 4.3 G/DL — SIGNIFICANT CHANGE UP (ref 3.5–5.2)
ALP SERPL-CCNC: 47 U/L — SIGNIFICANT CHANGE UP (ref 30–115)
ALP SERPL-CCNC: 47 U/L — SIGNIFICANT CHANGE UP (ref 30–115)
ALT FLD-CCNC: 27 U/L — SIGNIFICANT CHANGE UP (ref 0–41)
ALT FLD-CCNC: 27 U/L — SIGNIFICANT CHANGE UP (ref 0–41)
ANION GAP SERPL CALC-SCNC: 8 MMOL/L — SIGNIFICANT CHANGE UP (ref 7–14)
ANION GAP SERPL CALC-SCNC: 8 MMOL/L — SIGNIFICANT CHANGE UP (ref 7–14)
APPEARANCE UR: CLEAR — SIGNIFICANT CHANGE UP
APPEARANCE UR: CLEAR — SIGNIFICANT CHANGE UP
AST SERPL-CCNC: 21 U/L — SIGNIFICANT CHANGE UP (ref 0–41)
AST SERPL-CCNC: 21 U/L — SIGNIFICANT CHANGE UP (ref 0–41)
BASOPHILS # BLD AUTO: 0.04 K/UL — SIGNIFICANT CHANGE UP (ref 0–0.2)
BASOPHILS # BLD AUTO: 0.04 K/UL — SIGNIFICANT CHANGE UP (ref 0–0.2)
BASOPHILS NFR BLD AUTO: 0.7 % — SIGNIFICANT CHANGE UP (ref 0–1)
BASOPHILS NFR BLD AUTO: 0.7 % — SIGNIFICANT CHANGE UP (ref 0–1)
BILIRUB SERPL-MCNC: 0.2 MG/DL — SIGNIFICANT CHANGE UP (ref 0.2–1.2)
BILIRUB SERPL-MCNC: 0.2 MG/DL — SIGNIFICANT CHANGE UP (ref 0.2–1.2)
BILIRUB UR-MCNC: NEGATIVE — SIGNIFICANT CHANGE UP
BILIRUB UR-MCNC: NEGATIVE — SIGNIFICANT CHANGE UP
BUN SERPL-MCNC: 15 MG/DL — SIGNIFICANT CHANGE UP (ref 10–20)
BUN SERPL-MCNC: 15 MG/DL — SIGNIFICANT CHANGE UP (ref 10–20)
CALCIUM SERPL-MCNC: 9.3 MG/DL — SIGNIFICANT CHANGE UP (ref 8.4–10.4)
CALCIUM SERPL-MCNC: 9.3 MG/DL — SIGNIFICANT CHANGE UP (ref 8.4–10.4)
CHLORIDE SERPL-SCNC: 104 MMOL/L — SIGNIFICANT CHANGE UP (ref 98–110)
CHLORIDE SERPL-SCNC: 104 MMOL/L — SIGNIFICANT CHANGE UP (ref 98–110)
CO2 SERPL-SCNC: 28 MMOL/L — SIGNIFICANT CHANGE UP (ref 17–32)
CO2 SERPL-SCNC: 28 MMOL/L — SIGNIFICANT CHANGE UP (ref 17–32)
COLOR SPEC: YELLOW — SIGNIFICANT CHANGE UP
COLOR SPEC: YELLOW — SIGNIFICANT CHANGE UP
CREAT SERPL-MCNC: 0.9 MG/DL — SIGNIFICANT CHANGE UP (ref 0.7–1.5)
CREAT SERPL-MCNC: 0.9 MG/DL — SIGNIFICANT CHANGE UP (ref 0.7–1.5)
DIFF PNL FLD: ABNORMAL
DIFF PNL FLD: ABNORMAL
EGFR: 94 ML/MIN/1.73M2 — SIGNIFICANT CHANGE UP
EGFR: 94 ML/MIN/1.73M2 — SIGNIFICANT CHANGE UP
EOSINOPHIL # BLD AUTO: 0.08 K/UL — SIGNIFICANT CHANGE UP (ref 0–0.7)
EOSINOPHIL # BLD AUTO: 0.08 K/UL — SIGNIFICANT CHANGE UP (ref 0–0.7)
EOSINOPHIL NFR BLD AUTO: 1.3 % — SIGNIFICANT CHANGE UP (ref 0–8)
EOSINOPHIL NFR BLD AUTO: 1.3 % — SIGNIFICANT CHANGE UP (ref 0–8)
GAS PNL BLDV: SIGNIFICANT CHANGE UP
GAS PNL BLDV: SIGNIFICANT CHANGE UP
GLUCOSE SERPL-MCNC: 85 MG/DL — SIGNIFICANT CHANGE UP (ref 70–99)
GLUCOSE SERPL-MCNC: 85 MG/DL — SIGNIFICANT CHANGE UP (ref 70–99)
GLUCOSE UR QL: NEGATIVE MG/DL — SIGNIFICANT CHANGE UP
GLUCOSE UR QL: NEGATIVE MG/DL — SIGNIFICANT CHANGE UP
HCT VFR BLD CALC: 35.6 % — LOW (ref 42–52)
HCT VFR BLD CALC: 35.6 % — LOW (ref 42–52)
HGB BLD-MCNC: 12.2 G/DL — LOW (ref 14–18)
HGB BLD-MCNC: 12.2 G/DL — LOW (ref 14–18)
IMM GRANULOCYTES NFR BLD AUTO: 0.3 % — SIGNIFICANT CHANGE UP (ref 0.1–0.3)
IMM GRANULOCYTES NFR BLD AUTO: 0.3 % — SIGNIFICANT CHANGE UP (ref 0.1–0.3)
KETONES UR-MCNC: 15 MG/DL
KETONES UR-MCNC: 15 MG/DL
LEUKOCYTE ESTERASE UR-ACNC: NEGATIVE — SIGNIFICANT CHANGE UP
LEUKOCYTE ESTERASE UR-ACNC: NEGATIVE — SIGNIFICANT CHANGE UP
LYMPHOCYTES # BLD AUTO: 1.59 K/UL — SIGNIFICANT CHANGE UP (ref 1.2–3.4)
LYMPHOCYTES # BLD AUTO: 1.59 K/UL — SIGNIFICANT CHANGE UP (ref 1.2–3.4)
LYMPHOCYTES # BLD AUTO: 26.8 % — SIGNIFICANT CHANGE UP (ref 20.5–51.1)
LYMPHOCYTES # BLD AUTO: 26.8 % — SIGNIFICANT CHANGE UP (ref 20.5–51.1)
MAGNESIUM SERPL-MCNC: 1.7 MG/DL — LOW (ref 1.8–2.4)
MAGNESIUM SERPL-MCNC: 1.7 MG/DL — LOW (ref 1.8–2.4)
MCHC RBC-ENTMCNC: 31.4 PG — HIGH (ref 27–31)
MCHC RBC-ENTMCNC: 31.4 PG — HIGH (ref 27–31)
MCHC RBC-ENTMCNC: 34.3 G/DL — SIGNIFICANT CHANGE UP (ref 32–37)
MCHC RBC-ENTMCNC: 34.3 G/DL — SIGNIFICANT CHANGE UP (ref 32–37)
MCV RBC AUTO: 91.5 FL — SIGNIFICANT CHANGE UP (ref 80–94)
MCV RBC AUTO: 91.5 FL — SIGNIFICANT CHANGE UP (ref 80–94)
MONOCYTES # BLD AUTO: 0.39 K/UL — SIGNIFICANT CHANGE UP (ref 0.1–0.6)
MONOCYTES # BLD AUTO: 0.39 K/UL — SIGNIFICANT CHANGE UP (ref 0.1–0.6)
MONOCYTES NFR BLD AUTO: 6.6 % — SIGNIFICANT CHANGE UP (ref 1.7–9.3)
MONOCYTES NFR BLD AUTO: 6.6 % — SIGNIFICANT CHANGE UP (ref 1.7–9.3)
NEUTROPHILS # BLD AUTO: 3.81 K/UL — SIGNIFICANT CHANGE UP (ref 1.4–6.5)
NEUTROPHILS # BLD AUTO: 3.81 K/UL — SIGNIFICANT CHANGE UP (ref 1.4–6.5)
NEUTROPHILS NFR BLD AUTO: 64.3 % — SIGNIFICANT CHANGE UP (ref 42.2–75.2)
NEUTROPHILS NFR BLD AUTO: 64.3 % — SIGNIFICANT CHANGE UP (ref 42.2–75.2)
NITRITE UR-MCNC: NEGATIVE — SIGNIFICANT CHANGE UP
NITRITE UR-MCNC: NEGATIVE — SIGNIFICANT CHANGE UP
NRBC # BLD: 0 /100 WBCS — SIGNIFICANT CHANGE UP (ref 0–0)
NRBC # BLD: 0 /100 WBCS — SIGNIFICANT CHANGE UP (ref 0–0)
PH UR: 6.5 — SIGNIFICANT CHANGE UP (ref 5–8)
PH UR: 6.5 — SIGNIFICANT CHANGE UP (ref 5–8)
PHENYTOIN FREE SERPL-MCNC: 10 UG/ML — SIGNIFICANT CHANGE UP (ref 10–20)
PHENYTOIN FREE SERPL-MCNC: 10 UG/ML — SIGNIFICANT CHANGE UP (ref 10–20)
PLATELET # BLD AUTO: 148 K/UL — SIGNIFICANT CHANGE UP (ref 130–400)
PLATELET # BLD AUTO: 148 K/UL — SIGNIFICANT CHANGE UP (ref 130–400)
PMV BLD: 9.8 FL — SIGNIFICANT CHANGE UP (ref 7.4–10.4)
PMV BLD: 9.8 FL — SIGNIFICANT CHANGE UP (ref 7.4–10.4)
POTASSIUM SERPL-MCNC: 4.7 MMOL/L — SIGNIFICANT CHANGE UP (ref 3.5–5)
POTASSIUM SERPL-MCNC: 4.7 MMOL/L — SIGNIFICANT CHANGE UP (ref 3.5–5)
POTASSIUM SERPL-SCNC: 4.7 MMOL/L — SIGNIFICANT CHANGE UP (ref 3.5–5)
POTASSIUM SERPL-SCNC: 4.7 MMOL/L — SIGNIFICANT CHANGE UP (ref 3.5–5)
PROT SERPL-MCNC: 6.4 G/DL — SIGNIFICANT CHANGE UP (ref 6–8)
PROT SERPL-MCNC: 6.4 G/DL — SIGNIFICANT CHANGE UP (ref 6–8)
PROT UR-MCNC: NEGATIVE MG/DL — SIGNIFICANT CHANGE UP
PROT UR-MCNC: NEGATIVE MG/DL — SIGNIFICANT CHANGE UP
RBC # BLD: 3.89 M/UL — LOW (ref 4.7–6.1)
RBC # BLD: 3.89 M/UL — LOW (ref 4.7–6.1)
RBC # FLD: 13.4 % — SIGNIFICANT CHANGE UP (ref 11.5–14.5)
RBC # FLD: 13.4 % — SIGNIFICANT CHANGE UP (ref 11.5–14.5)
SODIUM SERPL-SCNC: 140 MMOL/L — SIGNIFICANT CHANGE UP (ref 135–146)
SODIUM SERPL-SCNC: 140 MMOL/L — SIGNIFICANT CHANGE UP (ref 135–146)
SP GR SPEC: 1.02 — SIGNIFICANT CHANGE UP (ref 1–1.03)
SP GR SPEC: 1.02 — SIGNIFICANT CHANGE UP (ref 1–1.03)
UROBILINOGEN FLD QL: 1 MG/DL — SIGNIFICANT CHANGE UP (ref 0.2–1)
UROBILINOGEN FLD QL: 1 MG/DL — SIGNIFICANT CHANGE UP (ref 0.2–1)
VALPROATE SERPL-MCNC: 17 UG/ML — LOW (ref 50–100)
VALPROATE SERPL-MCNC: 17 UG/ML — LOW (ref 50–100)
WBC # BLD: 5.93 K/UL — SIGNIFICANT CHANGE UP (ref 4.8–10.8)
WBC # BLD: 5.93 K/UL — SIGNIFICANT CHANGE UP (ref 4.8–10.8)
WBC # FLD AUTO: 5.93 K/UL — SIGNIFICANT CHANGE UP (ref 4.8–10.8)
WBC # FLD AUTO: 5.93 K/UL — SIGNIFICANT CHANGE UP (ref 4.8–10.8)

## 2023-11-17 PROCEDURE — 80053 COMPREHEN METABOLIC PANEL: CPT

## 2023-11-17 PROCEDURE — 70450 CT HEAD/BRAIN W/O DYE: CPT | Mod: MA

## 2023-11-17 PROCEDURE — 87086 URINE CULTURE/COLONY COUNT: CPT

## 2023-11-17 PROCEDURE — 81001 URINALYSIS AUTO W/SCOPE: CPT

## 2023-11-17 PROCEDURE — 99285 EMERGENCY DEPT VISIT HI MDM: CPT

## 2023-11-17 PROCEDURE — 85014 HEMATOCRIT: CPT

## 2023-11-17 PROCEDURE — 36415 COLL VENOUS BLD VENIPUNCTURE: CPT

## 2023-11-17 PROCEDURE — 84295 ASSAY OF SERUM SODIUM: CPT

## 2023-11-17 PROCEDURE — 83605 ASSAY OF LACTIC ACID: CPT

## 2023-11-17 PROCEDURE — 83735 ASSAY OF MAGNESIUM: CPT

## 2023-11-17 PROCEDURE — 71046 X-RAY EXAM CHEST 2 VIEWS: CPT | Mod: 26

## 2023-11-17 PROCEDURE — 85025 COMPLETE CBC W/AUTO DIFF WBC: CPT

## 2023-11-17 PROCEDURE — 80164 ASSAY DIPROPYLACETIC ACD TOT: CPT

## 2023-11-17 PROCEDURE — 70450 CT HEAD/BRAIN W/O DYE: CPT | Mod: 26,MA

## 2023-11-17 PROCEDURE — 82330 ASSAY OF CALCIUM: CPT

## 2023-11-17 PROCEDURE — 99284 EMERGENCY DEPT VISIT MOD MDM: CPT | Mod: 25

## 2023-11-17 PROCEDURE — 82803 BLOOD GASES ANY COMBINATION: CPT

## 2023-11-17 PROCEDURE — 80185 ASSAY OF PHENYTOIN TOTAL: CPT

## 2023-11-17 PROCEDURE — 85018 HEMOGLOBIN: CPT

## 2023-11-17 PROCEDURE — 84132 ASSAY OF SERUM POTASSIUM: CPT

## 2023-11-17 PROCEDURE — 71046 X-RAY EXAM CHEST 2 VIEWS: CPT

## 2023-11-17 PROCEDURE — 96374 THER/PROPH/DIAG INJ IV PUSH: CPT

## 2023-11-17 RX ORDER — VALPROIC ACID (AS SODIUM SALT) 250 MG/5ML
4 SOLUTION, ORAL ORAL
Qty: 240 | Refills: 0
Start: 2023-11-17 | End: 2023-12-16

## 2023-11-17 RX ORDER — VALPROIC ACID (AS SODIUM SALT) 250 MG/5ML
250 SOLUTION, ORAL ORAL ONCE
Refills: 0 | Status: DISCONTINUED | OUTPATIENT
Start: 2023-11-17 | End: 2023-11-17

## 2023-11-17 RX ORDER — VALPROIC ACID (AS SODIUM SALT) 250 MG/5ML
1000 SOLUTION, ORAL ORAL ONCE
Refills: 0 | Status: DISCONTINUED | OUTPATIENT
Start: 2023-11-17 | End: 2023-11-18

## 2023-11-17 RX ORDER — MAGNESIUM SULFATE 500 MG/ML
2 VIAL (ML) INJECTION ONCE
Refills: 0 | Status: COMPLETED | OUTPATIENT
Start: 2023-11-17 | End: 2023-11-17

## 2023-11-17 RX ADMIN — Medication 300 MILLIGRAM(S): at 23:42

## 2023-11-17 RX ADMIN — Medication 25 GRAM(S): at 21:15

## 2023-11-17 RX ADMIN — Medication 250 MILLIGRAM(S): at 23:47

## 2023-11-17 NOTE — ED PROVIDER NOTE - NS ED ATTENDING STATEMENT MOD
Attending with This was a shared visit with the [facility]. I reviewed and verified the documentation and independently performed the documented:

## 2023-11-17 NOTE — ED PROVIDER NOTE - CARE PROVIDER_API CALL
Marvin Casas  Neurology  [address] [place] [address], [address] [address]  Phone: [phone]  Fax: [phone]  Follow Up Time: 1-3 Days

## 2023-11-17 NOTE — ED PROVIDER NOTE - CLINICAL SUMMARY MEDICAL DECISION MAKING FREE TEXT BOX
Patient past medical history hypertension hyperlipidemia presents with 20-minute seizure witnessed by sister movements were more intense denies any falls.  Well appearing, NAD, non toxic. NCAT PERRLA EOMI neck supple non tender normal wob cta bl rrr abdomen s nt nd no rebound no guarding WWPx4 neuro non focal diffuse tremors noted no gait abnormalities  Labs CT neurology consult    Labs and EKG were ordered and reviewed by me.  Imaging was ordered and reviewed by me.  Appropriate medications for patient's presenting complaints were ordered and effects were reassessed.   Patient's records prior hospital visit, external, ED visit, and/or nursing home notes, prior EKG, prior imaging were reviewed.  Additional history was obtained from EMS. Escalation to admission/observation was considered.  Chronic conditions affecting care -seizure. However patient feels much better and is comfortable with discharge.  Appropriate follow-up was arranged.  Patient increased dosage antiseizure medication has likely breakthrough seizure patient stable for discharge

## 2023-11-17 NOTE — ED PROVIDER NOTE - PHYSICAL EXAMINATION
VITAL SIGNS: I have reviewed nursing notes and confirm.  CONSTITUTIONAL: In no acute distress.  SKIN: Skin exam is warm and dry.  HEAD: Normocephalic; atraumatic.  EYES: PERRL, EOM intact  ENT: No nasal discharge; airway clear.   NECK: Supple; non tender.  CARD: S1, S2; Regular rate and rhythm.  RESP: CTAB. Speaking in full sentences.   ABD: Soft; non-distended; non-tender.  EXT: Normal ROM. No edema.   NEURO: Alert, oriented. Diffuse tremors present. Grossly unremarkable. No focal deficits. Strength and sensation equal throughout. (-) pronator drift. (-) gait abnormalities.

## 2023-11-17 NOTE — CONSULT NOTE ADULT - ASSESSMENT
This 68y old male with epilepsy presented with breakthrough seizure. His Mg was mildly low of 1.7, and his valproate level was very low 17. His low Mg probably contributed to his low seizure threshold. The patient was recommended to do MR brain but was not done.   Currently, he is back to baseline     Recommendations:  - Increase Valproate to 1000 mg bid   - Continue phenytoin 100 mg am, and 300 mg HS  - F/u with Dr. iLm as outpatient   - Do the MR as outpatient     Thank you for sharing this patient with me; please do not hesitate to contact me in case of any question.

## 2023-11-17 NOTE — CONSULT NOTE ADULT - SUBJECTIVE AND OBJECTIVE BOX
Neurology Consult    Patient is a 68y old  Male with PMH of epilepsy since age of 20y old, DVT on Apixaban since 2022, who presents with a chief complaint of breakthrough seizure. Patient was seen and examined at the bedside. The patient states that he became dizzy/lightheaded but nothing after that point. Sister was at bedside, and she states that the patient started to act abnormally, with abnormal behaviour, scratching and moving around. That lasted for about 20 minutes, no LOC, or abnormal jerky movements. The patient during the episode was not able to answer questions. No tongue bite, or incontinence. The last seizure before this was more than two months ago, and was similar to his usual seizures with jerky movements of all the body.   He denied vision loss, diplopia, speech disturbance, or vertigo. Pt denied headache , neck pain or any recent head or neck trauma   Currently, he is at baseline.   He denied missing any dose  No recent fever, or chills  No sleep deprivation   His current medications from last clinic note on 2023:  valproate 750 mg bid   Phenytoin 100 mg am, 300 mg HS      HPI:      PAST MEDICAL & SURGICAL HISTORY:  Seizure      DVT, lower extremity  Left leg      No significant past surgical history          FAMILY HISTORY:      Social History: (-) x 3    Allergies    No Known Allergies    Intolerances        MEDICATIONS  (STANDING):    MEDICATIONS  (PRN):      Review of systems:    Constitutional: as per HPI  Eyes: No eye pain or discharge  ENMT:  No difficulty hearing; No sinus or throat pain  Neck: No pain or stiffness  Respiratory: No cough, wheezing, chills or hemoptysis  Cardiovascular: No chest pain, palpitations, shortness of breath, dyspnea on exertion  Gastrointestinal: No abdominal pain, nausea, vomiting or hematemesis; No diarrhea or constipation.   Genitourinary: No dysuria, frequency, hematuria or incontinence  Neurological: As per HPI  Skin: No rashes or lesions   Endocrine: No heat or cold intolerance; No hair loss  Musculoskeletal: No joint pain or swelling  Psychiatric: No depression, anxiety, mood swings  Heme/Lymph: No easy bruising or bleeding gums    Vital Signs Last 24 Hrs  T(C): 36.4 (2023 17:12), Max: 36.4 (2023 17:12)  T(F): 97.5 (2023 17:12), Max: 97.5 (2023 17:12)  HR: 90 (2023 17:12) (90 - 90)  BP: 127/60 (2023 17:12) (127/60 - 127/60)  BP(mean): --  RR: 18 (2023 17:12) (18 - 18)  SpO2: 99% (2023 17:12) (99% - 99%)    Parameters below as of 2023 17:12  Patient On (Oxygen Delivery Method): room air        Examination:  General:  Appearance is consistent with chronologic age.  No abnormal facies.  Gross skin survey within normal limits.    Cognitive/Language:  The patient is oriented to person, place, time and date. Language with normal repetition, comprehension and naming.  Nondysarthric.    Eyes: intact VFF.  EOMI w/o nystagmus, skew or reported double vision.  PERRL.  No ptosis/weakness of eyelid closure.    Face:  Facial sensation normal V1 - 3, no facial asymmetry.    Ears/Nose/Throat:  Hearing grossly intact b/l.  Palate elevates midline.  Tongue and uvula midline.   Motor examination:   Normal tone, bulk and range of motion.  No tenderness, twitching, tremors or involuntary movements.  Formal Muscle Strength Testing: (MRC grade R/L) 5/5 UE; 5/5 LE.  No observable drift.  Reflexes:   2+ b/l biceps, triceps, brachioradialis, patella and Achilles.  Plantar response downgoing b/l.   Sensory examination:   Intact to light touch and pinprick, pain, temperature and proprioception and vibration in all extremities.  Cerebellum:   FTN/HKS intact with normal BIJU in all limbs.  No dysmetria or dysdiadokinesia.    Gait narrow based and normal.        Labs:   CBC Full  -  ( 2023 20:33 )  WBC Count : 5.93 K/uL  RBC Count : 3.89 M/uL  Hemoglobin : 12.2 g/dL  Hematocrit : 35.6 %  Platelet Count - Automated : 148 K/uL  Mean Cell Volume : 91.5 fL  Mean Cell Hemoglobin : 31.4 pg  Mean Cell Hemoglobin Concentration : 34.3 g/dL  Auto Neutrophil # : 3.81 K/uL  Auto Lymphocyte # : 1.59 K/uL  Auto Monocyte # : 0.39 K/uL  Auto Eosinophil # : 0.08 K/uL  Auto Basophil # : 0.04 K/uL  Auto Neutrophil % : 64.3 %  Auto Lymphocyte % : 26.8 %  Auto Monocyte % : 6.6 %  Auto Eosinophil % : 1.3 %  Auto Basophil % : 0.7 %        140  |  104  |  15  ----------------------------<  85  4.7   |  28  |  0.9    Ca    9.3      2023 20:33  Mg     1.7         TPro  6.4  /  Alb  4.3  /  TBili  0.2  /  DBili  x   /  AST  21  /  ALT  27  /  AlkPhos  47      LIVER FUNCTIONS - ( 2023 20:33 )  Alb: 4.3 g/dL / Pro: 6.4 g/dL / ALK PHOS: 47 U/L / ALT: 27 U/L / AST: 21 U/L / GGT: x             Urinalysis Basic - ( 2023 21:58 )    Color: Yellow / Appearance: Clear / S.021 / pH: x  Gluc: x / Ketone: 15 mg/dL  / Bili: Negative / Urobili: 1.0 mg/dL   Blood: x / Protein: Negative mg/dL / Nitrite: Negative   Leuk Esterase: Negative / RBC: 6 /HPF / WBC 4 /HPF   Sq Epi: x / Non Sq Epi: 4 /HPF / Bacteria: Negative /HPF          Neuroimaging:  NCHCT: CT Head No Cont:   ACC: [id number] EXAM:  CT BRAIN   ORDERED BY: ANSELMO CAMPO     PROCEDURE DATE:  2023          INTERPRETATION:  CLINICAL INDICATION: Breakthrough seizure.    TECHNIQUE: CT of the head was performed without the administration of   intravenouscontrast. Coronal and sagittal reformats were obtained.    COMPARISON: CT head 2019.    FINDINGS:    Age-appropriate sulci, sylvian fissures, and ventricles.    Unchanged subdural collection along the left frontal convexity, stable   since 2019 and compatible with chronic subdural  hygroma.    No intracranial hemorrhage, mass effect, or midline shift.    No evidence of hydrocephalus.    The visualized intraorbital contents are unremarkable.    The visualized paranasal sinuses and mastoid complexes appear free of   acute disease.    No acute depressed calvarial fracture.    IMPRESSION:    Stable exam without acute intracranial pathology.    --- End of Report ---          DARNELL HERRMANN MD; Resident Radiologist  This document has been electronically signed.  JANELLE WONG MD; Attending Radiologist  This document has been electronically signed. 2023  9:37PM (23 @ 20:38)      23 @ 22:51

## 2023-11-17 NOTE — ED PROVIDER NOTE - PATIENT PORTAL LINK FT
You can access the FollowMyHealth Patient Portal offered by [facility] by registering at the following website: [email or web address]. By joining [employer]’s FollowMyHealth portal, you will also be able to view your health information using other applications (apps) compatible with our system.

## 2023-11-17 NOTE — ED ADULT TRIAGE NOTE - CHIEF COMPLAINT QUOTE
Pt BIBEMS after seizure episode. Family told EMS that he was passenger in the car when he had a seizure, 20 min- on and off. HX of seizures has not missed a dose.

## 2023-11-17 NOTE — ED PROVIDER NOTE - OBJECTIVE STATEMENT
Patient is a 68y Male PMH HTN, HLD, DVT on Eliquis, seizure disorder on Phenytoin and Divalproex who presents to the ED with complaints of Patient is a 68y Male PMH HTN, HLD, DVT on Eliquis, seizure disorder pt of Dr. Martinez on Phenytoin and Divalproex who presents to the ED with complaints of 20 minute seizure, witnessed by his sister. Sister states this seizure was different than his usual and lasted much longer, stating his movements were more intense and he was speaking non-sense. Denies fall or headstrike. Patient endorses he typically has 1-2 breakthrough seizures per week that last 2-3 minutes. Reports worsening of chronic tremor and significant weight loss over the past year despite maintaining PO intake. Denies fever, chills, chest pain, SOB, cough, recent URI, N/V/D, abd pain, or urinary sx. Patient is a 68y Male PMH HTN, HLD, DVT on Eliquis, seizure disorder pt of Dr. Martinez noncompliant with Phenytoin and Divalproex who presents to the ED with complaints of 20 minute seizure, witnessed by his sister. Sister states this seizure was different than his usual and lasted much longer, stating his movements were more intense and he was speaking non-sense. Denies fall or headstrike. Patient endorses he typically has 1-2 breakthrough seizures per week that last 2-3 minutes. Reports worsening of chronic tremor and significant weight loss over the past year despite maintaining PO intake. Denies fever, chills, chest pain, SOB, cough, recent URI, N/V/D, abd pain, or urinary sx.

## 2023-11-17 NOTE — ED PROVIDER NOTE - NSFOLLOWUPINSTRUCTIONS_ED_ALL_ED_FT
Increase Valproate to 1000 mg bid   - Continue phenytoin 100 mg am, and 300 mg at night  - F/u with Dr. Lim as outpatient       Kindly take all antiseizure medications as prescribed. It is very important that you never miss a dose to prevent you from having a breakthrough seizure. Be sure to look out for warnings or signs of seizure such as tongue biting, incontinence, shaking, eye rolling/ shaking, generalized shaking, twitching, and acute confusion or amnesia. If these things happen, please alert a healthcare professional immediately and/or come to the ER.

## 2023-11-19 NOTE — ED POST DISCHARGE NOTE - DETAILS
Patient # not working. Called guera Unger who took the info regarding CXR finding and recs and will get that info to the patient

## 2023-11-29 ENCOUNTER — APPOINTMENT (OUTPATIENT)
Dept: NEUROLOGY | Facility: CLINIC | Age: 68
End: 2023-11-29
Payer: MEDICARE

## 2023-11-29 VITALS
OXYGEN SATURATION: 97 % | WEIGHT: 141.13 LBS | HEIGHT: 73 IN | TEMPERATURE: 97.6 F | BODY MASS INDEX: 18.71 KG/M2 | DIASTOLIC BLOOD PRESSURE: 74 MMHG | HEART RATE: 88 BPM | SYSTOLIC BLOOD PRESSURE: 122 MMHG

## 2023-11-29 DIAGNOSIS — G40.909 EPILEPSY, UNSPECIFIED, NOT INTRACTABLE, W/OUT STATUS EPILEPTICUS: ICD-10-CM

## 2023-11-29 PROCEDURE — 95816 EEG AWAKE AND DROWSY: CPT

## 2023-11-29 PROCEDURE — 99213 OFFICE O/P EST LOW 20 MIN: CPT

## 2023-11-29 RX ORDER — DIVALPROEX SODIUM 250 MG/1
250 TABLET, DELAYED RELEASE ORAL
Qty: 180 | Refills: 1 | Status: DISCONTINUED | COMMUNITY
End: 2023-11-29

## 2023-11-29 RX ORDER — ERGOCALCIFEROL 1.25 MG/1
1.25 MG CAPSULE, LIQUID FILLED ORAL
Qty: 4 | Refills: 3 | Status: ACTIVE | COMMUNITY
Start: 2023-11-29 | End: 1900-01-01

## 2023-12-26 ENCOUNTER — OUTPATIENT (OUTPATIENT)
Dept: OUTPATIENT SERVICES | Facility: HOSPITAL | Age: 68
LOS: 1 days | End: 2023-12-26
Payer: MEDICARE

## 2023-12-26 ENCOUNTER — RESULT REVIEW (OUTPATIENT)
Age: 68
End: 2023-12-26

## 2023-12-26 DIAGNOSIS — G40.909 EPILEPSY, UNSPECIFIED, NOT INTRACTABLE, WITHOUT STATUS EPILEPTICUS: ICD-10-CM

## 2023-12-26 DIAGNOSIS — Z00.8 ENCOUNTER FOR OTHER GENERAL EXAMINATION: ICD-10-CM

## 2023-12-26 PROCEDURE — A9579: CPT

## 2023-12-26 PROCEDURE — 70553 MRI BRAIN STEM W/O & W/DYE: CPT

## 2023-12-26 PROCEDURE — 70553 MRI BRAIN STEM W/O & W/DYE: CPT | Mod: 26

## 2023-12-27 DIAGNOSIS — G40.909 EPILEPSY, UNSPECIFIED, NOT INTRACTABLE, WITHOUT STATUS EPILEPTICUS: ICD-10-CM

## 2024-01-12 ENCOUNTER — APPOINTMENT (OUTPATIENT)
Dept: UROLOGY | Facility: CLINIC | Age: 69
End: 2024-01-12
Payer: MEDICARE

## 2024-01-12 PROCEDURE — 99214 OFFICE O/P EST MOD 30 MIN: CPT

## 2024-01-12 PROCEDURE — 81003 URINALYSIS AUTO W/O SCOPE: CPT | Mod: QW

## 2024-01-12 NOTE — PHYSICAL EXAM
[General Appearance - Well Developed] : well developed [General Appearance - Well Nourished] : well nourished [Normal Appearance] : normal appearance [General Appearance - In No Acute Distress] : no acute distress [Oriented To Time, Place, And Person] : oriented to person, place, and time

## 2024-01-17 ENCOUNTER — NON-APPOINTMENT (OUTPATIENT)
Age: 69
End: 2024-01-17

## 2024-01-17 ENCOUNTER — EMERGENCY (EMERGENCY)
Facility: HOSPITAL | Age: 69
LOS: 0 days | Discharge: LEFT BEFORE DISCHARGE | End: 2024-01-17
Attending: EMERGENCY MEDICINE
Payer: MEDICARE

## 2024-01-17 VITALS
SYSTOLIC BLOOD PRESSURE: 100 MMHG | TEMPERATURE: 96 F | HEART RATE: 76 BPM | RESPIRATION RATE: 20 BRPM | HEIGHT: 73 IN | WEIGHT: 110.01 LBS | DIASTOLIC BLOOD PRESSURE: 61 MMHG | OXYGEN SATURATION: 100 %

## 2024-01-17 VITALS — SYSTOLIC BLOOD PRESSURE: 94 MMHG | DIASTOLIC BLOOD PRESSURE: 51 MMHG

## 2024-01-17 DIAGNOSIS — R07.89 OTHER CHEST PAIN: ICD-10-CM

## 2024-01-17 DIAGNOSIS — E78.5 HYPERLIPIDEMIA, UNSPECIFIED: ICD-10-CM

## 2024-01-17 DIAGNOSIS — I10 ESSENTIAL (PRIMARY) HYPERTENSION: ICD-10-CM

## 2024-01-17 DIAGNOSIS — Z87.891 PERSONAL HISTORY OF NICOTINE DEPENDENCE: ICD-10-CM

## 2024-01-17 DIAGNOSIS — Z53.29 PROCEDURE AND TREATMENT NOT CARRIED OUT BECAUSE OF PATIENT'S DECISION FOR OTHER REASONS: ICD-10-CM

## 2024-01-17 DIAGNOSIS — Z79.01 LONG TERM (CURRENT) USE OF ANTICOAGULANTS: ICD-10-CM

## 2024-01-17 DIAGNOSIS — G40.909 EPILEPSY, UNSPECIFIED, NOT INTRACTABLE, WITHOUT STATUS EPILEPTICUS: ICD-10-CM

## 2024-01-17 DIAGNOSIS — Z86.718 PERSONAL HISTORY OF OTHER VENOUS THROMBOSIS AND EMBOLISM: ICD-10-CM

## 2024-01-17 LAB
ALBUMIN SERPL ELPH-MCNC: 4 G/DL — SIGNIFICANT CHANGE UP (ref 3.5–5.2)
ALP SERPL-CCNC: 84 U/L — SIGNIFICANT CHANGE UP (ref 30–115)
ALT FLD-CCNC: 13 U/L — SIGNIFICANT CHANGE UP (ref 0–41)
ANION GAP SERPL CALC-SCNC: 8 MMOL/L — SIGNIFICANT CHANGE UP (ref 7–14)
AST SERPL-CCNC: 14 U/L — SIGNIFICANT CHANGE UP (ref 0–41)
BASOPHILS # BLD AUTO: 0.04 K/UL — SIGNIFICANT CHANGE UP (ref 0–0.2)
BASOPHILS NFR BLD AUTO: 0.6 % — SIGNIFICANT CHANGE UP (ref 0–1)
BILIRUB SERPL-MCNC: <0.2 MG/DL — SIGNIFICANT CHANGE UP (ref 0.2–1.2)
BILIRUB UR QL STRIP: NORMAL
BUN SERPL-MCNC: 30 MG/DL — HIGH (ref 10–20)
CALCIUM SERPL-MCNC: 8.3 MG/DL — LOW (ref 8.4–10.4)
CHLORIDE SERPL-SCNC: 105 MMOL/L — SIGNIFICANT CHANGE UP (ref 98–110)
CO2 SERPL-SCNC: 28 MMOL/L — SIGNIFICANT CHANGE UP (ref 17–32)
COLLECTION METHOD: NORMAL
CREAT SERPL-MCNC: 1 MG/DL — SIGNIFICANT CHANGE UP (ref 0.7–1.5)
EGFR: 83 ML/MIN/1.73M2 — SIGNIFICANT CHANGE UP
EOSINOPHIL # BLD AUTO: 0.3 K/UL — SIGNIFICANT CHANGE UP (ref 0–0.7)
EOSINOPHIL NFR BLD AUTO: 4.6 % — SIGNIFICANT CHANGE UP (ref 0–8)
GLUCOSE SERPL-MCNC: 88 MG/DL — SIGNIFICANT CHANGE UP (ref 70–99)
GLUCOSE UR-MCNC: NORMAL
HCG UR QL: 0.2 EU/DL
HCT VFR BLD CALC: 36.2 % — LOW (ref 42–52)
HGB BLD-MCNC: 12.2 G/DL — LOW (ref 14–18)
HGB UR QL STRIP.AUTO: NORMAL
IMM GRANULOCYTES NFR BLD AUTO: 0.3 % — SIGNIFICANT CHANGE UP (ref 0.1–0.3)
KETONES UR-MCNC: NORMAL
LEUKOCYTE ESTERASE UR QL STRIP: NORMAL
LYMPHOCYTES # BLD AUTO: 2.05 K/UL — SIGNIFICANT CHANGE UP (ref 1.2–3.4)
LYMPHOCYTES # BLD AUTO: 31.3 % — SIGNIFICANT CHANGE UP (ref 20.5–51.1)
MCHC RBC-ENTMCNC: 31.9 PG — HIGH (ref 27–31)
MCHC RBC-ENTMCNC: 33.7 G/DL — SIGNIFICANT CHANGE UP (ref 32–37)
MCV RBC AUTO: 94.5 FL — HIGH (ref 80–94)
MONOCYTES # BLD AUTO: 0.52 K/UL — SIGNIFICANT CHANGE UP (ref 0.1–0.6)
MONOCYTES NFR BLD AUTO: 8 % — SIGNIFICANT CHANGE UP (ref 1.7–9.3)
NEUTROPHILS # BLD AUTO: 3.61 K/UL — SIGNIFICANT CHANGE UP (ref 1.4–6.5)
NEUTROPHILS NFR BLD AUTO: 55.2 % — SIGNIFICANT CHANGE UP (ref 42.2–75.2)
NITRITE UR QL STRIP: NORMAL
NRBC # BLD: 0 /100 WBCS — SIGNIFICANT CHANGE UP (ref 0–0)
PH UR STRIP: 7
PLATELET # BLD AUTO: 158 K/UL — SIGNIFICANT CHANGE UP (ref 130–400)
PMV BLD: 10 FL — SIGNIFICANT CHANGE UP (ref 7.4–10.4)
POTASSIUM SERPL-MCNC: 4.5 MMOL/L — SIGNIFICANT CHANGE UP (ref 3.5–5)
POTASSIUM SERPL-SCNC: 4.5 MMOL/L — SIGNIFICANT CHANGE UP (ref 3.5–5)
PROT SERPL-MCNC: 6.2 G/DL — SIGNIFICANT CHANGE UP (ref 6–8)
PROT UR STRIP-MCNC: NORMAL
PSA SERPL-MCNC: 7.06 NG/ML
RBC # BLD: 3.83 M/UL — LOW (ref 4.7–6.1)
RBC # FLD: 14.2 % — SIGNIFICANT CHANGE UP (ref 11.5–14.5)
SODIUM SERPL-SCNC: 141 MMOL/L — SIGNIFICANT CHANGE UP (ref 135–146)
SP GR UR STRIP: 1.02
TROPONIN T, HIGH SENSITIVITY RESULT: 8 NG/L — SIGNIFICANT CHANGE UP (ref 6–21)
TROPONIN T, HIGH SENSITIVITY RESULT: 9 NG/L — SIGNIFICANT CHANGE UP (ref 6–21)
WBC # BLD: 6.54 K/UL — SIGNIFICANT CHANGE UP (ref 4.8–10.8)
WBC # FLD AUTO: 6.54 K/UL — SIGNIFICANT CHANGE UP (ref 4.8–10.8)

## 2024-01-17 PROCEDURE — 71045 X-RAY EXAM CHEST 1 VIEW: CPT

## 2024-01-17 PROCEDURE — 99285 EMERGENCY DEPT VISIT HI MDM: CPT

## 2024-01-17 PROCEDURE — 84484 ASSAY OF TROPONIN QUANT: CPT

## 2024-01-17 PROCEDURE — 76705 ECHO EXAM OF ABDOMEN: CPT

## 2024-01-17 PROCEDURE — 36415 COLL VENOUS BLD VENIPUNCTURE: CPT

## 2024-01-17 PROCEDURE — 71045 X-RAY EXAM CHEST 1 VIEW: CPT | Mod: 26

## 2024-01-17 PROCEDURE — 93005 ELECTROCARDIOGRAM TRACING: CPT

## 2024-01-17 PROCEDURE — 85025 COMPLETE CBC W/AUTO DIFF WBC: CPT

## 2024-01-17 PROCEDURE — 99285 EMERGENCY DEPT VISIT HI MDM: CPT | Mod: 25

## 2024-01-17 PROCEDURE — 93308 TTE F-UP OR LMTD: CPT

## 2024-01-17 PROCEDURE — 93010 ELECTROCARDIOGRAM REPORT: CPT

## 2024-01-17 PROCEDURE — 80053 COMPREHEN METABOLIC PANEL: CPT

## 2024-01-17 PROCEDURE — 93308 TTE F-UP OR LMTD: CPT | Mod: 26

## 2024-01-17 PROCEDURE — 76705 ECHO EXAM OF ABDOMEN: CPT | Mod: 26

## 2024-01-17 RX ORDER — SODIUM CHLORIDE 9 MG/ML
1000 INJECTION, SOLUTION INTRAVENOUS ONCE
Refills: 0 | Status: COMPLETED | OUTPATIENT
Start: 2024-01-17 | End: 2024-01-17

## 2024-01-17 RX ADMIN — SODIUM CHLORIDE 1000 MILLILITER(S): 9 INJECTION, SOLUTION INTRAVENOUS at 15:08

## 2024-01-17 NOTE — ASSESSMENT
[FreeTextEntry1] : 69 yo male pt with a history of an elevated PSA. We will have his PSA drawn today. If it remains elevated, we will proceed with an MRI. Procedure  and risks/ benefits for mRI discussed and possibleneed for biopsies depending on findings. If it has returned to baseline, he will just follow up with us in 3 months with a flowrate and PVR. All of his questions were answered, and he will follow up as scheduled. Seen with KENIA Blanton. Total time=30 min.  The submitted E/M billing level for this visit reflects the total time spent on the day of the visit including face-to-face time spent with the patient, non-face-to-face review of medical records and relevant information, documentation, and asynchronous communication with the patient after a visit via phone, email, or patients EHR portal after the visit. The medical records reviewed are either scanned into the chart or reviewed with the patient using a patients electronic medical records portal for patients with records not available to [facility] via electronic transmission platforms from other institutions and labs. Time spent counseling and performing coordination of care was also included in determining the appropriate EM billing level.   I have reviewed and verified information regarding the chief complaint and history recorded by the ancillary staff and/or the patient. I have independently reviewed and interpreted tests performed by other physicians and facilities as necessary.   I have discussed with the patient differential diagnosis, reason for auxiliary tests if ordered, risks, benefits, alternatives, and complications of each form of therapy were discussed.

## 2024-01-17 NOTE — ED PROVIDER NOTE - OBJECTIVE STATEMENT
Patient is a 68 year old male with pmhx of htn, hld, seizure d/o (on phenytoin/divalproex as per chart review), and dvt on eliquis (as per chart review) presents for evaluation of midsternal chest pain radiating to left side intermittently. He denies any fever, cough, sore throat, N/V, shortness of breath, or urinary complaints.

## 2024-01-17 NOTE — ED PROVIDER NOTE - CLINICAL SUMMARY MEDICAL DECISION MAKING FREE TEXT BOX
Patient signed out to me from Dr. Spears; 68-year-old man with PMHx as noted, in ER with c/o intermittent episodes of midsternal CP radiating to his L side.  No SOB, no LE swelling/pain.  Denies any prior cardiac workup before.  Labs reviewed.  CBC with mild anemia ( HGB 12.2, similar to prior), CMP unremarkable, high-sensitivity troponin 9 > 8 on repeat.  EKG - NSR no acute ischemic changes.  CXR negative.  Recommended for patient to stay in EDOU for cardiac monitoring, likely CCTA in a.m.  However, patient does not want to stay in ER overnight.  Feeling better, wants to go home and follow-up as outpatient.  Patient told that his workup is incomplete and that leaving could lead to death or permanent disability.  Patient understands, still wants to go.  To sign out AMA.  Patient told to follow-up with cardiology soon as possible as an outpatient and he can return to ER anytime to continue his evaluation.  Patient understands and agrees with plan.

## 2024-01-17 NOTE — ED PROVIDER NOTE - ATTENDING APP SHARED VISIT CONTRIBUTION OF CARE
68-year-old male past medical history of hypertension, hyperlipidemia, DVT on Eliquis, seizure disorder presents the emergency department for chest pain that started prior to arrival and has resolved.  No shortness of breath no palpitations no dyspnea on exertion no lower extremity swelling.  No nausea or vomiting.  Patient states he is never had this in the past.    Const: No apparent distress  Eyes: PERRL, no conjunctival injection  HENT:  Neck supple without meningismus   CV: RRR, Warm, well-perfused extremities  RESP: CTA B/L, no tachypnea   GI: soft, non-tender, non-distended  MSK: No gross deformities appreciated  Skin: Warm, dry. No rashes    will do labs, cxr, EKG

## 2024-01-17 NOTE — ED PROVIDER NOTE - PATIENT PORTAL LINK FT
You can access the FollowMyHealth Patient Portal offered by [facility] by registering at the following website: [email or web address]. By joining [employer]’s FollowMyHealth portal, you will also be able to view your health information using other applications (apps) compatible with our system.

## 2024-01-17 NOTE — HISTORY OF PRESENT ILLNESS
[FreeTextEntry1] : 67 yo male pt seen for elevated PSA, but at that time he was found to have infection, so it is unclear if he truly had an elevated PSA. He denies any further voiding symptoms. He did not obtain a new PSA level before today's appointment.

## 2024-01-17 NOTE — ED PROVIDER NOTE - CONSIDERATION OF ADMISSION OBSERVATION
Rec EDOU for further cardiac eval, however, pt elected to s/o AMA. Consideration of Admission/Observation

## 2024-01-18 ENCOUNTER — EMERGENCY (EMERGENCY)
Facility: HOSPITAL | Age: 69
LOS: 0 days | Discharge: LEFT BEFORE DISCHARGE | End: 2024-01-19
Attending: STUDENT IN AN ORGANIZED HEALTH CARE EDUCATION/TRAINING PROGRAM
Payer: MEDICARE

## 2024-01-18 VITALS
DIASTOLIC BLOOD PRESSURE: 52 MMHG | RESPIRATION RATE: 18 BRPM | WEIGHT: 110.01 LBS | SYSTOLIC BLOOD PRESSURE: 104 MMHG | HEART RATE: 69 BPM | TEMPERATURE: 98 F | HEIGHT: 73 IN | OXYGEN SATURATION: 99 %

## 2024-01-18 DIAGNOSIS — I10 ESSENTIAL (PRIMARY) HYPERTENSION: ICD-10-CM

## 2024-01-18 DIAGNOSIS — I82.409 ACUTE EMBOLISM AND THROMBOSIS OF UNSPECIFIED DEEP VEINS OF UNSPECIFIED LOWER EXTREMITY: ICD-10-CM

## 2024-01-18 DIAGNOSIS — R07.9 CHEST PAIN, UNSPECIFIED: ICD-10-CM

## 2024-01-18 DIAGNOSIS — E78.5 HYPERLIPIDEMIA, UNSPECIFIED: ICD-10-CM

## 2024-01-18 DIAGNOSIS — F17.200 NICOTINE DEPENDENCE, UNSPECIFIED, UNCOMPLICATED: ICD-10-CM

## 2024-01-18 DIAGNOSIS — Z79.01 LONG TERM (CURRENT) USE OF ANTICOAGULANTS: ICD-10-CM

## 2024-01-18 LAB
ALBUMIN SERPL ELPH-MCNC: 4 G/DL — SIGNIFICANT CHANGE UP (ref 3.5–5.2)
ALP SERPL-CCNC: 64 U/L — SIGNIFICANT CHANGE UP (ref 30–115)
ALT FLD-CCNC: 14 U/L — SIGNIFICANT CHANGE UP (ref 0–41)
ANION GAP SERPL CALC-SCNC: 10 MMOL/L — SIGNIFICANT CHANGE UP (ref 7–14)
AST SERPL-CCNC: 17 U/L — SIGNIFICANT CHANGE UP (ref 0–41)
BASOPHILS # BLD AUTO: 0.04 K/UL — SIGNIFICANT CHANGE UP (ref 0–0.2)
BASOPHILS NFR BLD AUTO: 0.5 % — SIGNIFICANT CHANGE UP (ref 0–1)
BILIRUB SERPL-MCNC: <0.2 MG/DL — SIGNIFICANT CHANGE UP (ref 0.2–1.2)
BUN SERPL-MCNC: 25 MG/DL — HIGH (ref 10–20)
CALCIUM SERPL-MCNC: 9 MG/DL — SIGNIFICANT CHANGE UP (ref 8.4–10.5)
CHLORIDE SERPL-SCNC: 106 MMOL/L — SIGNIFICANT CHANGE UP (ref 98–110)
CO2 SERPL-SCNC: 27 MMOL/L — SIGNIFICANT CHANGE UP (ref 17–32)
CREAT SERPL-MCNC: 1 MG/DL — SIGNIFICANT CHANGE UP (ref 0.7–1.5)
EGFR: 83 ML/MIN/1.73M2 — SIGNIFICANT CHANGE UP
EOSINOPHIL # BLD AUTO: 0.41 K/UL — SIGNIFICANT CHANGE UP (ref 0–0.7)
EOSINOPHIL NFR BLD AUTO: 5.1 % — SIGNIFICANT CHANGE UP (ref 0–8)
GLUCOSE SERPL-MCNC: 79 MG/DL — SIGNIFICANT CHANGE UP (ref 70–99)
HCT VFR BLD CALC: 37 % — LOW (ref 42–52)
HGB BLD-MCNC: 12.8 G/DL — LOW (ref 14–18)
IMM GRANULOCYTES NFR BLD AUTO: 0.5 % — HIGH (ref 0.1–0.3)
LYMPHOCYTES # BLD AUTO: 2.16 K/UL — SIGNIFICANT CHANGE UP (ref 1.2–3.4)
LYMPHOCYTES # BLD AUTO: 27.1 % — SIGNIFICANT CHANGE UP (ref 20.5–51.1)
MCHC RBC-ENTMCNC: 33.2 PG — HIGH (ref 27–31)
MCHC RBC-ENTMCNC: 34.6 G/DL — SIGNIFICANT CHANGE UP (ref 32–37)
MCV RBC AUTO: 95.9 FL — HIGH (ref 80–94)
MONOCYTES # BLD AUTO: 0.63 K/UL — HIGH (ref 0.1–0.6)
MONOCYTES NFR BLD AUTO: 7.9 % — SIGNIFICANT CHANGE UP (ref 1.7–9.3)
NEUTROPHILS # BLD AUTO: 4.7 K/UL — SIGNIFICANT CHANGE UP (ref 1.4–6.5)
NEUTROPHILS NFR BLD AUTO: 58.9 % — SIGNIFICANT CHANGE UP (ref 42.2–75.2)
NRBC # BLD: 0 /100 WBCS — SIGNIFICANT CHANGE UP (ref 0–0)
NT-PROBNP SERPL-SCNC: 211 PG/ML — SIGNIFICANT CHANGE UP (ref 0–300)
PLATELET # BLD AUTO: 178 K/UL — SIGNIFICANT CHANGE UP (ref 130–400)
PMV BLD: 10.1 FL — SIGNIFICANT CHANGE UP (ref 7.4–10.4)
POTASSIUM SERPL-MCNC: 5 MMOL/L — SIGNIFICANT CHANGE UP (ref 3.5–5)
POTASSIUM SERPL-SCNC: 5 MMOL/L — SIGNIFICANT CHANGE UP (ref 3.5–5)
PROT SERPL-MCNC: 6.6 G/DL — SIGNIFICANT CHANGE UP (ref 6–8)
RBC # BLD: 3.86 M/UL — LOW (ref 4.7–6.1)
RBC # FLD: 14.1 % — SIGNIFICANT CHANGE UP (ref 11.5–14.5)
SODIUM SERPL-SCNC: 143 MMOL/L — SIGNIFICANT CHANGE UP (ref 135–146)
TROPONIN T, HIGH SENSITIVITY RESULT: 6 NG/L — SIGNIFICANT CHANGE UP (ref 6–21)
WBC # BLD: 7.98 K/UL — SIGNIFICANT CHANGE UP (ref 4.8–10.8)
WBC # FLD AUTO: 7.98 K/UL — SIGNIFICANT CHANGE UP (ref 4.8–10.8)

## 2024-01-18 PROCEDURE — G0378: CPT

## 2024-01-18 PROCEDURE — 84484 ASSAY OF TROPONIN QUANT: CPT

## 2024-01-18 PROCEDURE — 93005 ELECTROCARDIOGRAM TRACING: CPT

## 2024-01-18 PROCEDURE — 36415 COLL VENOUS BLD VENIPUNCTURE: CPT

## 2024-01-18 PROCEDURE — 80053 COMPREHEN METABOLIC PANEL: CPT

## 2024-01-18 PROCEDURE — 93010 ELECTROCARDIOGRAM REPORT: CPT

## 2024-01-18 PROCEDURE — 99285 EMERGENCY DEPT VISIT HI MDM: CPT | Mod: 25

## 2024-01-18 PROCEDURE — 83880 ASSAY OF NATRIURETIC PEPTIDE: CPT

## 2024-01-18 PROCEDURE — 99223 1ST HOSP IP/OBS HIGH 75: CPT

## 2024-01-18 PROCEDURE — 71046 X-RAY EXAM CHEST 2 VIEWS: CPT | Mod: 26

## 2024-01-18 PROCEDURE — 71046 X-RAY EXAM CHEST 2 VIEWS: CPT

## 2024-01-18 PROCEDURE — 85025 COMPLETE CBC W/AUTO DIFF WBC: CPT

## 2024-01-18 NOTE — ED PROVIDER NOTE - ATTENDING APP SHARED VISIT CONTRIBUTION OF CARE
68-year-old male with PMH seizure disorder, HTN, HLD, DVT on Eliquis presents for evaluation of chest discomfort.  Patient states he has been having intermittent left-sided chest pain since this morning.  Seen in ED yesterday and had workup but left AMA prior to completion.  Patient states today he has been having ongoing intermittent symptoms.  Denies any shortness of breath, fevers, chills, cough, vomiting, diarrhea or abdominal pain.  Patient states he is taking meds as usual.    VITAL SIGNS: noted  CONSTITUTIONAL: Well-developed; Thin; in no acute distress  HEAD: Normocephalic; atraumatic  EYES: PERRL, EOM intact; conjunctiva and sclera clear  ENT: No nasal discharge; airway clear. MMM  NECK: Supple; non tender.    CARD: S1, S2 normal; no murmurs, gallops, or rubs. Regular rate and rhythm  RESP: CTAB/L, no wheezes, rales or rhonchi  ABD: Normal bowel sounds; soft; non-distended; non-tender; no CVA tenderness  EXT: Normal ROM. No calf tenderness or edema. Distal pulses intact  NEURO: Alert, oriented. Grossly unremarkable. No focal deficits  SKIN: Skin exam is warm and dry, no acute rash

## 2024-01-18 NOTE — ED PROVIDER NOTE - OBJECTIVE STATEMENT
68-year-old male with history of seizure disorder, hypertension, hyperlipidemia, DVT on Eliquis presenting to ER for evaluation of chest pain.  Patient states for 1 week has been having intermittent left-sided chest pain.  Symptoms are unaffected by exertion.  No associated shortness of breath.  Patient was seen in the ED yesterday for symptoms and had left AMA.  He denies any prior cardiac workup.  No recent illness/fever/chills/cough, recent travel, sick contacts, leg pain or swelling, history of DVT/PE, nausea/vomiting or diaphoresis.  Patient compliant with medications.

## 2024-01-18 NOTE — ED ADULT NURSE NOTE - OBJECTIVE STATEMENT
Pt is 68 yr old male BIBEMS c/o chest pain x 1 day. Pt denies any sob, Fever, N/V/D. Pt has a hx of seizures.

## 2024-01-18 NOTE — ED PROVIDER NOTE - CLINICAL SUMMARY MEDICAL DECISION MAKING FREE TEXT BOX
Patient evaluated for chest discomfort, seen in ED and worked up prior day for similar, signed out AMA after recommendation for further workup.  Patient returns for persistent intermittent chest pain, labs reviewed, x-ray without acute pathology, EKG without acute ischemia, patient placed in EDOU for further workup and evaluation of chest pain.

## 2024-01-18 NOTE — ED PROVIDER NOTE - PHYSICAL EXAMINATION
CONSTITUTIONAL: Well-appearing; well-nourished; in no apparent distress.   EYES: PERRL; EOM intact.   ENT: normal nose; no rhinorrhea; normal pharynx with no tonsillar hypertrophy.   NECK: Supple; non-tender; no cervical lymphadenopathy.   CARDIOVASCULAR: Normal S1, S2; no murmurs, rubs, or gallops. Equal radial pulses  RESPIRATORY: Normal chest excursion with respiration; breath sounds clear and equal bilaterally; no wheezes, rhonchi, or rales.  GI/: Normal bowel sounds; non-distended; non-tender; no palpable organomegaly.   MS: No evidence of trauma or deformity. Normal ROM in all four extremities; non-tender to palpation; distal pulses are normal.   SKIN: Normal for age and race; warm; dry; good turgor; no apparent lesions or exudate.   NEURO/PSYCH: A & O x 4; grossly unremarkable. mood and manner are appropriate. Grooming and personal hygiene are appropriate. No apparent thoughts of harm to self or others.

## 2024-01-18 NOTE — ED CDU PROVIDER INITIAL DAY NOTE - ATTENDING APP SHARED VISIT CONTRIBUTION OF CARE
68-year-old male with history of seizure disorder, hypertension, hyperlipidemia, DVT on Eliquis presenting to ER for evaluation of chest pain.  pt he has had 1 week of intermittent L CP. no exertional/pleuritic component. no recent illness. no syncope/trauma. no leg swelling. no hx vte.  pain mild. pt was seen recently in ED, 2 trops negative, pt left ama    vss  gen- NAD, aaox3  card-rrr  lungs-ctab, no wheezing or rhonchi  abd-sntnd, no guarding or rebound  neuro- full str/sensation, cn ii-xii grossly intact, normal coordination  LE- no calf pain/swelling/tenderness b/l

## 2024-01-19 VITALS — SYSTOLIC BLOOD PRESSURE: 109 MMHG | DIASTOLIC BLOOD PRESSURE: 54 MMHG

## 2024-01-19 LAB — TROPONIN T, HIGH SENSITIVITY RESULT: <6 NG/L — SIGNIFICANT CHANGE UP (ref 6–21)

## 2024-01-19 PROCEDURE — 99239 HOSP IP/OBS DSCHRG MGMT >30: CPT

## 2024-01-19 PROCEDURE — 93010 ELECTROCARDIOGRAM REPORT: CPT

## 2024-01-19 RX ORDER — ATORVASTATIN CALCIUM 80 MG/1
20 TABLET, FILM COATED ORAL AT BEDTIME
Refills: 0 | Status: DISCONTINUED | OUTPATIENT
Start: 2024-01-19 | End: 2024-01-19

## 2024-01-19 RX ORDER — SODIUM CHLORIDE 9 MG/ML
1000 INJECTION INTRAMUSCULAR; INTRAVENOUS; SUBCUTANEOUS ONCE
Refills: 0 | Status: COMPLETED | OUTPATIENT
Start: 2024-01-19 | End: 2024-01-19

## 2024-01-19 RX ORDER — SODIUM CHLORIDE 9 MG/ML
1000 INJECTION, SOLUTION INTRAVENOUS ONCE
Refills: 0 | Status: COMPLETED | OUTPATIENT
Start: 2024-01-19 | End: 2024-01-19

## 2024-01-19 RX ORDER — APIXABAN 2.5 MG/1
5 TABLET, FILM COATED ORAL EVERY 12 HOURS
Refills: 0 | Status: DISCONTINUED | OUTPATIENT
Start: 2024-01-19 | End: 2024-01-19

## 2024-01-19 RX ORDER — VALPROIC ACID (AS SODIUM SALT) 250 MG/5ML
1000 SOLUTION, ORAL ORAL
Refills: 0 | Status: DISCONTINUED | OUTPATIENT
Start: 2024-01-19 | End: 2024-01-19

## 2024-01-19 RX ADMIN — APIXABAN 5 MILLIGRAM(S): 2.5 TABLET, FILM COATED ORAL at 06:16

## 2024-01-19 RX ADMIN — Medication 1000 MILLIGRAM(S): at 06:16

## 2024-01-19 RX ADMIN — SODIUM CHLORIDE 1000 MILLILITER(S): 9 INJECTION INTRAMUSCULAR; INTRAVENOUS; SUBCUTANEOUS at 09:06

## 2024-01-19 RX ADMIN — SODIUM CHLORIDE 1000 MILLILITER(S): 9 INJECTION, SOLUTION INTRAVENOUS at 08:09

## 2024-01-19 RX ADMIN — SODIUM CHLORIDE 1000 MILLILITER(S): 9 INJECTION INTRAMUSCULAR; INTRAVENOUS; SUBCUTANEOUS at 00:18

## 2024-01-19 NOTE — ED CDU PROVIDER DISPOSITION NOTE - PROVIDER TOKENS
FREE:[LAST:[your PMD],PHONE:[(   )    -],FAX:[(   )    -],FOLLOWUP:[1-3 Days]],PROVIDER:[TOKEN:[[id number]],FOLLOWUP:[1-3 Days]]

## 2024-01-19 NOTE — ED CDU PROVIDER DISPOSITION NOTE - CLINICAL COURSE
serial troponins w/o significant delta, ekgs w/o acute iscahemic findings, labs stable  pt with soft BP which initially responded to fluids. pt denies acute infectious complaints. no ap, n,v,d. no trauma.  no pleuritic pain, no dizziness. no sz.  pt nsr during tele monitoring period. pt does not want to stay for further testing/w/u. risks discussed. pt w/ capacity to refuse further w/u and admission.  I and/or my team has had an extensive discussion of risks and benefits of pursuing further medical evaluation and/or care with patient and any available family/friends, including but not limited to worsening of clinical status, disability, and death; patient still electing to leave against medical advice. Patient is awake, alert, oriented and demonstrates full capacity and insight into illness. Patient aware and encouraged to return immediately to this ED or nearest ED if patient decides to change mind regarding care or if patient experiences any new, worsening, or concerning symptoms. Any available test results were discussed with patient and/or family. Verbal instructions given, patient endorsed understanding. Written discharge instructions additionally given, including follow-up plan.

## 2024-01-19 NOTE — ED CDU PROVIDER SUBSEQUENT DAY NOTE - PROGRESS NOTE DETAILS
Patient comfortable, awaiting CCTA. BP 93/58. Patient asymptomatic. Another fluid bolus ordered. Patient BP too low for CCTA/nuc stress test. He is refusing admission. Wishes to leave AMA. Aware of severe illness including death. bp transiently improving w/ fluids but pt still with soft BP. discussed need to admit pt for acs r/o and further monitoring. discussed risk of acs, syncope, trauma, missed occult infection/illness. pt does not want to stay for further w/u or monitoring. pt states he wants to go home and f/u w/ pcp. risks stressed several times as well ability to return ot ED

## 2024-01-19 NOTE — ED CDU PROVIDER DISPOSITION NOTE - CARE PROVIDERS DIRECT ADDRESSES
,DirectAddress_Unknown,[email or web address]

## 2024-01-19 NOTE — ED CDU PROVIDER DISPOSITION NOTE - NSFOLLOWUPINSTRUCTIONS_ED_ALL_ED_FT
YOU ARE LEAVING AGAINST MEDICAL ADVICE. YOU ARE RISKING SEVERE ILLNESS INCLUDING DEATH. YOU MUST FOLLOW UP WITH YOUR DOCTOR WITHIN 24 HOURS. RETURN FOR WORSENING OF SYMPTOMS OR ANY OTHER CONCERNS.     Our Emergency Department Referral Coordinators will be reaching out to you in the next 24-48 hours from 9:00am to 5:00pm with a follow up appointment. Please expect a phone call from the hospital in that time frame. If you do not receive a call or if you have any questions or concerns, you can reach them at   [phone]      Chest Pain      SEEK IMMEDIATE MEDICAL CARE IF YOU HAVE ANY OF THE FOLLOWING SYMPTOMS: worsening chest pain, coughing up blood, unexplained back/neck/jaw pain, severe abdominal pain, dizziness or lightheadedness, fainting, shortness of breath, sweaty or clammy skin, vomiting, or racing heart beat. These symptoms may represent a serious problem that is an emergency. Do not wait to see if the symptoms will go away. Get medical help right away. Call 911 and do not drive yourself to the hospital.

## 2024-01-19 NOTE — ED CDU PROVIDER SUBSEQUENT DAY NOTE - CLINICAL SUMMARY MEDICAL DECISION MAKING FREE TEXT BOX
pt doing OK overnight, BP soft, pt receiving fluids. serial troponins w/o significant delta, ekg w/o acute ischaemic findings. no cp, sob, lightheadedness,palpitations. plan for ccta

## 2024-01-19 NOTE — ED CDU PROVIDER DISPOSITION NOTE - PATIENT PORTAL LINK FT
You can access the FollowMyHealth Patient Portal offered by [facility] by registering at the following website: [email or web address]. By joining [employer]’s FollowMyHealth portal, you will also be able to view your health information using other applications (apps) compatible with our system.

## 2024-01-19 NOTE — ED CDU PROVIDER DISPOSITION NOTE - CARE PROVIDER_API CALL
your PMD,   Phone: (   )    -  Fax: (   )    -  Follow Up Time: 1-3 Days    Drea Abad  Cardiovascular Disease  [address] [place] [address], [address] [address]  Phone: [phone]  Fax: [phone]  Follow Up Time: 1-3 Days

## 2024-01-19 NOTE — ED ADULT NURSE REASSESSMENT NOTE - NS ED NURSE REASSESS COMMENT FT1
pt assessed A&ox3 pt remains on cardiac monitor pt awaiting CT , pt aware; pt given fluids as per MD orders.

## 2024-01-22 NOTE — DISCHARGE NOTE NURSING/CASE MANAGEMENT/SOCIAL WORK - NSDCPEELIQUISFU_GEN_ALL_CORE
Go for blood tests as directed. Your doctor will do lab tests at regular visits to monitor the effects of this medicine. Please follow up with your doctor and keep your health care provider appointments.
oral

## 2024-02-05 ENCOUNTER — APPOINTMENT (OUTPATIENT)
Dept: PULMONOLOGY | Facility: CLINIC | Age: 69
End: 2024-02-05

## 2024-02-10 ENCOUNTER — RESULT REVIEW (OUTPATIENT)
Age: 69
End: 2024-02-10

## 2024-02-10 ENCOUNTER — OUTPATIENT (OUTPATIENT)
Dept: OUTPATIENT SERVICES | Facility: HOSPITAL | Age: 69
LOS: 1 days | End: 2024-02-10
Payer: MEDICARE

## 2024-02-10 DIAGNOSIS — R97.20 ELEVATED PROSTATE SPECIFIC ANTIGEN [PSA]: ICD-10-CM

## 2024-02-10 PROCEDURE — A9579: CPT

## 2024-02-10 PROCEDURE — 72197 MRI PELVIS W/O & W/DYE: CPT | Mod: 26

## 2024-02-10 PROCEDURE — 72197 MRI PELVIS W/O & W/DYE: CPT

## 2024-02-11 DIAGNOSIS — R97.20 ELEVATED PROSTATE SPECIFIC ANTIGEN [PSA]: ICD-10-CM

## 2024-02-16 ENCOUNTER — RX RENEWAL (OUTPATIENT)
Age: 69
End: 2024-02-16

## 2024-02-20 ENCOUNTER — RX RENEWAL (OUTPATIENT)
Age: 69
End: 2024-02-20

## 2024-02-29 ENCOUNTER — APPOINTMENT (OUTPATIENT)
Dept: NEUROLOGY | Facility: CLINIC | Age: 69
End: 2024-02-29
Payer: MEDICARE

## 2024-02-29 VITALS
TEMPERATURE: 97.8 F | BODY MASS INDEX: 19.09 KG/M2 | WEIGHT: 144 LBS | SYSTOLIC BLOOD PRESSURE: 122 MMHG | HEIGHT: 73 IN | DIASTOLIC BLOOD PRESSURE: 70 MMHG | OXYGEN SATURATION: 98 % | HEART RATE: 65 BPM

## 2024-02-29 PROCEDURE — G2211 COMPLEX E/M VISIT ADD ON: CPT

## 2024-02-29 PROCEDURE — 99213 OFFICE O/P EST LOW 20 MIN: CPT

## 2024-03-03 NOTE — ASSESSMENT
[FreeTextEntry1] : 1- seizure D/O not well characterized 2-R/O mood D/O 3- DLD 4- DVT   plan admission to the EMU for further characterization Consider starting SSRI F/U

## 2024-03-03 NOTE — HISTORY OF PRESENT ILLNESS
[FreeTextEntry1] : Mike is here for the F/U accompanied by his sister He is quiet and in the beginning only answers in one word gradually makes better eye contact and interaction He does have many ED visits often related to different things last visit was in Jan for chest Pain and he was told that it was the hiatal hernia giving him the symptoms  He is also reportedly having periods of decreased interaction with the sister and not doing that much.. On my questioning he says it is because of the SZ There is really no discrete and well defined event that could be called seizure On the other hand his prior V-EEG an R-EEG all were normal He did have an MRI of the brain done in Dec during one of his ED visits that was read as un remarkable His last blood test in the ED in Jan is also normal

## 2024-03-03 NOTE — PHYSICAL EXAM
[FreeTextEntry1] : A/A/Ox3 quiet  gradually warms up Good attention slight concrete thinking normal CN no tremor no drift no dysmetria stable gait

## 2024-03-04 LAB
MAGNESIUM SERPL-MCNC: 2.1 MG/DL
PHENYTOIN SERPL QL: 14.5 UG/ML
VALPROATE SERPL-MCNC: 45 UG/ML

## 2024-03-15 ENCOUNTER — APPOINTMENT (OUTPATIENT)
Dept: UROLOGY | Facility: CLINIC | Age: 69
End: 2024-03-15

## 2024-04-26 ENCOUNTER — APPOINTMENT (OUTPATIENT)
Dept: UROLOGY | Facility: CLINIC | Age: 69
End: 2024-04-26
Payer: MEDICARE

## 2024-04-26 VITALS
HEIGHT: 73 IN | SYSTOLIC BLOOD PRESSURE: 94 MMHG | BODY MASS INDEX: 19.09 KG/M2 | TEMPERATURE: 97.4 F | HEART RATE: 68 BPM | WEIGHT: 144 LBS | DIASTOLIC BLOOD PRESSURE: 57 MMHG

## 2024-04-26 DIAGNOSIS — R97.20 ELEVATED PROSTATE, SPECIFIC ANTIGEN [PSA]: ICD-10-CM

## 2024-04-26 DIAGNOSIS — I82.409 ACUTE EMBOLISM AND THROMBOSIS OF UNSPECIFIED DEEP VEINS OF UNSPECIFIED LOWER EXTREMITY: ICD-10-CM

## 2024-04-26 PROCEDURE — 99214 OFFICE O/P EST MOD 30 MIN: CPT

## 2024-04-26 PROCEDURE — G2211 COMPLEX E/M VISIT ADD ON: CPT

## 2024-04-26 PROCEDURE — 81003 URINALYSIS AUTO W/O SCOPE: CPT | Mod: QW

## 2024-04-29 ENCOUNTER — NON-APPOINTMENT (OUTPATIENT)
Age: 69
End: 2024-04-29

## 2024-04-29 ENCOUNTER — OUTPATIENT (OUTPATIENT)
Dept: OUTPATIENT SERVICES | Facility: HOSPITAL | Age: 69
LOS: 1 days | End: 2024-04-29
Payer: MEDICARE

## 2024-04-29 ENCOUNTER — RESULT REVIEW (OUTPATIENT)
Age: 69
End: 2024-04-29

## 2024-04-29 DIAGNOSIS — N42.89 OTHER SPECIFIED DISORDERS OF PROSTATE: ICD-10-CM

## 2024-04-29 PROCEDURE — 76377 3D RENDER W/INTRP POSTPROCES: CPT | Mod: 26

## 2024-04-29 PROCEDURE — 76377 3D RENDER W/INTRP POSTPROCES: CPT

## 2024-04-30 DIAGNOSIS — N42.89 OTHER SPECIFIED DISORDERS OF PROSTATE: ICD-10-CM

## 2024-05-06 PROBLEM — R97.20 ELEVATED PSA: Status: ACTIVE | Noted: 2023-10-03

## 2024-05-06 PROBLEM — I82.409 DVT (DEEP VENOUS THROMBOSIS): Status: ACTIVE | Noted: 2022-04-11

## 2024-05-06 LAB
BILIRUB UR QL STRIP: NEGATIVE
COLLECTION METHOD: NORMAL
GLUCOSE UR-MCNC: NEGATIVE
HCG UR QL: 0.2 EU/DL
HGB UR QL STRIP.AUTO: NORMAL
KETONES UR-MCNC: NEGATIVE
LEUKOCYTE ESTERASE UR QL STRIP: NEGATIVE
NITRITE UR QL STRIP: NEGATIVE
PH UR STRIP: 7
PROT UR STRIP-MCNC: NEGATIVE
SP GR UR STRIP: 1.02

## 2024-05-06 NOTE — HISTORY OF PRESENT ILLNESS
[FreeTextEntry1] :  68 year old male patient seen in follow-up for elevated PSA of 7.06 in January 2024. Pt comes in today to discuss MRI results; findings are listed below. He would like to discuss options today. Pt does have a previous history of DVT and is on Eliquis at present and he will need to be able to come off the Eliquis to be considered for the biopsy. We will ask for a medical clearance. Otherwise, no changes in voiding symptoms.   Pt's urinalysis from today was essentially clear.   MRI Prostate done on 2/16/24:  1 PIRADS 4 lesion 9 x 5 x 6mm.  Prostate volume: 41 mL

## 2024-05-06 NOTE — ASSESSMENT
[FreeTextEntry1] :  68 year old male patient with elevated PSA. I will ask Dr Rosalio to consider him for biopsies as long as he can come off the Eliquis. They understand the benefits, risks, and expected outcomes.  All of the patient's questions were otherwise answered. Total time=35 min.   The submitted E/M billing level for this visit reflects the total time spent on the day of the visit including face-to-face time spent with the patient, non-face-to-face review of medical records and relevant information, documentation, and asynchronous communication with the patient after a visit via phone, email, or patients EHR portal after the visit. The medical records reviewed are either scanned into the chart or reviewed with the patient using a patients electronic medical records portal for patients with records not available to [facility] via electronic transmission platforms from other institutions and labs. Time spent counseling and performing coordination of care was also included in determining the appropriate EM billing level.   I have reviewed and verified information regarding the chief complaint and history recorded by the ancillary staff and/or the patient. I have independently reviewed and interpreted tests performed by other physicians and facilities as necessary.   I have discussed with the patient differential diagnosis, reason for auxiliary tests if ordered, risks, benefits, alternatives, and complications of each form of therapy were discussed.  I personally performed the services described in the documentation, reviewed the documentation recorded by the scribe in my presence, and it accurately and completely records my words and actions.

## 2024-05-06 NOTE — ADDENDUM
[FreeTextEntry1] :  ROSALVA NASH, am scribing for and in the presence of  in the following sections: HISTORY OF PRESENT ILLNESS, PAST MEDICAL/FAMILY/SOCIAL HISTORY, REVIEW OF SYSTEMS, VITAL SIGNS, PHYSICAL EXAM, ASSESSMENT/PLAN on 04/26/2024.

## 2024-05-28 RX ORDER — DIVALPROEX SODIUM 250 MG/1
250 TABLET, DELAYED RELEASE ORAL
Qty: 240 | Refills: 1 | Status: ACTIVE | COMMUNITY
Start: 1900-01-01 | End: 1900-01-01

## 2024-05-28 RX ORDER — VALPROIC ACID 250 MG/1
250 CAPSULE, LIQUID FILLED ORAL
Qty: 240 | Refills: 0 | Status: DISCONTINUED | COMMUNITY
Start: 2024-02-16 | End: 2024-05-28

## 2024-06-02 ENCOUNTER — EMERGENCY (EMERGENCY)
Facility: HOSPITAL | Age: 69
LOS: 0 days | Discharge: HOME | End: 2024-06-02
Attending: EMERGENCY MEDICINE
Payer: MEDICARE

## 2024-06-02 VITALS
TEMPERATURE: 98 F | DIASTOLIC BLOOD PRESSURE: 66 MMHG | OXYGEN SATURATION: 100 % | SYSTOLIC BLOOD PRESSURE: 110 MMHG | HEART RATE: 64 BPM | RESPIRATION RATE: 18 BRPM

## 2024-06-02 DIAGNOSIS — M25.562 PAIN IN LEFT KNEE: ICD-10-CM

## 2024-06-02 DIAGNOSIS — M54.50 LOW BACK PAIN, UNSPECIFIED: ICD-10-CM

## 2024-06-02 DIAGNOSIS — Z79.01 LONG TERM (CURRENT) USE OF ANTICOAGULANTS: ICD-10-CM

## 2024-06-02 DIAGNOSIS — M25.552 PAIN IN LEFT HIP: ICD-10-CM
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
## 2024-06-02 DIAGNOSIS — E78.5 HYPERLIPIDEMIA, UNSPECIFIED: ICD-10-CM

## 2024-06-02 DIAGNOSIS — I10 ESSENTIAL (PRIMARY) HYPERTENSION: ICD-10-CM
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
## 2024-06-02 DIAGNOSIS — Z86.718 PERSONAL HISTORY OF OTHER VENOUS THROMBOSIS AND EMBOLISM: ICD-10-CM

## 2024-06-02 PROCEDURE — 73564 X-RAY EXAM KNEE 4 OR MORE: CPT | Mod: 26,LT

## 2024-06-02 PROCEDURE — 72170 X-RAY EXAM OF PELVIS: CPT | Mod: 26

## 2024-06-02 PROCEDURE — 99284 EMERGENCY DEPT VISIT MOD MDM: CPT | Mod: 25

## 2024-06-02 PROCEDURE — 99284 EMERGENCY DEPT VISIT MOD MDM: CPT

## 2024-06-02 PROCEDURE — 73564 X-RAY EXAM KNEE 4 OR MORE: CPT | Mod: LT

## 2024-06-02 PROCEDURE — 72170 X-RAY EXAM OF PELVIS: CPT

## 2024-06-02 RX ORDER — ACETAMINOPHEN 500 MG
975 TABLET ORAL ONCE
Refills: 0 | Status: COMPLETED | OUTPATIENT
Start: 2024-06-02 | End: 2024-06-02

## 2024-06-02 RX ORDER — METHOCARBAMOL 500 MG/1
1000 TABLET, FILM COATED ORAL ONCE
Refills: 0 | Status: COMPLETED | OUTPATIENT
Start: 2024-06-02 | End: 2024-06-02

## 2024-06-02 RX ADMIN — Medication 975 MILLIGRAM(S): at 17:09

## 2024-06-02 RX ADMIN — METHOCARBAMOL 1000 MILLIGRAM(S): 500 TABLET, FILM COATED ORAL at 17:09

## 2024-06-02 NOTE — ED PROVIDER NOTE - PHYSICAL EXAMINATION
CONSTITUTIONAL: Well-appearing; well-nourished; in no apparent distress.   EYES: PERRL; EOM intact.   ENT: normal nose; no rhinorrhea; normal pharynx with no tonsillar hypertrophy.   NECK: Supple; non-tender; no cervical lymphadenopathy.  CARDIOVASCULAR: Normal S1, S2; no murmurs, rubs, or gallops.   RESPIRATORY: Normal chest excursion with respiration; breath sounds clear and equal bilaterally; no wheezes, rhonchi, or rales.  GI/: Normal bowel sounds; non-distended; non-tender; no palpable organomegaly.   MS: No evidence of trauma or deformity. No midline spinal ttp. Stable pelvis. Normal ROM in all four extremities; non-tender to palpation; distal pulses are normal.   SKIN: Normal for age and race; warm; dry; good turgor; no apparent lesions or exudate.   NEURO/PSYCH: A & O x 4; grossly unremarkable. mood and manner are appropriate. No focal deficits. neurovascular intact. Sensation intact. Strength equal b/l 5/5 throughout

## 2024-06-02 NOTE — ED PROVIDER NOTE - PATIENT PORTAL LINK FT
You can access the FollowMyHealth Patient Portal offered by [facility] by registering at the following website: [email or web address]. By joining [facility]’s FollowMyHealth portal, you will also be able to view your health information using other applications (apps) compatible with our system.

## 2024-06-02 NOTE — ED PROVIDER NOTE - CLINICAL SUMMARY MEDICAL DECISION MAKING FREE TEXT BOX
69yM HTN DLD seizure DVT on eliquis p/w L knee pain and lower back pain after fall yesterday w/o head injury or LOC.  Pt ambulating and w/ intact ROM in b/l LE.  Imaging w/o acute pathology.  Despite eliquis use, no sig concern for intraabd traumatic injury.  Recommend supportive care, o/p f/u, return precautions.

## 2024-06-02 NOTE — ED PROVIDER NOTE - CARE PROVIDER_API CALL
Raj Guthrie  Orthopaedic Surgery  [address] [place] [address]  Phone: [phone]  Fax: [phone]  Follow Up Time:

## 2024-06-02 NOTE — ED PROVIDER NOTE - NSFOLLOWUPINSTRUCTIONS_ED_ALL_ED_FT
Fall Prevention in the Home, Adult  Falls can cause injuries and can affect people from all age groups. There are many simple things that you can do to make your home safe and to help prevent falls. Ask for help when making these changes, if needed.    What actions can I take to prevent falls?  General instructions     Use good lighting in all rooms. Replace any light bulbs that burn out.  Turn on lights if it is dark. Use night-lights.  Place frequently used items in easy-to-reach places. Lower the shelves around your home if necessary.  Set up furniture so that there are clear paths around it. Avoid moving your furniture around.  Remove throw rugs and other tripping hazards from the floor.  Avoid walking on wet floors.  Fix any uneven floor surfaces.  Add color or contrast paint or tape to grab bars and handrails in your home. Place contrasting color strips on the first and last steps of stairways.  When you use a stepladder, make sure that it is completely opened and that the sides are firmly locked. Have someone hold the ladder while you are using it. Do not climb a closed stepladder.  Be aware of any and all pets.  What can I do in the bathroom?     Keep the floor dry. Immediately clean up any water that spills onto the floor.  Remove soap buildup in the tub or shower on a regular basis.  Use non-skid mats or decals on the floor of the tub or shower.  Attach bath mats securely with double-sided, non-slip rug tape.  If you need to sit down while you are in the shower, use a plastic, non-slip stool.  Image ImageInstall grab bars by the toilet and in the tub and shower. Do not use towel bars as grab bars.  What can I do in the bedroom?     Make sure that a bedside light is easy to reach.  Do not use oversized bedding that drapes onto the floor.  Have a firm chair that has side arms to use for getting dressed.  What can I do in the kitchen?     Clean up any spills right away.  If you need to reach for something above you, use a sturdy step stool that has a grab bar.  Keep electrical cables out of the way.  Do not use floor polish or wax that makes floors slippery. If you must use wax, make sure that it is non-skid floor wax.  What can I do in the stairways?     Do not leave any items on the stairs.  Make sure that you have a light switch at the top of the stairs and the bottom of the stairs. Have them installed if you do not have them.  Make sure that there are handrails on both sides of the stairs. Fix handrails that are broken or loose. Make sure that handrails are as long as the stairways.  Install non-slip stair treads on all stairs in your home.  Avoid having throw rugs at the top or bottom of stairways, or secure the rugs with carpet tape to prevent them from moving.  Choose a carpet design that does not hide the edge of steps on the stairway.  Check any carpeting to make sure that it is firmly attached to the stairs. Fix any carpet that is loose or worn.  What can I do on the outside of my home?     Use bright outdoor lighting.  Regularly repair the edges of walkways and driveways and fix any cracks.  Remove high doorway thresholds.  Trim any shrubbery on the main path into your home.  Regularly check that handrails are securely fastened and in good repair. Both sides of any steps should have handrails.  Install guardrails along the edges of any raised decks or porches.  Clear walkways of debris and clutter, including tools and rocks.  Have leaves, snow, and ice cleared regularly.  Use sand or salt on walkways during winter months.  In the garage, clean up any spills right away, including grease or oil spills.  What other actions can I take?     Wear closed-toe shoes that fit well and support your feet. Wear shoes that have rubber soles or low heels.  Use mobility aids as needed, such as canes, walkers, scooters, and crutches.  Review your medicines with your health care provider. Some medicines can cause dizziness or changes in blood pressure, which increase your risk of falling.  Talk with your health care provider about other ways that you can decrease your risk of falls. This may include working with a physical therapist or  to improve your strength, balance, and endurance.    Where to find more information  Centers for Disease Control and Prevention, [place]: https://www.cdc.gov  National [place] on Aging: https://zq8wlsf.nolvia.nih.gov  Contact a health care provider if:  You are afraid of falling at home.  You feel weak, drowsy, or dizzy at home.  You fall at home.  Summary  There are many simple things that you can do to make your home safe and to help prevent falls.  Ways to make your home safe include removing tripping hazards and installing grab bars in the bathroom.  Ask for help when making these changes in your home.  This information is not intended to replace advice given to you by your health care provider. Make sure you discuss any questions you have with your health care provider.    KNEE PAIN - Ambulatory Care     Knee Pain    AMBULATORY CARE:    Knee pain may start suddenly, or it may be a long-term problem. You may have pain on the side, front, or back of your knee. You may have knee stiffness and swelling. You may hear popping sounds or feel like your knee is giving way or locking up as you walk. You may feel pain when you sit, stand, walk, or climb up and down stairs. Knee pain can be caused by conditions such as obesity, inflammation, or strains or tears in ligaments or tendons.    Seek care immediately if:     Your pain is worse, even after treatment.       You cannot bend or straighten your leg completely.       The swelling around your knee does not go down even with treatment.      Your knee is painful and hot to the touch.     Contact your healthcare provider if:     You have questions or concerns about your condition or care.         Treatment will depend on the cause of your pain. You may need any of the following:     NSAIDs help decrease swelling and pain or fever. This medicine is available with or without a doctor's order. NSAIDs can cause stomach bleeding or kidney problems in certain people. If you take blood thinner medicine, always ask your healthcare provider if NSAIDs are safe for you. Always read the medicine label and follow directions.      Acetaminophen decreases pain and fever. It is available without a doctor's order. Ask how much to take and how often to take it. Follow directions. Read the labels of all other medicines you are using to see if they also contain acetaminophen, or ask your doctor or pharmacist. Acetaminophen can cause liver damage if not taken correctly. Do not use more than 4 grams (4,000 milligrams) total of acetaminophen in one day.       Prescription pain medicine may be given. Ask your healthcare provider how to take this medicine safely. Some prescription pain medicines contain acetaminophen. Do not take other medicines that contain acetaminophen without talking to your healthcare provider. Too much acetaminophen may cause liver damage. Prescription pain medicine may cause constipation. Ask your healthcare provider how to prevent or treat constipation.       Steroid injections may be given into your knee. Steroids reduce inflammation and pain.      Surgery may be used for some injuries, such as to repair a torn ACL.    What you can do to manage your symptoms:     Rest your knee so it can heal. Limit activities that increase your pain. Do low-impact exercises, such as walking or swimming.       Apply ice to help reduce swelling and pain. Use an ice pack, or put crushed ice in a plastic bag. Cover it with a towel before you apply it to your knee. Apply ice for 15 to 20 minutes every hour, or as directed.      Apply compression to help reduce swelling. Use a brace or bandage only as directed.      Elevate your knee to help decrease pain and swelling. Elevate your knee while you are sitting or lying down. Prop your leg on pillows to keep your knee above the level of your heart.      Prevent your knee from moving as directed. Your healthcare provider may put on a cast or splint. You may need to wear a leg brace to stabilize your knee. A leg brace can be adjusted to increase your range of motion as your knee heals.Hinged Knee Braces          What you can do to prevent knee pain:     Maintain a healthy weight. Extra weight increases your risk for knee pain. Ask your healthcare provider how much you should weigh. He or she can help you create a safe weight loss plan if you need to lose weight.      Exercise or train properly. Use the correct equipment for sports. Wear shoes that provide good support. Check your posture often as you exercise, play sports, or train for an event. This can help prevent stress and strain on your knees. Rest between sessions so you do not overwork your knees.    Follow up with your healthcare provider within 24 hours or as directed: Write down your questions so you remember to ask them during your visits.

## 2024-06-02 NOTE — ED PROVIDER NOTE - OBJECTIVE STATEMENT
69 year old M with hx of seizure, htn, hld, dvr on eliquis presenting to er for eval of L knee pain and L sided back pain s/p fall. Pt has mech trip and fall yesterday morning and hit L knee against coffee table. Pts sister was there to help him up. No head trauma. Pt now c/o L sided knee pain and L lower back pain s/p fall. Pt has been able to ambulate with pain. Denies any akin changes/bruising, numbness, bowel or bladder incontinence, saddle anesthesia, and pain, nausea, vomiting, weakness, fever/chills.

## 2024-06-02 NOTE — ED PROVIDER NOTE - ATTENDING APP SHARED VISIT CONTRIBUTION OF CARE
69yM p/w L low back/hip pain and L knee pain after fall yesterday.  No head injury or LOC.  Not on a/c.  Has been struggling to ambulate 2/2 pain.

## 2024-06-03 ENCOUNTER — RX RENEWAL (OUTPATIENT)
Age: 69
End: 2024-06-03

## 2024-06-03 ENCOUNTER — OUTPATIENT (OUTPATIENT)
Dept: OUTPATIENT SERVICES | Facility: HOSPITAL | Age: 69
LOS: 1 days | End: 2024-06-03
Payer: MEDICARE

## 2024-06-03 VITALS
SYSTOLIC BLOOD PRESSURE: 104 MMHG | RESPIRATION RATE: 16 BRPM | OXYGEN SATURATION: 97 % | HEIGHT: 73 IN | HEART RATE: 77 BPM | TEMPERATURE: 97 F | DIASTOLIC BLOOD PRESSURE: 89 MMHG | WEIGHT: 156.97 LBS

## 2024-06-03 DIAGNOSIS — Z01.818 ENCOUNTER FOR OTHER PREPROCEDURAL EXAMINATION: ICD-10-CM

## 2024-06-03 DIAGNOSIS — R97.20 ELEVATED PROSTATE SPECIFIC ANTIGEN [PSA]: ICD-10-CM

## 2024-06-03 LAB
ALBUMIN SERPL ELPH-MCNC: 4.2 G/DL — SIGNIFICANT CHANGE UP (ref 3.5–5.2)
ALP SERPL-CCNC: 85 U/L — SIGNIFICANT CHANGE UP (ref 30–115)
ALT FLD-CCNC: 30 U/L — SIGNIFICANT CHANGE UP (ref 0–41)
ANION GAP SERPL CALC-SCNC: 13 MMOL/L — SIGNIFICANT CHANGE UP (ref 7–14)
APPEARANCE UR: CLEAR — SIGNIFICANT CHANGE UP
AST SERPL-CCNC: 27 U/L — SIGNIFICANT CHANGE UP (ref 0–41)
BASOPHILS # BLD AUTO: 0.04 K/UL — SIGNIFICANT CHANGE UP (ref 0–0.2)
BASOPHILS NFR BLD AUTO: 0.6 % — SIGNIFICANT CHANGE UP (ref 0–1)
BILIRUB SERPL-MCNC: <0.2 MG/DL — SIGNIFICANT CHANGE UP (ref 0.2–1.2)
BILIRUB UR-MCNC: NEGATIVE — SIGNIFICANT CHANGE UP
BUN SERPL-MCNC: 22 MG/DL — HIGH (ref 10–20)
CALCIUM SERPL-MCNC: 8.6 MG/DL — SIGNIFICANT CHANGE UP (ref 8.4–10.5)
CHLORIDE SERPL-SCNC: 104 MMOL/L — SIGNIFICANT CHANGE UP (ref 98–110)
CO2 SERPL-SCNC: 24 MMOL/L — SIGNIFICANT CHANGE UP (ref 17–32)
COLOR SPEC: YELLOW — SIGNIFICANT CHANGE UP
CREAT SERPL-MCNC: 1.1 MG/DL — SIGNIFICANT CHANGE UP (ref 0.7–1.5)
DIFF PNL FLD: NEGATIVE — SIGNIFICANT CHANGE UP
EGFR: 74 ML/MIN/1.73M2 — SIGNIFICANT CHANGE UP
EOSINOPHIL # BLD AUTO: 0.52 K/UL — SIGNIFICANT CHANGE UP (ref 0–0.7)
EOSINOPHIL NFR BLD AUTO: 7.6 % — SIGNIFICANT CHANGE UP (ref 0–8)
GLUCOSE SERPL-MCNC: 74 MG/DL — SIGNIFICANT CHANGE UP (ref 70–99)
GLUCOSE UR QL: NEGATIVE MG/DL — SIGNIFICANT CHANGE UP
HCT VFR BLD CALC: 39.2 % — LOW (ref 42–52)
HGB BLD-MCNC: 12.9 G/DL — LOW (ref 14–18)
IMM GRANULOCYTES NFR BLD AUTO: 0.4 % — HIGH (ref 0.1–0.3)
KETONES UR-MCNC: NEGATIVE MG/DL — SIGNIFICANT CHANGE UP
LEUKOCYTE ESTERASE UR-ACNC: NEGATIVE — SIGNIFICANT CHANGE UP
LYMPHOCYTES # BLD AUTO: 2.72 K/UL — SIGNIFICANT CHANGE UP (ref 1.2–3.4)
LYMPHOCYTES # BLD AUTO: 39.5 % — SIGNIFICANT CHANGE UP (ref 20.5–51.1)
MCHC RBC-ENTMCNC: 30.6 PG — SIGNIFICANT CHANGE UP (ref 27–31)
MCHC RBC-ENTMCNC: 32.9 G/DL — SIGNIFICANT CHANGE UP (ref 32–37)
MCV RBC AUTO: 93.1 FL — SIGNIFICANT CHANGE UP (ref 80–94)
MONOCYTES # BLD AUTO: 0.56 K/UL — SIGNIFICANT CHANGE UP (ref 0.1–0.6)
MONOCYTES NFR BLD AUTO: 8.1 % — SIGNIFICANT CHANGE UP (ref 1.7–9.3)
NEUTROPHILS # BLD AUTO: 3.01 K/UL — SIGNIFICANT CHANGE UP (ref 1.4–6.5)
NEUTROPHILS NFR BLD AUTO: 43.8 % — SIGNIFICANT CHANGE UP (ref 42.2–75.2)
NITRITE UR-MCNC: NEGATIVE — SIGNIFICANT CHANGE UP
NRBC # BLD: 0 /100 WBCS — SIGNIFICANT CHANGE UP (ref 0–0)
PH UR: 6 — SIGNIFICANT CHANGE UP (ref 5–8)
PLATELET # BLD AUTO: 178 K/UL — SIGNIFICANT CHANGE UP (ref 130–400)
PMV BLD: 10.2 FL — SIGNIFICANT CHANGE UP (ref 7.4–10.4)
POTASSIUM SERPL-MCNC: 4.6 MMOL/L — SIGNIFICANT CHANGE UP (ref 3.5–5)
POTASSIUM SERPL-SCNC: 4.6 MMOL/L — SIGNIFICANT CHANGE UP (ref 3.5–5)
PROT SERPL-MCNC: 6.7 G/DL — SIGNIFICANT CHANGE UP (ref 6–8)
PROT UR-MCNC: NEGATIVE MG/DL — SIGNIFICANT CHANGE UP
RBC # BLD: 4.21 M/UL — LOW (ref 4.7–6.1)
RBC # FLD: 14.5 % — SIGNIFICANT CHANGE UP (ref 11.5–14.5)
SODIUM SERPL-SCNC: 141 MMOL/L — SIGNIFICANT CHANGE UP (ref 135–146)
SP GR SPEC: 1.02 — SIGNIFICANT CHANGE UP (ref 1–1.03)
UROBILINOGEN FLD QL: 1 MG/DL — SIGNIFICANT CHANGE UP (ref 0.2–1)
WBC # BLD: 6.88 K/UL — SIGNIFICANT CHANGE UP (ref 4.8–10.8)
WBC # FLD AUTO: 6.88 K/UL — SIGNIFICANT CHANGE UP (ref 4.8–10.8)

## 2024-06-03 PROCEDURE — 99214 OFFICE O/P EST MOD 30 MIN: CPT | Mod: 25

## 2024-06-03 PROCEDURE — 87086 URINE CULTURE/COLONY COUNT: CPT

## 2024-06-03 PROCEDURE — 81003 URINALYSIS AUTO W/O SCOPE: CPT

## 2024-06-03 PROCEDURE — 85025 COMPLETE CBC W/AUTO DIFF WBC: CPT

## 2024-06-03 PROCEDURE — 80053 COMPREHEN METABOLIC PANEL: CPT

## 2024-06-03 PROCEDURE — 93010 ELECTROCARDIOGRAM REPORT: CPT

## 2024-06-03 PROCEDURE — 93005 ELECTROCARDIOGRAM TRACING: CPT

## 2024-06-03 PROCEDURE — 36415 COLL VENOUS BLD VENIPUNCTURE: CPT

## 2024-06-03 RX ORDER — APIXABAN 2.5 MG/1
1 TABLET, FILM COATED ORAL
Refills: 0 | DISCHARGE

## 2024-06-03 NOTE — H&P PST ADULT - REASON FOR ADMISSION
Case Type: OP Block TimeSuite: OR SouthProceduralist: Eber Santamaria  Confirmed Surgery DateTime: 06- - 0:00PAST DateTime: 06- - 10:15Procedure: TRANSPERINEAL FUSION PROSTATE BIOPSY  ERP?: NoLaterality: N/ALength of Procedure: 30 Minutes  Anesthesia Type: General

## 2024-06-03 NOTE — H&P PST ADULT - HISTORY OF PRESENT ILLNESS
68 yo male PAST MEDICAL & SURGICAL HISTORY: Seizure-last activity years ago , DVT, lower extremity-Left left, Fall ( 6/2/24) presents for PAST in preparation for  TRANSPERINEAL FUSION PROSTATE BIOPSY.   s/p fall yesterday ( 6/2/24) trip on a table in the house, [place] and found by his sister   PATIENT/GUARDIAN CURRENTLY DENIES CHEST PAIN  SHORTNESS OF BREATH  PALPITATIONS,  DYSURIA, OR UPPER RESPIRATORY INFECTION IN PAST 2 WEEKS    Anesthesia Alert  NO--Difficult Airway  NO--History of neck surgery or radiation  NO--Limited ROM of neck  NO--History of Malignant hyperthermia  NO--No personal or family history of Pseudocholinesterase deficiency.  NO--Prior Anesthesia Complication  NO--Latex Allergy  NO--Loose teeth  NO--History of Rheumatoid Arthritis  NO--Bleeding risk  NO--ELVIRA  NO--Other    PT/GUARDIAN DENIES ANY RASHES, ABRASION, OR OPEN WOUNDS OR CUTS    AS PER THE PT/GUARDIAN, THIS IS HIS/HER COMPLETE MEDICAL AND SURGICAL HX, INCLUDING MEDICATIONS PRESCRIBED AND OVER THE COUNTER  Patient/guardian understands the instructions and was given the opportunity to ask questions and have them answered.  pt/guardian denies any suicidal ideation or thoughts, pt states not a threat to self or others     70 yo male PAST MEDICAL & SURGICAL HISTORY: Seizure-last activity within a year, DVT, lower extremity-Left left, Fall ( 6/2/24) presents for PAST in preparation for  TRANSPERINEAL FUSION PROSTATE BIOPSY. Per pt he was referred to Dr Santamaria for elevated PSA otherwise denies any symptoms.   s/p fall yesterday ( 6/2/24) trip on a table in the house was in ED yesterday ( 6/2/24)- the fall was not related to seizure activity , after pt's wife passageway pt was homeless -lived on the streets but 4 years ago he was "found" by his sister and moved in with her.   PATIENT/GUARDIAN CURRENTLY DENIES CHEST PAIN  SHORTNESS OF BREATH  PALPITATIONS,  DYSURIA, OR UPPER RESPIRATORY INFECTION IN PAST 2 WEEKS    Anesthesia Alert  NO--Difficult Airway  NO--History of neck surgery or radiation  NO--Limited ROM of neck  NO--History of Malignant hyperthermia  NO--No personal or family history of Pseudocholinesterase deficiency.  NO--Prior Anesthesia Complication  NO--Latex Allergy  NO--Loose teeth  NO--History of Rheumatoid Arthritis  NO--Bleeding risk  NO--ELVIRA  YES--SEIZURE precautions    [employer] Activity Status Index (DASI) from [employer]  on 6/3/2024  ** All calculations should be rechecked by clinician prior to use **    RESULT SUMMARY:  18.95 points  The higher the score (maximum 58.2), the higher the functional status.    5.07 METs        INPUTS:  Take care of self —> 2.75 = Yes  Walk indoors —> 1.75 = Yes  Walk 1&ndash;2 blocks on level ground —> 2.75 = Yes  Climb a flight of stairs or walk up a hill —> 5.5 = Yes  Run a short distance —> 0 = No  Do light work around the house —> 2.7 = Yes  Do moderate work around the house —> 3.5 = Yes  Do heavy work around the house —> 0 = No  Do yardwork —> 0 = No  Have sexual relations —> 0 = No  Participate in moderate recreational activities —> 0 = No  Participate in strenuous sports —> 0 = No  Revised Cardiac Risk Index for Pre-Operative Risk from [address]  on 6/3/2024  ** All calculations should be rechecked by clinician prior to use **    RESULT SUMMARY:  0 points  Class I Risk    3.9 %  30-day risk of death, MI, or cardiac arrest    From [place] 2017. These numbers are higher than those from the original study (Haris 1999). See Evidence for details.      INPUTS:  Elevated-risk surgery —> 0 = No  History of ischemic heart disease —> 0 = No  History of congestive heart failure —> 0 = No  History of cerebrovascular disease —> 0 = No  Pre-operative treatment with insulin —> 0 = No  Pre-operative creatinine >2 mg/dL / 176.8 µmol/L —> 0 = No    PT/GUARDIAN DENIES ANY RASHES, ABRASION, OR OPEN WOUNDS OR CUTS    AS PER THE PT/GUARDIAN, THIS IS HIS/HER COMPLETE MEDICAL AND SURGICAL HX, INCLUDING MEDICATIONS PRESCRIBED AND OVER THE COUNTER  Patient/guardian understands the instructions and was given the opportunity to ask questions and have them answered.  pt/guardian denies any suicidal ideation or thoughts, pt states not a threat to self or others

## 2024-06-03 NOTE — H&P PST ADULT - NSICDXPASTMEDICALHX_GEN_ALL_CORE_FT
PAST MEDICAL HISTORY:  Cigarette smoker     DVT, lower extremity Left leg    Fall     Seizure      PAST MEDICAL HISTORY:  Cigarette smoker     DVT, lower extremity Left leg    Elevated PSA     Fall     Seizure

## 2024-06-03 NOTE — H&P PST ADULT - IN ACCORDANCE WITH [place] STATE LAW, WE OFFER EVERY PATIENT A HEPATITIS C TEST. WOULD YOU LIKE TO BE TESTED TODAY?
11y M with HA x 5 days, intermittent, L sided, retroorbital, waxing and waning, improved with motrin. Not associated with any time of day, no nausea or vomiting. Some tinnitus, some photophobia only with HA. No neck pain. No fever. Does not wake him from sleep, but he does have pain when he wakes up.  No other neuro symptoms. Normal gait. Saw PMD yesterday who diagnosed with migraine. Today had HA, but mom tried to avoid giving motrin, let him sleep. Woke up with worsening pain, crying. Called PMD and told to come to ED.   Vital Signs Stable  Gen: well appearing, NAD  HEENT: no conjunctivitis, MMM  No sinus tenderness  Neck supple  Cardiac: regular rate rhythm, normal S1S2  Chest: CTA BL, no wheeze or crackles  Abdomen: normal BS, soft, NT  Extremity: no gross deformity, good perfusion  Skin: no rash  Neuro: grossly normal, cn II -XII in tact, normal speech, normal gait, fundoscopic exam wnl    AP 11y M with HA x 4-5 days, migrainous in quality. No neuro symptoms, no neck pain or fever. Do not suspect meningitis or any space occupying lesion. Pain well controlled now, no pain. Will discuss with PMD.
Opt out

## 2024-06-04 DIAGNOSIS — Z01.818 ENCOUNTER FOR OTHER PREPROCEDURAL EXAMINATION: ICD-10-CM

## 2024-06-04 DIAGNOSIS — R97.20 ELEVATED PROSTATE SPECIFIC ANTIGEN [PSA]: ICD-10-CM

## 2024-06-05 LAB
CULTURE RESULTS: NO GROWTH — SIGNIFICANT CHANGE UP
SPECIMEN SOURCE: SIGNIFICANT CHANGE UP

## 2024-06-06 NOTE — ED ADULT TRIAGE NOTE - AS O2 DELIVERY
room air
Pt tolerated procedure well. See ED procedure note. Discussed proper wound care, scar prevention techniques including use of sunscreen, f/u with PCP in 2-3 days for wound check, return precautions given. All questions answered. Return precautions including but not limited to those listed on discharge instructions were discussed at length and caregivers felt comfortable taking patient home. All questions answered prior to discharge. -Yosvany Llamas PA-C

## 2024-06-10 RX ORDER — PHENYTOIN SODIUM 300 MG/1
300 CAPSULE, EXTENDED RELEASE ORAL
Qty: 30 | Refills: 1 | Status: DISCONTINUED | COMMUNITY
Start: 2022-10-31 | End: 2024-06-10

## 2024-06-10 RX ORDER — PHENYTOIN SODIUM 100 MG/1
100 CAPSULE ORAL
Qty: 120 | Refills: 1 | Status: ACTIVE | COMMUNITY
Start: 1900-01-01 | End: 1900-01-01
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
## 2024-06-19 PROBLEM — R97.20 ELEVATED PROSTATE SPECIFIC ANTIGEN [PSA]: Chronic | Status: ACTIVE | Noted: 2024-06-03

## 2024-06-21 PROBLEM — F17.210 NICOTINE DEPENDENCE, CIGARETTES, UNCOMPLICATED: Chronic | Status: ACTIVE | Noted: 2024-06-03

## 2024-06-25 ENCOUNTER — INPATIENT (INPATIENT)
Facility: HOSPITAL | Age: 69
LOS: 1 days | Discharge: HOME | DRG: 101 | End: 2024-06-27
Attending: HOSPITALIST | Admitting: PSYCHIATRY & NEUROLOGY
Payer: MEDICARE

## 2024-06-25 VITALS
SYSTOLIC BLOOD PRESSURE: 115 MMHG | TEMPERATURE: 99 F | DIASTOLIC BLOOD PRESSURE: 52 MMHG | WEIGHT: 173.06 LBS | OXYGEN SATURATION: 98 % | HEIGHT: 73 IN | RESPIRATION RATE: 16 BRPM | HEART RATE: 70 BPM

## 2024-06-25 DIAGNOSIS — G40.909 EPILEPSY, UNSPECIFIED, NOT INTRACTABLE, WITHOUT STATUS EPILEPTICUS: ICD-10-CM

## 2024-06-25 LAB
ALBUMIN SERPL ELPH-MCNC: 3.6 G/DL — SIGNIFICANT CHANGE UP (ref 3.5–5.2)
ALP SERPL-CCNC: 84 U/L — SIGNIFICANT CHANGE UP (ref 30–115)
ALT FLD-CCNC: 30 U/L — SIGNIFICANT CHANGE UP (ref 0–41)
ANION GAP SERPL CALC-SCNC: 6 MMOL/L — LOW (ref 7–14)
AST SERPL-CCNC: 24 U/L — SIGNIFICANT CHANGE UP (ref 0–41)
BASOPHILS # BLD AUTO: 0.04 K/UL — SIGNIFICANT CHANGE UP (ref 0–0.2)
BASOPHILS NFR BLD AUTO: 0.7 % — SIGNIFICANT CHANGE UP (ref 0–1)
BILIRUB SERPL-MCNC: <0.2 MG/DL — SIGNIFICANT CHANGE UP (ref 0.2–1.2)
BUN SERPL-MCNC: 27 MG/DL — HIGH (ref 10–20)
CALCIUM SERPL-MCNC: 8.4 MG/DL — SIGNIFICANT CHANGE UP (ref 8.4–10.5)
CHLORIDE SERPL-SCNC: 106 MMOL/L — SIGNIFICANT CHANGE UP (ref 98–110)
CO2 SERPL-SCNC: 30 MMOL/L — SIGNIFICANT CHANGE UP (ref 17–32)
CREAT SERPL-MCNC: 1.2 MG/DL — SIGNIFICANT CHANGE UP (ref 0.7–1.5)
EGFR: 66 ML/MIN/1.73M2 — SIGNIFICANT CHANGE UP
EOSINOPHIL # BLD AUTO: 0.5 K/UL — SIGNIFICANT CHANGE UP (ref 0–0.7)
EOSINOPHIL NFR BLD AUTO: 8.5 % — HIGH (ref 0–8)
GLUCOSE SERPL-MCNC: 110 MG/DL — HIGH (ref 70–99)
HCT VFR BLD CALC: 34.7 % — LOW (ref 42–52)
HGB BLD-MCNC: 11.7 G/DL — LOW (ref 14–18)
IMM GRANULOCYTES NFR BLD AUTO: 0.3 % — SIGNIFICANT CHANGE UP (ref 0.1–0.3)
LYMPHOCYTES # BLD AUTO: 2.22 K/UL — SIGNIFICANT CHANGE UP (ref 1.2–3.4)
LYMPHOCYTES # BLD AUTO: 37.8 % — SIGNIFICANT CHANGE UP (ref 20.5–51.1)
MAGNESIUM SERPL-MCNC: 2.1 MG/DL — SIGNIFICANT CHANGE UP (ref 1.8–2.4)
MCHC RBC-ENTMCNC: 31.4 PG — HIGH (ref 27–31)
MCHC RBC-ENTMCNC: 33.7 G/DL — SIGNIFICANT CHANGE UP (ref 32–37)
MCV RBC AUTO: 93 FL — SIGNIFICANT CHANGE UP (ref 80–94)
MONOCYTES # BLD AUTO: 0.49 K/UL — SIGNIFICANT CHANGE UP (ref 0.1–0.6)
MONOCYTES NFR BLD AUTO: 8.3 % — SIGNIFICANT CHANGE UP (ref 1.7–9.3)
NEUTROPHILS # BLD AUTO: 2.61 K/UL — SIGNIFICANT CHANGE UP (ref 1.4–6.5)
NEUTROPHILS NFR BLD AUTO: 44.4 % — SIGNIFICANT CHANGE UP (ref 42.2–75.2)
NRBC # BLD: 0 /100 WBCS — SIGNIFICANT CHANGE UP (ref 0–0)
PHENYTOIN FREE SERPL-MCNC: 17.3 UG/ML — SIGNIFICANT CHANGE UP (ref 10–20)
PLATELET # BLD AUTO: 149 K/UL — SIGNIFICANT CHANGE UP (ref 130–400)
PMV BLD: 10.1 FL — SIGNIFICANT CHANGE UP (ref 7.4–10.4)
POTASSIUM SERPL-MCNC: 4.3 MMOL/L — SIGNIFICANT CHANGE UP (ref 3.5–5)
POTASSIUM SERPL-SCNC: 4.3 MMOL/L — SIGNIFICANT CHANGE UP (ref 3.5–5)
PROT SERPL-MCNC: 6.1 G/DL — SIGNIFICANT CHANGE UP (ref 6–8)
RBC # BLD: 3.73 M/UL — LOW (ref 4.7–6.1)
RBC # FLD: 14.4 % — SIGNIFICANT CHANGE UP (ref 11.5–14.5)
SODIUM SERPL-SCNC: 142 MMOL/L — SIGNIFICANT CHANGE UP (ref 135–146)
VALPROATE SERPL-MCNC: 22 UG/ML — LOW (ref 50–100)
WBC # BLD: 5.88 K/UL — SIGNIFICANT CHANGE UP (ref 4.8–10.8)
WBC # FLD AUTO: 5.88 K/UL — SIGNIFICANT CHANGE UP (ref 4.8–10.8)

## 2024-06-25 PROCEDURE — 99221 1ST HOSP IP/OBS SF/LOW 40: CPT

## 2024-06-25 PROCEDURE — 80053 COMPREHEN METABOLIC PANEL: CPT

## 2024-06-25 PROCEDURE — 85025 COMPLETE CBC W/AUTO DIFF WBC: CPT

## 2024-06-25 PROCEDURE — 95700 EEG CONT REC W/VID EEG TECH: CPT

## 2024-06-25 PROCEDURE — 80185 ASSAY OF PHENYTOIN TOTAL: CPT

## 2024-06-25 PROCEDURE — 36415 COLL VENOUS BLD VENIPUNCTURE: CPT

## 2024-06-25 PROCEDURE — 83735 ASSAY OF MAGNESIUM: CPT

## 2024-06-25 PROCEDURE — 80164 ASSAY DIPROPYLACETIC ACD TOT: CPT

## 2024-06-25 PROCEDURE — 95716 VEEG EA 12-26HR CONT MNTR: CPT

## 2024-06-25 RX ORDER — PHENYTOIN SODIUM 100 MG/1
100 CAPSULE, EXTENDED RELEASE ORAL
Refills: 0 | Status: DISCONTINUED | OUTPATIENT
Start: 2024-06-26 | End: 2024-06-26

## 2024-06-25 RX ORDER — ACETAMINOPHEN 325 MG
650 TABLET ORAL EVERY 6 HOURS
Refills: 0 | Status: DISCONTINUED | OUTPATIENT
Start: 2024-06-25 | End: 2024-06-27

## 2024-06-25 RX ORDER — FAMOTIDINE 40 MG
20 TABLET ORAL DAILY
Refills: 0 | Status: DISCONTINUED | OUTPATIENT
Start: 2024-06-25 | End: 2024-06-27

## 2024-06-25 RX ORDER — LORAZEPAM 0.5 MG
2 TABLET ORAL EVERY 8 HOURS
Refills: 0 | Status: DISCONTINUED | OUTPATIENT
Start: 2024-06-25 | End: 2024-06-27

## 2024-06-25 RX ORDER — ATORVASTATIN CALCIUM 20 MG/1
20 TABLET, FILM COATED ORAL AT BEDTIME
Refills: 0 | Status: DISCONTINUED | OUTPATIENT
Start: 2024-06-25 | End: 2024-06-27

## 2024-06-25 RX ORDER — ONDANSETRON HYDROCHLORIDE 2 MG/ML
4 INJECTION INTRAMUSCULAR; INTRAVENOUS EVERY 8 HOURS
Refills: 0 | Status: DISCONTINUED | OUTPATIENT
Start: 2024-06-25 | End: 2024-06-27

## 2024-06-25 RX ORDER — DIVALPROEX SODIUM 500 MG
1000 TABLET, DELAYED RELEASE (ENTERIC COATED) ORAL EVERY 12 HOURS
Refills: 0 | Status: DISCONTINUED | OUTPATIENT
Start: 2024-06-25 | End: 2024-06-26

## 2024-06-25 RX ORDER — MAGNESIUM GLUCONATE 27 MG(500)
250 TABLET ORAL DAILY
Refills: 0 | Status: DISCONTINUED | OUTPATIENT
Start: 2024-06-25 | End: 2024-06-27

## 2024-06-25 RX ORDER — DIVALPROEX SODIUM 500 MG/1
0 TABLET, DELAYED RELEASE ORAL
Qty: 0 | Refills: 0 | DISCHARGE

## 2024-06-25 RX ORDER — MAGNESIUM, ALUMINUM HYDROXIDE 400-400
30 TABLET,CHEWABLE ORAL EVERY 4 HOURS
Refills: 0 | Status: DISCONTINUED | OUTPATIENT
Start: 2024-06-25 | End: 2024-06-27

## 2024-06-25 RX ORDER — APIXABAN 5 MG/1
5 TABLET, FILM COATED ORAL
Refills: 0 | Status: DISCONTINUED | OUTPATIENT
Start: 2024-06-25 | End: 2024-06-27

## 2024-06-25 RX ORDER — PHENYTOIN SODIUM 100 MG/1
300 CAPSULE, EXTENDED RELEASE ORAL AT BEDTIME
Refills: 0 | Status: DISCONTINUED | OUTPATIENT
Start: 2024-06-25 | End: 2024-06-26

## 2024-06-25 RX ORDER — LORAZEPAM 0.5 MG
2 TABLET ORAL THREE TIMES A DAY
Refills: 0 | Status: DISCONTINUED | OUTPATIENT
Start: 2024-06-25 | End: 2024-06-27

## 2024-06-25 RX ADMIN — Medication 1000 MILLIGRAM(S): at 21:59

## 2024-06-25 RX ADMIN — Medication 20 MILLIGRAM(S): at 17:35

## 2024-06-25 RX ADMIN — ATORVASTATIN CALCIUM 20 MILLIGRAM(S): 20 TABLET, FILM COATED ORAL at 21:59

## 2024-06-25 RX ADMIN — PHENYTOIN SODIUM 300 MILLIGRAM(S): 100 CAPSULE, EXTENDED RELEASE ORAL at 21:59

## 2024-06-25 RX ADMIN — Medication 250 MILLIGRAM(S): at 17:36

## 2024-06-25 RX ADMIN — APIXABAN 5 MILLIGRAM(S): 5 TABLET, FILM COATED ORAL at 17:35

## 2024-06-26 LAB
PHENYTOIN FREE SERPL-MCNC: 15.3 UG/ML — SIGNIFICANT CHANGE UP (ref 10–20)
VALPROATE SERPL-MCNC: 33 UG/ML — LOW (ref 50–100)

## 2024-06-26 PROCEDURE — 99231 SBSQ HOSP IP/OBS SF/LOW 25: CPT

## 2024-06-26 PROCEDURE — 95720 EEG PHY/QHP EA INCR W/VEEG: CPT

## 2024-06-26 RX ORDER — PHENYTOIN SODIUM 100 MG/1
200 CAPSULE, EXTENDED RELEASE ORAL AT BEDTIME
Refills: 0 | Status: DISCONTINUED | OUTPATIENT
Start: 2024-06-26 | End: 2024-06-27

## 2024-06-26 RX ORDER — PHENYTOIN SODIUM 100 MG/1
50 CAPSULE, EXTENDED RELEASE ORAL EVERY 12 HOURS
Refills: 0 | Status: DISCONTINUED | OUTPATIENT
Start: 2024-06-26 | End: 2024-06-27

## 2024-06-26 RX ORDER — DIVALPROEX SODIUM 500 MG
1250 TABLET, DELAYED RELEASE (ENTERIC COATED) ORAL AT BEDTIME
Refills: 0 | Status: DISCONTINUED | OUTPATIENT
Start: 2024-06-26 | End: 2024-06-27

## 2024-06-26 RX ORDER — DIVALPROEX SODIUM 500 MG
1000 TABLET, DELAYED RELEASE (ENTERIC COATED) ORAL
Refills: 0 | Status: DISCONTINUED | OUTPATIENT
Start: 2024-06-27 | End: 2024-06-27

## 2024-06-26 RX ADMIN — APIXABAN 5 MILLIGRAM(S): 5 TABLET, FILM COATED ORAL at 17:26

## 2024-06-26 RX ADMIN — Medication 20 MILLIGRAM(S): at 11:41

## 2024-06-26 RX ADMIN — Medication 1000 MILLIGRAM(S): at 09:46

## 2024-06-26 RX ADMIN — Medication 1250 MILLIGRAM(S): at 21:42

## 2024-06-26 RX ADMIN — ATORVASTATIN CALCIUM 20 MILLIGRAM(S): 20 TABLET, FILM COATED ORAL at 21:40

## 2024-06-26 RX ADMIN — PHENYTOIN SODIUM 50 MILLIGRAM(S): 100 CAPSULE, EXTENDED RELEASE ORAL at 21:39

## 2024-06-26 RX ADMIN — APIXABAN 5 MILLIGRAM(S): 5 TABLET, FILM COATED ORAL at 06:26

## 2024-06-26 RX ADMIN — PHENYTOIN SODIUM 200 MILLIGRAM(S): 100 CAPSULE, EXTENDED RELEASE ORAL at 21:39

## 2024-06-26 RX ADMIN — Medication 250 MILLIGRAM(S): at 11:39

## 2024-06-26 RX ADMIN — PHENYTOIN SODIUM 100 MILLIGRAM(S): 100 CAPSULE, EXTENDED RELEASE ORAL at 09:45

## 2024-06-27 ENCOUNTER — TRANSCRIPTION ENCOUNTER (OUTPATIENT)
Age: 69
End: 2024-06-27

## 2024-06-27 VITALS
TEMPERATURE: 98 F | DIASTOLIC BLOOD PRESSURE: 56 MMHG | SYSTOLIC BLOOD PRESSURE: 97 MMHG | RESPIRATION RATE: 16 BRPM | HEART RATE: 63 BPM

## 2024-06-27 PROCEDURE — 99231 SBSQ HOSP IP/OBS SF/LOW 25: CPT

## 2024-06-27 PROCEDURE — 95720 EEG PHY/QHP EA INCR W/VEEG: CPT

## 2024-06-27 PROCEDURE — 99239 HOSP IP/OBS DSCHRG MGMT >30: CPT

## 2024-06-27 RX ORDER — PHENYTOIN SODIUM 100 MG/1
1 CAPSULE, EXTENDED RELEASE ORAL
Qty: 30 | Refills: 0
Start: 2024-06-27 | End: 2024-07-26

## 2024-06-27 RX ORDER — APIXABAN 5 MG/1
1 TABLET, FILM COATED ORAL
Qty: 0 | Refills: 0 | DISCHARGE
Start: 2024-06-27

## 2024-06-27 RX ORDER — PHENYTOIN SODIUM 100 MG/1
2 CAPSULE, EXTENDED RELEASE ORAL
Qty: 60 | Refills: 3
Start: 2024-06-27 | End: 2024-10-24

## 2024-06-27 RX ORDER — DIVALPROEX SODIUM 500 MG
1250 TABLET, DELAYED RELEASE (ENTERIC COATED) ORAL EVERY 12 HOURS
Refills: 0 | Status: DISCONTINUED | OUTPATIENT
Start: 2024-06-27 | End: 2024-06-27

## 2024-06-27 RX ORDER — DIVALPROEX SODIUM 500 MG
5 TABLET, DELAYED RELEASE (ENTERIC COATED) ORAL
Qty: 300 | Refills: 3
Start: 2024-06-27 | End: 2024-10-24

## 2024-06-27 RX ADMIN — Medication 20 MILLIGRAM(S): at 11:50

## 2024-06-27 RX ADMIN — Medication 1250 MILLIGRAM(S): at 10:20

## 2024-06-27 RX ADMIN — Medication 250 MILLIGRAM(S): at 11:50

## 2024-06-27 RX ADMIN — APIXABAN 5 MILLIGRAM(S): 5 TABLET, FILM COATED ORAL at 05:22

## 2024-06-27 RX ADMIN — PHENYTOIN SODIUM 50 MILLIGRAM(S): 100 CAPSULE, EXTENDED RELEASE ORAL at 10:20

## 2024-07-01 ENCOUNTER — OUTPATIENT (OUTPATIENT)
Dept: OUTPATIENT SERVICES | Facility: HOSPITAL | Age: 69
LOS: 1 days | End: 2024-07-01
Payer: MEDICARE

## 2024-07-01 VITALS
DIASTOLIC BLOOD PRESSURE: 70 MMHG | WEIGHT: 186.95 LBS | HEART RATE: 69 BPM | HEIGHT: 73 IN | RESPIRATION RATE: 18 BRPM | SYSTOLIC BLOOD PRESSURE: 104 MMHG | OXYGEN SATURATION: 100 % | TEMPERATURE: 98 F

## 2024-07-01 DIAGNOSIS — R97.20 ELEVATED PROSTATE SPECIFIC ANTIGEN [PSA]: ICD-10-CM

## 2024-07-01 DIAGNOSIS — Z01.818 ENCOUNTER FOR OTHER PREPROCEDURAL EXAMINATION: ICD-10-CM

## 2024-07-01 LAB
APPEARANCE UR: CLEAR — SIGNIFICANT CHANGE UP
APTT BLD: 33.7 SEC — SIGNIFICANT CHANGE UP (ref 27–39.2)
BACTERIA # UR AUTO: NEGATIVE /HPF — SIGNIFICANT CHANGE UP
BILIRUB UR-MCNC: NEGATIVE — SIGNIFICANT CHANGE UP
CAST: 2 /LPF — SIGNIFICANT CHANGE UP (ref 0–4)
COLOR SPEC: YELLOW — SIGNIFICANT CHANGE UP
DIFF PNL FLD: NEGATIVE — SIGNIFICANT CHANGE UP
GLUCOSE UR QL: NEGATIVE MG/DL — SIGNIFICANT CHANGE UP
INR BLD: 0.9 RATIO — SIGNIFICANT CHANGE UP (ref 0.65–1.3)
KETONES UR-MCNC: NEGATIVE MG/DL — SIGNIFICANT CHANGE UP
LEUKOCYTE ESTERASE UR-ACNC: NEGATIVE — SIGNIFICANT CHANGE UP
NITRITE UR-MCNC: NEGATIVE — SIGNIFICANT CHANGE UP
PH UR: 6.5 — SIGNIFICANT CHANGE UP (ref 5–8)
PROT UR-MCNC: 30 MG/DL
PROTHROM AB SERPL-ACNC: 10.3 SEC — SIGNIFICANT CHANGE UP (ref 9.95–12.87)
RBC CASTS # UR COMP ASSIST: 2 /HPF — SIGNIFICANT CHANGE UP (ref 0–4)
SP GR SPEC: 1.01 — SIGNIFICANT CHANGE UP (ref 1–1.03)
SQUAMOUS # UR AUTO: 2 /HPF — SIGNIFICANT CHANGE UP (ref 0–5)
UROBILINOGEN FLD QL: 0.2 MG/DL — SIGNIFICANT CHANGE UP (ref 0.2–1)
WBC UR QL: 1 /HPF — SIGNIFICANT CHANGE UP (ref 0–5)

## 2024-07-01 PROCEDURE — 99214 OFFICE O/P EST MOD 30 MIN: CPT | Mod: 25

## 2024-07-01 PROCEDURE — 36415 COLL VENOUS BLD VENIPUNCTURE: CPT

## 2024-07-01 PROCEDURE — 87086 URINE CULTURE/COLONY COUNT: CPT

## 2024-07-01 PROCEDURE — 85730 THROMBOPLASTIN TIME PARTIAL: CPT

## 2024-07-01 PROCEDURE — 85610 PROTHROMBIN TIME: CPT

## 2024-07-01 PROCEDURE — 81001 URINALYSIS AUTO W/SCOPE: CPT

## 2024-07-01 RX ORDER — METHYLPREDNISOLONE 4 MG
1 TABLET ORAL
Refills: 0 | DISCHARGE

## 2024-07-01 RX ORDER — ATORVASTATIN CALCIUM 80 MG/1
1 TABLET, FILM COATED ORAL
Qty: 0 | Refills: 0 | DISCHARGE

## 2024-07-01 RX ORDER — FAMOTIDINE 10 MG/ML
1 INJECTION INTRAVENOUS
Refills: 0 | DISCHARGE

## 2024-07-02 DIAGNOSIS — G40.909 EPILEPSY, UNSPECIFIED, NOT INTRACTABLE, WITHOUT STATUS EPILEPTICUS: ICD-10-CM

## 2024-07-02 DIAGNOSIS — R97.20 ELEVATED PROSTATE SPECIFIC ANTIGEN [PSA]: ICD-10-CM

## 2024-07-02 DIAGNOSIS — Z79.899 OTHER LONG TERM (CURRENT) DRUG THERAPY: ICD-10-CM

## 2024-07-02 DIAGNOSIS — E78.5 HYPERLIPIDEMIA, UNSPECIFIED: ICD-10-CM

## 2024-07-02 DIAGNOSIS — Z79.01 LONG TERM (CURRENT) USE OF ANTICOAGULANTS: ICD-10-CM

## 2024-07-02 DIAGNOSIS — K21.9 GASTRO-ESOPHAGEAL REFLUX DISEASE WITHOUT ESOPHAGITIS: ICD-10-CM

## 2024-07-02 DIAGNOSIS — Z91.81 HISTORY OF FALLING: ICD-10-CM

## 2024-07-02 DIAGNOSIS — F79 UNSPECIFIED INTELLECTUAL DISABILITIES: ICD-10-CM

## 2024-07-02 DIAGNOSIS — I82.5Z2 CHRONIC EMBOLISM AND THROMBOSIS OF UNSPECIFIED DEEP VEINS OF LEFT DISTAL LOWER EXTREMITY: ICD-10-CM

## 2024-07-02 DIAGNOSIS — Z01.818 ENCOUNTER FOR OTHER PREPROCEDURAL EXAMINATION: ICD-10-CM

## 2024-07-03 LAB
CULTURE RESULTS: SIGNIFICANT CHANGE UP
SPECIMEN SOURCE: SIGNIFICANT CHANGE UP

## 2024-07-08 ENCOUNTER — APPOINTMENT (OUTPATIENT)
Dept: UROLOGY | Facility: HOSPITAL | Age: 69
End: 2024-07-08

## 2024-07-08 ENCOUNTER — TRANSCRIPTION ENCOUNTER (OUTPATIENT)
Age: 69
End: 2024-07-08

## 2024-07-08 ENCOUNTER — OUTPATIENT (OUTPATIENT)
Dept: OUTPATIENT SERVICES | Facility: HOSPITAL | Age: 69
LOS: 1 days | Discharge: HOME | End: 2024-07-08
Payer: MEDICARE

## 2024-07-08 ENCOUNTER — RESULT REVIEW (OUTPATIENT)
Age: 69
End: 2024-07-08

## 2024-07-08 VITALS — RESPIRATION RATE: 17 BRPM | HEART RATE: 66 BPM | DIASTOLIC BLOOD PRESSURE: 61 MMHG | SYSTOLIC BLOOD PRESSURE: 104 MMHG

## 2024-07-08 VITALS
SYSTOLIC BLOOD PRESSURE: 100 MMHG | HEIGHT: 73 IN | RESPIRATION RATE: 18 BRPM | OXYGEN SATURATION: 98 % | DIASTOLIC BLOOD PRESSURE: 60 MMHG | TEMPERATURE: 98 F | WEIGHT: 160.94 LBS | HEART RATE: 79 BPM

## 2024-07-08 DIAGNOSIS — R97.20 ELEVATED PROSTATE SPECIFIC ANTIGEN [PSA]: ICD-10-CM

## 2024-07-08 PROCEDURE — 76999F: CUSTOM | Mod: 26

## 2024-07-08 PROCEDURE — G0416: CPT | Mod: 26

## 2024-07-08 PROCEDURE — G0416: CPT

## 2024-07-08 PROCEDURE — 93975 VASCULAR STUDY: CPT | Mod: 26

## 2024-07-08 PROCEDURE — 55700: CPT

## 2024-07-08 RX ORDER — DEXTROSE MONOHYDRATE AND SODIUM CHLORIDE 5; .3 G/100ML; G/100ML
1000 INJECTION, SOLUTION INTRAVENOUS
Refills: 0 | Status: DISCONTINUED | OUTPATIENT
Start: 2024-07-08 | End: 2024-07-08

## 2024-07-08 RX ORDER — HYDROMORPHONE HCL 0.2 MG/ML
0.5 INJECTION, SOLUTION INTRAVENOUS
Refills: 0 | Status: DISCONTINUED | OUTPATIENT
Start: 2024-07-08 | End: 2024-07-08

## 2024-07-08 RX ORDER — ONDANSETRON HYDROCHLORIDE 2 MG/ML
4 INJECTION INTRAMUSCULAR; INTRAVENOUS ONCE
Refills: 0 | Status: DISCONTINUED | OUTPATIENT
Start: 2024-07-08 | End: 2024-07-08

## 2024-07-08 RX ORDER — KETOROLAC TROMETHAMINE 30 MG/ML
30 INJECTION, SOLUTION INTRAMUSCULAR ONCE
Refills: 0 | Status: DISCONTINUED | OUTPATIENT
Start: 2024-07-08 | End: 2024-07-08

## 2024-07-08 RX ORDER — OXYCODONE AND ACETAMINOPHEN 5; 325 MG/1; MG/1
1 TABLET ORAL ONCE
Refills: 0 | Status: DISCONTINUED | OUTPATIENT
Start: 2024-07-08 | End: 2024-07-08

## 2024-07-08 RX ADMIN — DEXTROSE MONOHYDRATE AND SODIUM CHLORIDE 75 MILLILITER(S): 5; .3 INJECTION, SOLUTION INTRAVENOUS at 15:11

## 2024-07-09 ENCOUNTER — NON-APPOINTMENT (OUTPATIENT)
Age: 69
End: 2024-07-09

## 2024-07-12 DIAGNOSIS — Z86.718 PERSONAL HISTORY OF OTHER VENOUS THROMBOSIS AND EMBOLISM: ICD-10-CM

## 2024-07-12 DIAGNOSIS — R97.20 ELEVATED PROSTATE SPECIFIC ANTIGEN [PSA]: ICD-10-CM

## 2024-07-12 DIAGNOSIS — F17.210 NICOTINE DEPENDENCE, CIGARETTES, UNCOMPLICATED: ICD-10-CM

## 2024-07-12 DIAGNOSIS — C61 MALIGNANT NEOPLASM OF PROSTATE: ICD-10-CM

## 2024-07-12 LAB — SURGICAL PATHOLOGY STUDY: SIGNIFICANT CHANGE UP

## 2024-07-19 ENCOUNTER — APPOINTMENT (OUTPATIENT)
Dept: UROLOGY | Facility: CLINIC | Age: 69
End: 2024-07-19

## 2024-07-20 PROBLEM — Z86.69 PERSONAL HISTORY OF OTHER DISEASES OF THE NERVOUS SYSTEM AND SENSE ORGANS: Chronic | Status: ACTIVE | Noted: 2024-07-08

## 2024-07-23 ENCOUNTER — RX RENEWAL (OUTPATIENT)
Age: 69
End: 2024-07-23

## 2024-08-15 NOTE — ED ADULT NURSE NOTE - ISOLATION TYPE:
Is the patient on isolation?: No   Do you expect the patient to require telemetry (informational-only for bed management): Yes   Do you expect the patient to require observation or inpt? (informational-only for bed management): Observation  
None

## 2024-08-21 ENCOUNTER — APPOINTMENT (OUTPATIENT)
Dept: NEUROLOGY | Facility: CLINIC | Age: 69
End: 2024-08-21
Payer: MEDICARE

## 2024-08-21 VITALS
HEIGHT: 73 IN | BODY MASS INDEX: 22.3 KG/M2 | HEART RATE: 88 BPM | OXYGEN SATURATION: 99 % | RESPIRATION RATE: 18 BRPM | WEIGHT: 168.25 LBS | SYSTOLIC BLOOD PRESSURE: 125 MMHG | DIASTOLIC BLOOD PRESSURE: 88 MMHG

## 2024-08-21 DIAGNOSIS — G40.909 EPILEPSY, UNSPECIFIED, NOT INTRACTABLE, W/OUT STATUS EPILEPTICUS: ICD-10-CM

## 2024-08-21 PROCEDURE — 99213 OFFICE O/P EST LOW 20 MIN: CPT

## 2024-08-21 PROCEDURE — G2211 COMPLEX E/M VISIT ADD ON: CPT

## 2024-08-21 RX ORDER — APIXABAN 2.5 MG/1
2.5 TABLET, FILM COATED ORAL
Refills: 0 | Status: ACTIVE | COMMUNITY

## 2024-08-23 RX ORDER — CITALOPRAM HYDROBROMIDE 10 MG/1
10 TABLET, FILM COATED ORAL
Qty: 30 | Refills: 2 | Status: ACTIVE | COMMUNITY
Start: 2024-08-23 | End: 1900-01-01

## 2024-08-23 NOTE — PHYSICAL EXAM
[FreeTextEntry1] : A/A/Ox3 quiet but mor spontaneous and makes better eye contact gradually warms up Good attention slight concrete thinking normal CN no tremor no drift no dysmetria stable gait.

## 2024-08-23 NOTE — HISTORY OF PRESENT ILLNESS
[FreeTextEntry1] : Mike is here with her sister for F/U.  He was admitted to the EMU at the end of June He stayed with us for two days. No seizures. The EEG showed mild left FT slowing and sharp waves. He was discharged on a lower dose of Dilantin to be decreased to 200 hs that he is taking now. He was also asked to take higher dose of Depakote 1250 ( 5 capsules ) bid He was given a prescription for blood test that he did not do .  He says he did not have any seizures and his sister also does not report any His mood is reportedly slightly better but still on the quite/ depressed side He says he  goes out more for walking He says his appetite is good and so is his sleep No falls Has his f/u with the urology and PMD

## 2024-08-23 NOTE — ASSESSMENT
[FreeTextEntry1] : - seizure D/O not well characterized. ( EEG right side focus) 2-R/O mood D/O 3- DLD 4- DVT    plan  start citalopram 5 mg to gradually titrate up level F/U

## 2024-09-27 ENCOUNTER — APPOINTMENT (OUTPATIENT)
Dept: UROLOGY | Facility: CLINIC | Age: 69
End: 2024-09-27
Payer: MEDICARE

## 2024-09-27 VITALS
TEMPERATURE: 98 F | SYSTOLIC BLOOD PRESSURE: 92 MMHG | WEIGHT: 159 LBS | HEIGHT: 73 IN | BODY MASS INDEX: 21.07 KG/M2 | DIASTOLIC BLOOD PRESSURE: 54 MMHG

## 2024-09-27 DIAGNOSIS — C61 MALIGNANT NEOPLASM OF PROSTATE: ICD-10-CM

## 2024-09-27 DIAGNOSIS — I82.409 ACUTE EMBOLISM AND THROMBOSIS OF UNSPECIFIED DEEP VEINS OF UNSPECIFIED LOWER EXTREMITY: ICD-10-CM

## 2024-09-27 DIAGNOSIS — R97.20 ELEVATED PROSTATE, SPECIFIC ANTIGEN [PSA]: ICD-10-CM

## 2024-09-27 PROCEDURE — 81003 URINALYSIS AUTO W/O SCOPE: CPT | Mod: QW

## 2024-09-27 PROCEDURE — 99215 OFFICE O/P EST HI 40 MIN: CPT

## 2024-09-29 LAB
BILIRUB UR QL STRIP: NORMAL
COLLECTION METHOD: NORMAL
GLUCOSE UR-MCNC: NORMAL
HCG UR QL: 0.2 EU/DL
HGB UR QL STRIP.AUTO: NORMAL
KETONES UR-MCNC: NORMAL
LEUKOCYTE ESTERASE UR QL STRIP: NORMAL
NITRITE UR QL STRIP: NORMAL
PH UR STRIP: 6
PROT UR STRIP-MCNC: NORMAL
SP GR UR STRIP: 1.02

## 2024-09-29 NOTE — ASSESSMENT
[FreeTextEntry1] :  69 year old male patient with Adore 3,3 in 3 biopsies, each only 5% of core.  We discussed options including HIFU; radical prostatectomy with laparoscopic robotic assistance, radiation therapy including brachytherapy, and CyberKnife among other forms. We touched on cryoablation; he understands that currently, we do not have this option available but are looking into it as possibilities. The pt understands the risks, benefits, and expected outcomes associated with each of these procedures. The best option in his circumstance was discussed with sister and patient to go on active surveillance. We will have him obtain a PSA in 3 and 6 months and see me in 6 months. If PSA is stable, we would consider repeat MRI and biopsies in 1 year.  He understands that he can take his time and get as much information as he wants prior to proceeding with anything. He can then follow up with me to ask any further questions and/or to make further decisions. A total of 45 minutes was spent with the pt reviewing his issues, discussing options, answering his many questions, and coordinating care.   Next Visit: PSA review (3 month & 6 month)   The submitted E/M billing level for this visit reflects the total time spent on the day of the visit including face-to-face time spent with the patient, non-face-to-face review of medical records and relevant information, documentation, and asynchronous communication with the patient after a visit via phone, email, or patients EHR portal after the visit. The medical records reviewed are either scanned into the chart or reviewed with the patient using a patients electronic medical records portal for patients with records not available to [facility] via electronic transmission platforms from other institutions and labs. Time spent counseling and performing coordination of care was also included in determining the appropriate EM billing level.   I have reviewed and verified information regarding the chief complaint and history recorded by the ancillary staff and/or the patient. I have independently reviewed and interpreted tests performed by other physicians and facilities as necessary.   I have discussed with the patient differential diagnosis, reason for auxiliary tests if ordered, risks, benefits, alternatives, and complications of each form of therapy were discussed.  I personally performed the services described in the documentation, reviewed the documentation recorded by the scribe in my presence, and it accurately and completely records my words and actions.

## 2024-09-29 NOTE — ADDENDUM
[FreeTextEntry1] :  ROSALVA NASH, am scribing for and in the presence of  in the following sections: HISTORY OF PRESENT ILLNESS, PAST MEDICAL/FAMILY/SOCIAL HISTORY, REVIEW OF SYSTEMS, VITAL SIGNS, PHYSICAL EXAM, ASSESSMENT/PLAN on 09/27/2024.

## 2024-09-29 NOTE — HISTORY OF PRESENT ILLNESS
[FreeTextEntry1] :  69 year old male patient seen in follow-up for pathology results. He had an elevated PSA of 7.06 ng/mL and MRI showing 41 cc prostate with prostate biopsy. We went over the results, which show Grade group 1 Adore 3,3 in 3/19 biopsies. All of the cores only had 5% tumor. We discussed this is a fairly minimal amount of low grade tumor and at present, options would include active surveillance as likely the best options. We went over the other options. Denies voiding issues or hematuria.   Pathology Results  - Prostate, right lateral apex, biopsy: Adenocarcinoma of the prostate, Prognostic Grade Group 1 (Bennett score 3+3=6) involving less than 5% (less than 0.5 mm in length) of 1 of 1 core.) - Prostate, left anterior, biopsy: -Adenocarcinoma of the prostate, Prognostic Grade Group 1 (Bennett score 3+3=6) involving less than 5% (less than 0.5 mm in length) of 1 of 1 core. - Prostate, region of interest right anterior, biopsy: Adenocarcinoma of the prostate, Prognostic Grade Group 1 (Bennett score 3+3=6) involving 5% (1 mm in length) of 1 of 2 cores.

## 2024-09-29 NOTE — HISTORY OF PRESENT ILLNESS
[FreeTextEntry1] :  69 year old male patient seen in follow-up for pathology results. He had an elevated PSA of 7.06 ng/mL and MRI showing 41 cc prostate with prostate biopsy. We went over the results, which show Grade group 1 Adore 3,3 in 3/19 biopsies. All of the cores only had 5% tumor. We discussed this is a fairly minimal amount of low grade tumor and at present, options would include active surveillance as likely the best options. We went over the other options. Denies voiding issues or hematuria.   Pathology Results  - Prostate, right lateral apex, biopsy: Adenocarcinoma of the prostate, Prognostic Grade Group 1 (Northwood score 3+3=6) involving less than 5% (less than 0.5 mm in length) of 1 of 1 core.) - Prostate, left anterior, biopsy: -Adenocarcinoma of the prostate, Prognostic Grade Group 1 (Northwood score 3+3=6) involving less than 5% (less than 0.5 mm in length) of 1 of 1 core. - Prostate, region of interest right anterior, biopsy: Adenocarcinoma of the prostate, Prognostic Grade Group 1 (Northwood score 3+3=6) involving 5% (1 mm in length) of 1 of 2 cores.

## 2024-09-29 NOTE — ASSESSMENT
[FreeTextEntry1] :  69 year old male patient with Adore 3,3 in 3 biopsies, each only 5% of core.  We discussed options including HIFU; radical prostatectomy with laparoscopic robotic assistance, radiation therapy including brachytherapy, and CyberKnife among other forms. We touched on cryoablation; he understands that currently, we do not have this option available but are looking into it as possibilities. The pt understands the risks, benefits, and expected outcomes associated with each of these procedures. The best option in his circumstance was discussed with sister and patient to go on active surveillance. We will have him obtain a PSA in 3 and 6 months and see me in 6 months. If PSA is stable, we would consider repeat MRI and biopsies in 1 year.  He understands that he can take his time and get as much information as he wants prior to proceeding with anything. He can then follow up with me to ask any further questions and/or to make further decisions. A total of 45 minutes was spent with the pt reviewing his issues, discussing options, answering his many questions, and coordinating care.   Next Visit: PSA review (3 month & 6 month)   The submitted E/M billing level for this visit reflects the total time spent on the day of the visit including face-to-face time spent with the patient, non-face-to-face review of medical records and relevant information, documentation, and asynchronous communication with the patient after a visit via phone, email, or patients EHR portal after the visit. The medical records reviewed are either scanned into the chart or reviewed with the patient using a patients electronic medical records portal for patients with records not available to [facility] via electronic transmission platforms from other institutions and labs. Time spent counseling and performing coordination of care was also included in determining the appropriate EM billing level.   I have reviewed and verified information regarding the chief complaint and history recorded by the ancillary staff and/or the patient. I have independently reviewed and interpreted tests performed by other physicians and facilities as necessary.   I have discussed with the patient differential diagnosis, reason for auxiliary tests if ordered, risks, benefits, alternatives, and complications of each form of therapy were discussed.  I personally performed the services described in the documentation, reviewed the documentation recorded by the scribe in my presence, and it accurately and completely records my words and actions.

## 2024-10-11 NOTE — PATIENT PROFILE ADULT - NSPRESCRALCFREQ_GEN_A_NUR
Inform patient urine culture results shows intermediate sensitivity to augmentin and resistance to cipro. Will change abx to macrobid which appears to be more sensitive to bacteria in urine. Stop augmentin   Never

## 2025-01-13 ENCOUNTER — APPOINTMENT (OUTPATIENT)
Dept: NEUROLOGY | Facility: CLINIC | Age: 70
End: 2025-01-13
Payer: MEDICARE

## 2025-01-13 VITALS
RESPIRATION RATE: 20 BRPM | HEIGHT: 73 IN | WEIGHT: 161.19 LBS | BODY MASS INDEX: 21.36 KG/M2 | SYSTOLIC BLOOD PRESSURE: 135 MMHG | OXYGEN SATURATION: 98 % | DIASTOLIC BLOOD PRESSURE: 89 MMHG | HEART RATE: 92 BPM

## 2025-01-13 DIAGNOSIS — G40.909 EPILEPSY, UNSPECIFIED, NOT INTRACTABLE, W/OUT STATUS EPILEPTICUS: ICD-10-CM

## 2025-01-13 PROCEDURE — 99213 OFFICE O/P EST LOW 20 MIN: CPT

## 2025-03-07 NOTE — PATIENT PROFILE ADULT - FALL HARM RISK - DEVICES
No  Home programming is consistent:             [] Yes  [] No  Other:   Response to treatment:  good  Instructions for patient:   [x] Verbal [x] Written  Instructions addressed:  manual lymphatic drainage massage        Time In: 1100            Time Out: 1200                      Timed Code Treatment Minutes: 60 minutes      CODE  Minutes  Units   90867 OT Eval Low     43734 OT Eval Medium     41407 OT Eval High     29449 Fluidotherapy     76686 Manual 45 3   01403 Therapeutic Ex     30465 Therapeutic Activity     68880 ADL/COMP Tech Train 15 1   99114 Neuromuscular Re-Ed     18370 OrthoManagementTraining     75304 Paraffin     23189 Electrical Stim - Attended     25337 Iontophoresis     46330 Ultrasound      Other     Total  55 4       Plan: Continue to address:  [x]Bandaging  [x] Self Bandaging/Family Assist  [x]MLD  [x]Self Massage  [x]Exercise  [x]Education  []Obtain referral for garments  []Medical Hold  []Discharge to Home Program  EMILIE Bernstein/FAVIO, JERZY    
None

## 2025-04-07 ENCOUNTER — APPOINTMENT (OUTPATIENT)
Dept: UROLOGY | Facility: CLINIC | Age: 70
End: 2025-04-07
Payer: MEDICARE

## 2025-04-07 DIAGNOSIS — C61 MALIGNANT NEOPLASM OF PROSTATE: ICD-10-CM

## 2025-04-07 PROCEDURE — G2211 COMPLEX E/M VISIT ADD ON: CPT

## 2025-04-07 PROCEDURE — 99214 OFFICE O/P EST MOD 30 MIN: CPT

## 2025-04-10 LAB — PSA SERPL-MCNC: 4.69 NG/ML

## 2025-05-14 ENCOUNTER — RX RENEWAL (OUTPATIENT)
Age: 70
End: 2025-05-14

## 2025-05-22 ENCOUNTER — APPOINTMENT (OUTPATIENT)
Dept: NEUROLOGY | Facility: CLINIC | Age: 70
End: 2025-05-22

## 2025-07-10 NOTE — H&P PST ADULT - NSANTHOBSERVEDRD_ENT_A_CORE
MD Steele came to bedside to discuss plan of care with pt. Pt verbalized understanding of d/c information and medication changes. Pt and pt's sister given discharge paperwork and education reiterated. All questions and concerns addressed. Pt able to drink, eat, & walk without issues. Pt's right groin puncture site has gauze/tegaderm intact and site is free from s/s of bleeding. Iv and tele removed. Transporter pushed pt out at time of d/c to meet sister out front.    No

## 2025-07-21 ENCOUNTER — RX RENEWAL (OUTPATIENT)
Age: 70
End: 2025-07-21

## 2025-07-21 NOTE — ED PROVIDER NOTE - NS ED MD DISPO DISCHARGE
Chronic, at goal (stable), continue current treatment plan  Stable on med, refilled today  Orders:    sertraline (ZOLOFT) 100 MG tablet; Take 2 tablets by mouth daily     Home

## 2025-08-11 ENCOUNTER — APPOINTMENT (OUTPATIENT)
Dept: UROLOGY | Facility: CLINIC | Age: 70
End: 2025-08-11

## 2025-08-11 DIAGNOSIS — G40.909 EPILEPSY, UNSPECIFIED, NOT INTRACTABLE, W/OUT STATUS EPILEPTICUS: ICD-10-CM

## 2025-08-11 DIAGNOSIS — C61 MALIGNANT NEOPLASM OF PROSTATE: ICD-10-CM

## 2025-08-11 DIAGNOSIS — I82.409 ACUTE EMBOLISM AND THROMBOSIS OF UNSPECIFIED DEEP VEINS OF UNSPECIFIED LOWER EXTREMITY: ICD-10-CM

## 2025-08-11 PROCEDURE — 99214 OFFICE O/P EST MOD 30 MIN: CPT

## 2025-08-11 PROCEDURE — G2211 COMPLEX E/M VISIT ADD ON: CPT

## 2025-08-14 ENCOUNTER — RX RENEWAL (OUTPATIENT)
Age: 70
End: 2025-08-14

## 2025-08-19 LAB — PSA SERPL-MCNC: 4.37 NG/ML
